# Patient Record
Sex: FEMALE | Race: WHITE | NOT HISPANIC OR LATINO | Employment: OTHER | URBAN - METROPOLITAN AREA
[De-identification: names, ages, dates, MRNs, and addresses within clinical notes are randomized per-mention and may not be internally consistent; named-entity substitution may affect disease eponyms.]

---

## 2017-06-09 ENCOUNTER — HOSPITAL ENCOUNTER (OUTPATIENT)
Dept: PHYSICAL THERAPY | Facility: REHABILITATION | Age: 72
End: 2017-06-09
Attending: FAMILY MEDICINE
Payer: MEDICARE

## 2017-06-09 PROCEDURE — G8979 MOBILITY GOAL STATUS: HCPCS | Mod: CI

## 2017-06-09 PROCEDURE — G8978 MOBILITY CURRENT STATUS: HCPCS | Mod: CJ

## 2017-06-09 PROCEDURE — 97163 PT EVAL HIGH COMPLEX 45 MIN: CPT

## 2017-06-09 PROCEDURE — 97112 NEUROMUSCULAR REEDUCATION: CPT

## 2017-06-16 ENCOUNTER — HOSPITAL ENCOUNTER (OUTPATIENT)
Dept: PHYSICAL THERAPY | Facility: REHABILITATION | Age: 72
End: 2017-06-16
Attending: FAMILY MEDICINE
Payer: MEDICARE

## 2017-06-16 PROCEDURE — 97014 ELECTRIC STIMULATION THERAPY: CPT

## 2017-06-16 PROCEDURE — 97140 MANUAL THERAPY 1/> REGIONS: CPT

## 2017-06-16 PROCEDURE — 97110 THERAPEUTIC EXERCISES: CPT

## 2020-06-08 ENCOUNTER — PHYSICAL THERAPY (OUTPATIENT)
Dept: PHYSICAL THERAPY | Facility: REHABILITATION | Age: 75
End: 2020-06-08
Attending: FAMILY MEDICINE
Payer: MEDICARE

## 2020-06-08 DIAGNOSIS — M54.41 CHRONIC BILATERAL LOW BACK PAIN WITH BILATERAL SCIATICA: ICD-10-CM

## 2020-06-08 DIAGNOSIS — G89.29 CHRONIC BILATERAL LOW BACK PAIN WITH BILATERAL SCIATICA: ICD-10-CM

## 2020-06-08 DIAGNOSIS — M54.42 CHRONIC BILATERAL LOW BACK PAIN WITH BILATERAL SCIATICA: ICD-10-CM

## 2020-06-08 PROCEDURE — 97014 ELECTRIC STIMULATION THERAPY: CPT

## 2020-06-08 PROCEDURE — 97162 PT EVAL MOD COMPLEX 30 MIN: CPT

## 2020-06-08 ASSESSMENT — ENCOUNTER SYMPTOMS
AWAKENINGS PER NIGHT: 3
QUALITY: ACHING
QUALITY: NUMBNESS
PAIN SCALE: 2
PAIN SCALE AT HIGHEST: 8

## 2020-06-08 NOTE — Clinical Note
June 8, 2020    Venancio Herman M.D.  [address]  [address]  [place] [address]    Patient: Giselle Ramon   YOB: 1945   Date of Visit: 6/8/2020       Dear Venancio Herman M.d.  [address]  [address]  [place] [id number] [phone]    The attached plan of care is being sent to you because your patient’s medical reimbursement requires that you certify the plan of care. Your signature is required to allow uninterrupted insurance coverage.      You may indicate your approval by signing below and faxing this form back to us at No information on file..    Please call Dept: [phone] with any questions or concerns.    Thank you for this referral,        Stephanie Rodriguez, PT, MPT, OCS  [facility] PHYSICAL [address] [address] [phone]    Payer: Payor: MEDICARE / Plan: MEDICARE PART A & B / Product Type: *No Product type* /                                                                  Specialty Plan of Care 06/08/20   Effective from: 6/8/2020  Effective to: 6/8/2020    Plan ID: [id number]           Participants     Name Type Comments Contact Info    Venancio Herman M.D. Provider  [phone]    Stephanie Rodriguez, PT, MPT, OCS PT        Evaluation/Progress Summary     Author: Stephanie Rodriguez PT, MPT, OCS Status: Sign when Signing Visit Last edited: 6/8/2020 12:30 PM         Outpatient Physical Therapy  INITIAL EVALUATION    [facility] Physical [address], [address]  [facility] [address]  Phone:  [phone]    Date of Evaluation: 06/08/2020    Patient: Giselle Ramon  YOB: 1945  MRN: [id number]     Referring Provider: Venancio Herman M.D.  [address]  [facility] [address]  [place] [address]   Referring Diagnosis No admission diagnoses are documented for this encounter.     Time Calculation    Start time: 1200  Stop time: 1315 Time Calculation (min): 75 minutes         Chief  "Complaint: Back Problem    Visit Diagnoses     ICD-10-CM   1. Chronic bilateral low back pain with bilateral sciatica  M54.42    M54.41    G89.29         Subjective:   History of Present Illness:     Mechanism of injury:  Pt is 73 yo female with chronic LBP who has been seen in this clinic in past for same issue.  Has been able to control sx's with previous HEP but  has been dx with cancer and for the last several months she has been his primary care giver and sx's have increased due to her inability to do HEP.  Pain is worsening.  Now has central LBP that can radiate into B hips, not necessarily at the same time.  States she has numbness or \"coldness in legs\".  Has not had any recent imaging and has not had any interventions.  Had an MRI several years ago which she states \"was bad\".  Also c/o L knee pain and popping that feels worse when her back is worse.    Prior level of function:  Fatigued due to being primary care giver  Sleep disturbance:  Interrupted sleep (needs abductor pillow or feet will fall asleep.)  # Times/Night awakened:  3  Pain:     Current pain ratin    At worst pain ratin    Quality:  Aching and numbness (back spasmsa that radiate anteriorly when she overdoes activity. )    Pain timing: am is better, worse at EOD and with activity.    Pain Comments::  Aggs:  Walking too far-  Has to use grocery cart in store but even then walking that far is difficult.  Carrying groceries or laundry.  Standing x 10'.  Turning in bed bothers L knee or B hips.  Increased activity makes LBP that radiates into hips.  Laying flat.      Ease:  Sitting, heat  Diagnostic Tests:     None    Treatments:     Previous treatment:  Acupuncture and physical therapy  Patient Goals:     Patient goals for therapy:  Decreased pain      Past Medical History:   Diagnosis Date   • Anesthesia     PONV   • Arthritis    • Heart burn    • Indigestion    • Pain 14    both wrists hurt with activity-numb   • " Unspecified cataract     Bilateral IOL's     Past Surgical History:   Procedure Laterality Date   • CARPAL TUNNEL RELEASE  7/8/2014    Performed by Herson Hedrick M.D. at SURGERY [facility]   • CARPAL TUNNEL RELEASE  6/16/2014    Performed by Herson Hedrick M.D. at SURGERY [facility]   • OTHER  2011    Bilateral IOL's     Social History     Tobacco Use   • Smoking status: Never Smoker   Substance Use Topics   • Alcohol use: No     Family and Occupational History     Socioeconomic History   • Marital status:      Spouse name: Not on file   • Number of children: Not on file   • Years of education: Not on file   • Highest education level: Not on file   Occupational History   • Not on file       Objective     Observations     Additional Observation Details  B IC level. R shoulder significantly lower than L.  Increased kyphotic curve at TL junction.  Increased lordosis at LS junction.      Neurological Testing     Reflexes   Left   Patellar (L4): trace (1+)  Achilles (S1): absent (0)    Right   Patellar (L4): trace (1+)  Achilles (S1): absent (0)    Myotome testing   Lumbar (left)   L2 (hip flexors): 4+  L3 (knee extensors): 5  L4 (ankle dorsiflexors): 5  L5 (great toe extension): 5  S1 (ankle plantar flexors): 2+    Lumbar (right)   L2 (hip flexors): 4-  L3 (knee extensors): 5  L4 (ankle dorsiflexors): 5  L5 (great toe extension): 5  S1 (ankle plantar flexors): 3-    Dermatome testing   Lumbar (left)   All left lumbar dermatomes intact    Lumbar (right)   All right lumbar dermatomes intact    Active Range of Motion     Lumbar   Flexion: within functional limits (porr lumbar curve reversal)  Extension: decreased (10 degrees)  Left lateral flexion: decreased (decreased 80%, pain in R hip)  Right lateral flexion: Right lateral lumbar spine flexion: 70%   Left rotation: decreased (50%)  Right rotation: decreased (50%)    Passive Range of Motion     Additional Passive Range of Motion Details  Moderately  decreased B hip IR.  Moderately decreased R ER    Joint Play   Spine     Central PA [place]        T10: hypomobile and painful       T11: hypomobile and painful       T12: hypomobile and painful       L1: hypomobile and painful       L2: hypomobile and painful       L3: hypomobile and painful       L4: hypomobile and painful       L5: hypermobile       S1: hypomobile    Unilateral PA Glide (left)        T10: hypomobile and painful       T11: hypomobile and painful       T12: hypomobile and painful       L1: hypomobile and painful       L2: hypomobile and painful       L3: hypomobile and painful       L4: hypomobile and painful       L5: hypermobile       S1: hypermobile    Unilateral PA Glide (right)        T10: hypomobile       T11: hypomobile       T12: hypomobile       L1: hypomobile       L2: hypomobile       L3: hypomobile       L4: hypomobile and painful       L5: hypermobile       S1: hypermobile        Strength:      Left Hip   Planes of Motion   Extension: 3  Abduction: 3+    Right Hip   Planes of Motion   Extension: 3  Abduction: 3-    Tests     Additional Tests Details  - [facility]        Therapeutic Treatments and Modalities:     1. Manual Therapy (CPT 19208), IDN for B L4-5 multifidus, B glut med, B glut minimus, B ILL.  Consent obtained, risks explained.  Skin prepped properly    2. E Stim Unattended (CPT 17806), IFC with MHP to L/S x 15'// no pain with L SB, increased L/S ext by 10 degrees.     Time-based treatments/modalities:           Assessment, Response and Plan:   Impairments: abnormal or restricted ROM, impaired physical strength, limited mobility and pain with function    Other Impairments:  Poor segmental lumbar/thoracis mobility, disrupted sleep,   Assessment details:  Pt presents with chronic LBP with referral to B LEs.  Pt presents with difficult to elicit B S1 reflexes and B S1 myotomal weakness. In addition, pt has a significant observable scoliosis with lateral shift to R and B hip weakness  and decreased flexibility that contribute to complaints of LBP and B LE numbness and weakness.  To benefit from skilled PT to help address above deficits to improve her ability to sleep and do daily routine which includes caring for her sick .  Due to significant observable scoliosis and S1 deficits, additional diagnostics may be needed to determine if additional medical intervention is indicated.    Barriers to therapy:  Time constraints  Prognosis: fair    Prognosis details:  Chronic nature and patient has limited time for self care due to needs of her  at this time.    Goals:   Short Term Goals:   1. Pt will be able to stand for 15-20' to cook a meal or do dishes.  2.  Pt reports 50% improvement in ability to sleep due to LBP disruption.  Short term goal time span:  2-4 weeks      Long Term Goals:    1. Pt will be able to go thru grocery store without need for cart for support x 20-30'.  2.  Pt will be able to stand to do meal prep x 30' without increasing B hip numbness or weakness.  3.  Pt will be able to report 80% improvement in sleep due to LBP disruption.  4. I in HEP for self maintenance.  Long term goal time span:  6-8 weeks    Plan:   Therapy options:  Physical therapy treatment to continue  Planned therapy interventions:  E Stim Unattended (CPT 17314), Manual Therapy (CPT 11842), Neuromuscular Re-education (CPT 85075), Therapeutic Activities (CPT 85783) and Therapeutic Exercise (CPT 27218)  Frequency:  2x week  Duration in weeks:  8  Discussed with:  Patient  Plan details:  Will see 1-2x/week depending on patient's availability for 4-8 weeks.        Functional Assessment Used  Jaime Ross Low Back Pain and Disability Score: 54.17     Referring provider co-signature:  I have reviewed this plan of care and my co-signature certifies the need for services.    Physician Signature: ________________________________ Date: ______________                      Updated Participants     Name Type  Comments Contact Info    Venancio Herman M.D. Provider  [phone]    Signature pending    Stephanie Rodriguez, PT, MPT, OCS PT

## 2020-06-08 NOTE — OP THERAPY EVALUATION
"  Outpatient Physical Therapy  INITIAL EVALUATION    [facility]  [address] [facility], [address]  [place] [address]  Phone:  [phone]    Date of Evaluation: 2020    Patient: Giselle Ramon  YOB: 1945  MRN: [id number]     Referring Provider: Venancio Herman M.D.  [address] [facility]  [address]  [place] [address]   Referring Diagnosis No admission diagnoses are documented for this encounter.     Time Calculation    Start time: 1200  Stop time: 1315 Time Calculation (min): 75 minutes         Chief Complaint: Back Problem    Visit Diagnoses     ICD-10-CM   1. Chronic bilateral low back pain with bilateral sciatica  M54.42    M54.41    G89.29         Subjective:   History of Present Illness:     Mechanism of injury:  Pt is 73 yo female with chronic LBP who has been seen in this clinic in past for same issue.  Has been able to control sx's with previous HEP but  has been dx with cancer and for the last several months she has been his primary care giver and sx's have increased due to her inability to do HEP.  Pain is worsening.  Now has central LBP that can radiate into B hips, not necessarily at the same time.  States she has numbness or \"coldness in legs\".  Has not had any recent imaging and has not had any interventions.  Had an MRI several years ago which she states \"was bad\".  Also c/o L knee pain and popping that feels worse when her back is worse.    Prior level of function:  Fatigued due to being primary care giver  Sleep disturbance:  Interrupted sleep (needs abductor pillow or feet will fall asleep.)  # Times/Night awakened:  3  Pain:     Current pain ratin    At worst pain ratin    Quality:  Aching and numbness (back spasmsa that radiate anteriorly when she overdoes activity. )    Pain timing: am is better, worse at EOD and with activity.    Pain Comments::  Aggs:  Walking too far-  Has to use grocery cart in store but even then walking that far " is difficult.  Carrying groceries or laundry.  Standing x 10'.  Turning in bed bothers L knee or B hips.  Increased activity makes LBP that radiates into hips.  Laying flat.      Ease:  Sitting, heat  Diagnostic Tests:     None    Treatments:     Previous treatment:  Acupuncture and physical therapy  Patient Goals:     Patient goals for therapy:  Decreased pain      Past Medical History:   Diagnosis Date   • Anesthesia     PONV   • Arthritis    • Heart burn    • Indigestion    • Pain 06/06/14    both wrists hurt with activity-numb   • Unspecified cataract     Bilateral IOL's     Past Surgical History:   Procedure Laterality Date   • CARPAL TUNNEL RELEASE  7/8/2014    Performed by Herson Hedrick M.D. at SURGERY [facility]   • CARPAL TUNNEL RELEASE  6/16/2014    Performed by Herson Hedrick M.D. at SURGERY [facility]   • OTHER  2011    Bilateral IOL's     Social History     Tobacco Use   • Smoking status: Never Smoker   Substance Use Topics   • Alcohol use: No     Family and Occupational History     Socioeconomic History   • Marital status:      Spouse name: Not on file   • Number of children: Not on file   • Years of education: Not on file   • Highest education level: Not on file   Occupational History   • Not on file       Objective     Observations     Additional Observation Details  B IC level. R shoulder significantly lower than L.  Increased kyphotic curve at TL junction.  Increased lordosis at LS junction.      Neurological Testing     Reflexes   Left   Patellar (L4): trace (1+)  Achilles (S1): absent (0)    Right   Patellar (L4): trace (1+)  Achilles (S1): absent (0)    Myotome testing   Lumbar (left)   L2 (hip flexors): 4+  L3 (knee extensors): 5  L4 (ankle dorsiflexors): 5  L5 (great toe extension): 5  S1 (ankle plantar flexors): 2+    Lumbar (right)   L2 (hip flexors): 4-  L3 (knee extensors): 5  L4 (ankle dorsiflexors): 5  L5 (great toe extension): 5  S1 (ankle plantar flexors):  3-    Dermatome testing   Lumbar (left)   All left lumbar dermatomes intact    Lumbar (right)   All right lumbar dermatomes intact    Active Range of Motion     Lumbar   Flexion: within functional limits (porr lumbar curve reversal)  Extension: decreased (10 degrees)  Left lateral flexion: decreased (decreased 80%, pain in R hip)  Right lateral flexion: Right lateral lumbar spine flexion: 70%   Left rotation: decreased (50%)  Right rotation: decreased (50%)    Passive Range of Motion     Additional Passive Range of Motion Details  Moderately decreased B hip IR.  Moderately decreased R ER    Joint Play   Spine     Central PA [place]        T10: hypomobile and painful       T11: hypomobile and painful       T12: hypomobile and painful       L1: hypomobile and painful       L2: hypomobile and painful       L3: hypomobile and painful       L4: hypomobile and painful       L5: hypermobile       S1: hypomobile    Unilateral PA Glide (left)        T10: hypomobile and painful       T11: hypomobile and painful       T12: hypomobile and painful       L1: hypomobile and painful       L2: hypomobile and painful       L3: hypomobile and painful       L4: hypomobile and painful       L5: hypermobile       S1: hypermobile    Unilateral PA Glide (right)        T10: hypomobile       T11: hypomobile       T12: hypomobile       L1: hypomobile       L2: hypomobile       L3: hypomobile       L4: hypomobile and painful       L5: hypermobile       S1: hypermobile        Strength:      Left Hip   Planes of Motion   Extension: 3  Abduction: 3+    Right Hip   Planes of Motion   Extension: 3  Abduction: 3-    Tests     Additional Tests Details  - [facility]        Therapeutic Treatments and Modalities:     1. Manual Therapy (CPT 44258), IDN for B L4-5 multifidus, B glut med, B glut minimus, B ILL.  Consent obtained, risks explained.  Skin prepped properly    2. E Stim Unattended (CPT 39893), IFC with MHP to L/S x 15'// no pain with L SB, increased  L/S ext by 10 degrees.     Time-based treatments/modalities:           Assessment, Response and Plan:   Impairments: abnormal or restricted ROM, impaired physical strength, limited mobility and pain with function    Other Impairments:  Poor segmental lumbar/thoracis mobility, disrupted sleep,   Assessment details:  Pt presents with chronic LBP with referral to B LEs.  Pt presents with difficult to elicit B S1 reflexes and B S1 myotomal weakness. In addition, pt has a significant observable scoliosis with lateral shift to R and B hip weakness and decreased flexibility that contribute to complaints of LBP and B LE numbness and weakness.  To benefit from skilled PT to help address above deficits to improve her ability to sleep and do daily routine which includes caring for her sick .  Due to significant observable scoliosis and S1 deficits, additional diagnostics may be needed to determine if additional medical intervention is indicated.    Barriers to therapy:  Time constraints  Prognosis: fair    Prognosis details:  Chronic nature and patient has limited time for self care due to needs of her  at this time.    Goals:   Short Term Goals:   1. Pt will be able to stand for 15-20' to cook a meal or do dishes.  2.  Pt reports 50% improvement in ability to sleep due to LBP disruption.  Short term goal time span:  2-4 weeks      Long Term Goals:    1. Pt will be able to go thru grocery store without need for cart for support x 20-30'.  2.  Pt will be able to stand to do meal prep x 30' without increasing B hip numbness or weakness.  3.  Pt will be able to report 80% improvement in sleep due to LBP disruption.  4. I in HEP for self maintenance.  Long term goal time span:  6-8 weeks    Plan:   Therapy options:  Physical therapy treatment to continue  Planned therapy interventions:  E Stim Unattended (CPT 86249), Manual Therapy (CPT 32809), Neuromuscular Re-education (CPT 26325), Therapeutic Activities (CPT  10366) and Therapeutic Exercise (CPT 97184)  Frequency:  2x week  Duration in weeks:  8  Discussed with:  Patient  Plan details:  Will see 1-2x/week depending on patient's availability for 4-8 weeks.        Functional Assessment Used  Jaime Ross Low Back Pain and Disability Score: 54.17     Referring provider co-signature:  I have reviewed this plan of care and my co-signature certifies the need for services.    Physician Signature: ________________________________ Date: ______________

## 2020-06-10 ENCOUNTER — PHYSICAL THERAPY (OUTPATIENT)
Dept: PHYSICAL THERAPY | Facility: REHABILITATION | Age: 75
End: 2020-06-10
Attending: FAMILY MEDICINE
Payer: MEDICARE

## 2020-06-10 DIAGNOSIS — M54.42 CHRONIC BILATERAL LOW BACK PAIN WITH BILATERAL SCIATICA: ICD-10-CM

## 2020-06-10 DIAGNOSIS — G89.29 CHRONIC BILATERAL LOW BACK PAIN WITH BILATERAL SCIATICA: ICD-10-CM

## 2020-06-10 DIAGNOSIS — M54.41 CHRONIC BILATERAL LOW BACK PAIN WITH BILATERAL SCIATICA: ICD-10-CM

## 2020-06-10 PROCEDURE — 97140 MANUAL THERAPY 1/> REGIONS: CPT

## 2020-06-10 PROCEDURE — 97112 NEUROMUSCULAR REEDUCATION: CPT

## 2020-06-10 PROCEDURE — 97014 ELECTRIC STIMULATION THERAPY: CPT

## 2020-06-10 NOTE — OP THERAPY DAILY TREATMENT
Outpatient Physical Therapy  DAILY TREATMENT     [facility] [place] [address] [facility], [address]  [place] [address]  Phone:  [phone]    Date: 06/10/2020    Patient: Giselle Ramon  YOB: 1945  MRN: [id number]     Time Calculation    Start time: 1155  Stop time: 1250 Time Calculation (min): 55 minutes         Chief Complaint: Back Problem    Visit #: 2    SUBJECTIVE:  Got significant relief that has lasted since last visit.  Did have on bout of increased pain when  couldn't get out of the chair.  Resolved within hours.  Noticed less L knee pain.      OBJECTIVE:            Therapeutic Treatments and Modalities:     1. Manual Therapy (CPT 20069), IDN for B L4-5 multifidus, B glut med, B glut minimus, B ILL.  Consent obtained, risks explained.  Skin prepped properly    2. E Stim Unattended (CPT 41884), IFC with MHP to L/S x 15'// no pain with L SB, increased L/S ext by 10 degrees.     3. Neuromuscular Re-education (CPT 23811), Snake in the grass with green TB feedback for trunk flex x 20, B SL hip abd x 10.  Added to HEP with handout and TB.  , Ball wall squats- unable to do more than 3 due to Knee pain. , Educated in  principles to help  out of chair including wide MONSE, neutral spine, hip and knee flex.      Time-based treatments/modalities:    Physical Therapy Timed Treatment Charges  Manual therapy minutes (CPT 27392): 15 minutes  Neuromusc re-ed, balance, coor, post minutes (CPT 61691): 15 minutes      Pain rating (1-10) before treatment:  0  Pain rating (1-10) after treatment:  2    ASSESSMENT:   Response to treatment: Poor pelvic/lumbar dissociation and weak B hip abductors with poor activity tolerance.      PLAN/RECOMMENDATIONS:   Plan for treatment: therapy treatment to continue next visit.  Planned interventions for next visit: continue with current treatment. Progress core strength and lumbar segmental mobility.

## 2020-06-15 ENCOUNTER — PHYSICAL THERAPY (OUTPATIENT)
Dept: PHYSICAL THERAPY | Facility: REHABILITATION | Age: 75
End: 2020-06-15
Attending: FAMILY MEDICINE
Payer: MEDICARE

## 2020-06-15 DIAGNOSIS — G89.29 CHRONIC BILATERAL LOW BACK PAIN WITH BILATERAL SCIATICA: ICD-10-CM

## 2020-06-15 DIAGNOSIS — M54.41 CHRONIC BILATERAL LOW BACK PAIN WITH BILATERAL SCIATICA: ICD-10-CM

## 2020-06-15 DIAGNOSIS — M54.42 CHRONIC BILATERAL LOW BACK PAIN WITH BILATERAL SCIATICA: ICD-10-CM

## 2020-06-15 PROCEDURE — 97140 MANUAL THERAPY 1/> REGIONS: CPT

## 2020-06-15 PROCEDURE — 97014 ELECTRIC STIMULATION THERAPY: CPT

## 2020-06-15 NOTE — OP THERAPY DAILY TREATMENT
Outpatient Physical Therapy  DAILY TREATMENT     [facility] Out[facility] Physical Therapy [place] [id number] [facility], [address]  [place] [address]  Phone:  [phone]    Date: 06/15/2020    Patient: Giselle Ramon  YOB: 1945  MRN: [id number]     Time Calculation    Start time: 1200  Stop time: 1250 Time Calculation (min): 50 minutes         Chief Complaint: Back Problem    Visit #: 3    SUBJECTIVE:  Feeling worse today because she drove home to [place] and that tends to flare things up.  Feels relief for at least 2 days.  Grocery shopping is very difficult due to increased pain.  Very tired today from care giving.     OBJECTIVE:  Current objective measures: Increased forward lean in standing today.            Therapeutic Treatments and Modalities:     1. Manual Therapy (CPT 63507), IDN for B L4-5 multifidus, B glut med, B glut minimus, B ILL.  Consent obtained, risks explained.  Skin prepped properly, Central and B unilateral PAs gr 3-4 T10-L5, STM to B QL, gluts, paraspinals    3. Neuromuscular Re-education (CPT 82224), TNE for self pacing with daily routine to avoid exacerbation.  Strategies include shopping on line, using family to A with grocery , sleep hygiene    4. E Stim Unattended (CPT 46406), IFC to Lumbar spine with MHP x 15'    Time-based treatments/modalities:    Physical Therapy Timed Treatment Charges  Manual therapy minutes (CPT 11751): 25 minutes  Neuromusc re-ed, balance, coor, post minutes (CPT 08929): 5 minutes      Pain rating (1-10) before treatment:  6  Pain rating (1-10) after treatment:  3    ASSESSMENT:   Response to treatment: Pt has poor self pacing strategies.  Decreased standing posture as a result of increased pain and fatigue today.  Poor TL segmental mobility persists.      PLAN/RECOMMENDATIONS:   Plan for treatment: therapy treatment to continue next visit.  Planned interventions for next visit: continue with current treatment.

## 2020-06-17 ENCOUNTER — PHYSICAL THERAPY (OUTPATIENT)
Dept: PHYSICAL THERAPY | Facility: REHABILITATION | Age: 75
End: 2020-06-17
Attending: FAMILY MEDICINE
Payer: MEDICARE

## 2020-06-17 DIAGNOSIS — G89.29 CHRONIC BILATERAL LOW BACK PAIN WITH BILATERAL SCIATICA: ICD-10-CM

## 2020-06-17 DIAGNOSIS — M54.42 CHRONIC BILATERAL LOW BACK PAIN WITH BILATERAL SCIATICA: ICD-10-CM

## 2020-06-17 DIAGNOSIS — M54.41 CHRONIC BILATERAL LOW BACK PAIN WITH BILATERAL SCIATICA: ICD-10-CM

## 2020-06-17 PROCEDURE — 97140 MANUAL THERAPY 1/> REGIONS: CPT

## 2020-06-17 PROCEDURE — 97112 NEUROMUSCULAR REEDUCATION: CPT

## 2020-06-17 PROCEDURE — 97014 ELECTRIC STIMULATION THERAPY: CPT

## 2020-06-17 NOTE — OP THERAPY DAILY TREATMENT
Outpatient Physical Therapy  DAILY TREATMENT     [facility] [place] [address] [facility], [address]  [place] [address]  Phone:  [phone]    Date: 06/17/2020    Patient: Giselle Ramon  YOB: 1945  MRN: [id number]     Time Calculation    Start time: 1200  Stop time: 1300 Time Calculation (min): 60 minutes         Chief Complaint: Back Problem    Visit #: 4    SUBJECTIVE:  Felt good until last night.  Now R hip is really sore.  Difficult to stand to shower today.      OBJECTIVE:  Current objective measures: Severely limited lumbar ext AROM- 0 degrees,  increased forward flexion in standing.           Therapeutic Treatments and Modalities:     1. Manual Therapy (CPT 32723), IDN for B L4-5 multifidus, B glut med, B glut minimus, R inferior glut HA.  Consent obtained, risks explained.  Skin prepped properly, Central and B unilateral PAs gr 3-4 T10-L5, STM to B QL, gluts, paraspinals    3. Neuromuscular Re-education (CPT 12783), TNE for self pacing with daily routine to avoid exacerbation.  Strategies discussed for reducing stressors that cause CNS sensitivity and ways to make exercise routine successful.     4. E Stim Unattended (CPT 89909), IFC to Lumbar spine with MHP x 15'    Time-based treatments/modalities:    Physical Therapy Timed Treatment Charges  Manual therapy minutes (CPT 86321): 25 minutes  Neuromusc re-ed, balance, coor, post minutes (CPT 14355): 10 minutes      Pain rating (1-10) before treatment:  7  Pain rating (1-10) after treatment:  3    ASSESSMENT:   Response to treatment: Can get several day relief from LBP between visits but then returns.  Due to increased curved nature of spine, rec f/u with MD to determine if additional medical intervention is warranted nina since patient has abnormal sensation, LE weakness and a feeling like her legs could give out B.  Will continue with PT for 2 more weeks to see if lasting gains can be achieved and allow for exercise  progression.     PLAN/RECOMMENDATIONS:   Plan for treatment: therapy treatment to continue next visit.  Planned interventions for next visit: continue with current treatment.

## 2020-06-24 ENCOUNTER — APPOINTMENT (OUTPATIENT)
Dept: PHYSICAL THERAPY | Facility: REHABILITATION | Age: 75
End: 2020-06-24
Attending: FAMILY MEDICINE
Payer: MEDICARE

## 2020-06-29 ENCOUNTER — PHYSICAL THERAPY (OUTPATIENT)
Dept: PHYSICAL THERAPY | Facility: REHABILITATION | Age: 75
End: 2020-06-29
Attending: FAMILY MEDICINE
Payer: MEDICARE

## 2020-06-29 DIAGNOSIS — G89.29 CHRONIC BILATERAL LOW BACK PAIN WITH BILATERAL SCIATICA: ICD-10-CM

## 2020-06-29 DIAGNOSIS — M54.42 CHRONIC BILATERAL LOW BACK PAIN WITH BILATERAL SCIATICA: ICD-10-CM

## 2020-06-29 DIAGNOSIS — M54.41 CHRONIC BILATERAL LOW BACK PAIN WITH BILATERAL SCIATICA: ICD-10-CM

## 2020-06-29 PROCEDURE — 97140 MANUAL THERAPY 1/> REGIONS: CPT

## 2020-06-29 PROCEDURE — 97014 ELECTRIC STIMULATION THERAPY: CPT

## 2020-06-29 NOTE — OP THERAPY DAILY TREATMENT
Outpatient Physical Therapy  DAILY TREATMENT     [facility] Outpatient Physical Therapy [place] [id number] [facility], [address]  [place] [address]  Phone:  [phone]    Date: 06/29/2020    Patient: Giselle Ramon  YOB: 1945  MRN: [id number]     Time Calculation    Start time: 1215  Stop time: 1305 Time Calculation (min): 50 minutes         Chief Complaint: Back Problem and Hip Problem    Visit #: 5    SUBJECTIVE:  Did not do well without therapy last week. Life has been very hectic.   is getting worse.  Feels like L hip is shifted and is causing all sorts of pain in back.  Also has B feet stiffness after prolonged sitting.      OBJECTIVE:  Current objective measures: + R lateral shift, excessive forward lean.  Can't achieve > -10 from neutral for lumbar extension today.            Therapeutic Treatments and Modalities:     1. Manual Therapy (CPT 63639), IDN for B L4-5 multifidus, B glut med, B glut minimus, R inferior glut HA.  Consent obtained, risks explained.  Skin prepped properly, Central and B unilateral PAs gr 3-4 T10-L5, STM to B QL, gluts, paraspinals    3. Neuromuscular Re-education (CPT 07209), Educated to use towel and given info to order lumbar Cassie roll and to use with sitting.  Educated to use lumbar lordosis with sitting since correction of posture abolished B ankle stiffiness c/o in sitting.      4. E Stim Unattended (CPT 86227), IFC to Lumbar spine with MHP x 15'    Time-based treatments/modalities:    Physical Therapy Timed Treatment Charges  Manual therapy minutes (CPT 14408): 30 minutes      Pain rating (1-10) before treatment:  7  Pain rating (1-10) after treatment:  4    ASSESSMENT:   Response to treatment: Stiffness in B feet caused by lumbar flex.  Can decrease with posture correction.     PLAN/RECOMMENDATIONS:   Plan for treatment: therapy treatment to continue next visit.  Planned interventions for next visit: continue with current treatment.  Progress core stability.

## 2020-07-01 ENCOUNTER — PHYSICAL THERAPY (OUTPATIENT)
Dept: PHYSICAL THERAPY | Facility: REHABILITATION | Age: 75
End: 2020-07-01
Attending: FAMILY MEDICINE
Payer: MEDICARE

## 2020-07-01 DIAGNOSIS — M54.42 CHRONIC BILATERAL LOW BACK PAIN WITH BILATERAL SCIATICA: ICD-10-CM

## 2020-07-01 DIAGNOSIS — G89.29 CHRONIC BILATERAL LOW BACK PAIN WITH BILATERAL SCIATICA: ICD-10-CM

## 2020-07-01 DIAGNOSIS — M54.41 CHRONIC BILATERAL LOW BACK PAIN WITH BILATERAL SCIATICA: ICD-10-CM

## 2020-07-01 PROCEDURE — 97014 ELECTRIC STIMULATION THERAPY: CPT

## 2020-07-01 PROCEDURE — 97112 NEUROMUSCULAR REEDUCATION: CPT

## 2020-07-01 PROCEDURE — 97140 MANUAL THERAPY 1/> REGIONS: CPT

## 2020-07-01 NOTE — OP THERAPY DAILY TREATMENT
Outpatient Physical Therapy  DAILY TREATMENT     [facility] Outpatient Physical Therapy [place] [address] [facility], [address]  [place] [address]  Phone:  [phone]    Date: 07/01/2020    Patient: Giselle Ramon  YOB: 1945  MRN: [id number]     Time Calculation    Start time: 1210  Stop time: 1305 Time Calculation (min): 55 minutes         Chief Complaint: Back Problem    Visit #: 6    SUBJECTIVE:  Feels like PT relief is only temporary.   is getting worse and requiring more care.  This is preventing her from being able to do HEP.  Still hasn't been able to schedule appt with Dr. Hedrick.  R hip pain and L buttocks sciatica present today.      OBJECTIVE:  *          Therapeutic Treatments and Modalities:     1. Manual Therapy (CPT 21127), IDN for B L4-5 multifidus, B glut med, B glut minimus, R inferior glut HA.  Consent obtained, risks explained.  Skin prepped properly, Central and B unilateral PAs gr 3-4 T10-L5, R SL for Lumbar rot gr 3-4 and facet gapping, STM to B QL, gluts, paraspinals    3. Neuromuscular Re-education (CPT 76322), reveiwed HEP for [place] RFIS, lateral glides on wall for centralization and pain management, snake in the grass and foam roller for segmental mobility, and bridges and B SL hip abd for strengthening.      4. E Stim Unattended (CPT 21625), IFC to Lumbar spine with MHP x 15'    Time-based treatments/modalities:    Physical Therapy Timed Treatment Charges  Manual therapy minutes (CPT 78559): 20 minutes  Neuromusc re-ed, balance, coor, post minutes (CPT 31654): 10 minutes        ASSESSMENT:   Response to treatment: No gains towards STGs and still needs a cart to do shopping.  Recommend putting PT on hold until f/u with Dr. Hedrick occurs.  Pt may benefit from additional diagnostics and medical intervention (ie epidural).  If patient does not return in 30 days will d/c PT due to progress plateau.      PLAN/RECOMMENDATIONS:   Plan for treatment: refer patient to the  specialty of neurosurgery.  Planned interventions for next visit: Pt on hold.  See above.  .

## 2021-05-26 ENCOUNTER — HOSPITAL ENCOUNTER (OUTPATIENT)
Dept: RADIOLOGY | Facility: MEDICAL CENTER | Age: 76
End: 2021-05-26
Attending: FAMILY MEDICINE
Payer: MEDICARE

## 2021-05-26 ENCOUNTER — HOSPITAL ENCOUNTER (OUTPATIENT)
Dept: RADIOLOGY | Facility: MEDICAL CENTER | Age: 76
End: 2021-05-26

## 2021-05-26 DIAGNOSIS — M81.0 ENCOUNTER FOR ONGOING OSTEOPOROSIS THERAPY, BISPHOSPHONATES: ICD-10-CM

## 2021-05-26 DIAGNOSIS — Z79.83 ENCOUNTER FOR ONGOING OSTEOPOROSIS THERAPY, BISPHOSPHONATES: ICD-10-CM

## 2021-05-26 DIAGNOSIS — Z12.31 ENCOUNTER FOR SCREENING MAMMOGRAM FOR MALIGNANT NEOPLASM OF BREAST: ICD-10-CM

## 2021-05-26 PROCEDURE — 77063 BREAST TOMOSYNTHESIS BI: CPT

## 2021-05-26 PROCEDURE — 77080 DXA BONE DENSITY AXIAL: CPT

## 2021-06-04 ENCOUNTER — HOSPITAL ENCOUNTER (OUTPATIENT)
Dept: RADIOLOGY | Facility: MEDICAL CENTER | Age: 76
End: 2021-06-04
Attending: FAMILY MEDICINE
Payer: MEDICARE

## 2021-06-04 DIAGNOSIS — R92.8 ABNORMAL MAMMOGRAM: ICD-10-CM

## 2021-06-04 PROCEDURE — G0279 TOMOSYNTHESIS, MAMMO: HCPCS

## 2021-06-04 PROCEDURE — 76642 ULTRASOUND BREAST LIMITED: CPT | Mod: LT

## 2021-06-07 ENCOUNTER — HOSPITAL ENCOUNTER (OUTPATIENT)
Dept: LAB | Facility: MEDICAL CENTER | Age: 76
End: 2021-06-07
Attending: FAMILY MEDICINE
Payer: MEDICARE

## 2021-06-07 LAB
ALBUMIN SERPL BCP-MCNC: 4.3 G/DL (ref 3.2–4.9)
ALBUMIN/GLOB SERPL: 1.7 G/DL
ALP SERPL-CCNC: 59 U/L (ref 30–99)
ALT SERPL-CCNC: 20 U/L (ref 2–50)
ANION GAP SERPL CALC-SCNC: 11 MMOL/L (ref 7–16)
APPEARANCE UR: CLEAR
AST SERPL-CCNC: 25 U/L (ref 12–45)
BASOPHILS # BLD AUTO: 0.4 % (ref 0–1.8)
BASOPHILS # BLD: 0.03 K/UL (ref 0–0.12)
BILIRUB SERPL-MCNC: 0.8 MG/DL (ref 0.1–1.5)
BILIRUB UR QL STRIP.AUTO: NEGATIVE
BUN SERPL-MCNC: 17 MG/DL (ref 8–22)
CALCIUM SERPL-MCNC: 9.8 MG/DL (ref 8.5–10.5)
CHLORIDE SERPL-SCNC: 104 MMOL/L (ref 96–112)
CHOLEST SERPL-MCNC: 186 MG/DL (ref 100–199)
CO2 SERPL-SCNC: 23 MMOL/L (ref 20–33)
COLOR UR: ABNORMAL
CREAT SERPL-MCNC: 0.84 MG/DL (ref 0.5–1.4)
EOSINOPHIL # BLD AUTO: 0.08 K/UL (ref 0–0.51)
EOSINOPHIL NFR BLD: 1.1 % (ref 0–6.9)
ERYTHROCYTE [DISTWIDTH] IN BLOOD BY AUTOMATED COUNT: 43.7 FL (ref 35.9–50)
GLOBULIN SER CALC-MCNC: 2.6 G/DL (ref 1.9–3.5)
GLUCOSE SERPL-MCNC: 101 MG/DL (ref 65–99)
GLUCOSE UR STRIP.AUTO-MCNC: NEGATIVE MG/DL
HCT VFR BLD AUTO: 44.8 % (ref 37–47)
HDLC SERPL-MCNC: 58 MG/DL
HGB BLD-MCNC: 14.9 G/DL (ref 12–16)
IMM GRANULOCYTES # BLD AUTO: 0.02 K/UL (ref 0–0.11)
IMM GRANULOCYTES NFR BLD AUTO: 0.3 % (ref 0–0.9)
KETONES UR STRIP.AUTO-MCNC: ABNORMAL MG/DL
LDLC SERPL CALC-MCNC: 115 MG/DL
LEUKOCYTE ESTERASE UR QL STRIP.AUTO: NEGATIVE
LYMPHOCYTES # BLD AUTO: 2.04 K/UL (ref 1–4.8)
LYMPHOCYTES NFR BLD: 29 % (ref 22–41)
MCH RBC QN AUTO: 30.7 PG (ref 27–33)
MCHC RBC AUTO-ENTMCNC: 33.3 G/DL (ref 33.6–35)
MCV RBC AUTO: 92.4 FL (ref 81.4–97.8)
MICRO URNS: ABNORMAL
MONOCYTES # BLD AUTO: 0.56 K/UL (ref 0–0.85)
MONOCYTES NFR BLD AUTO: 8 % (ref 0–13.4)
NEUTROPHILS # BLD AUTO: 4.3 K/UL (ref 2–7.15)
NEUTROPHILS NFR BLD: 61.2 % (ref 44–72)
NITRITE UR QL STRIP.AUTO: NEGATIVE
NRBC # BLD AUTO: 0 K/UL
NRBC BLD-RTO: 0 /100 WBC
PH UR STRIP.AUTO: 6 [PH] (ref 5–8)
PLATELET # BLD AUTO: 207 K/UL (ref 164–446)
PMV BLD AUTO: 12.5 FL (ref 9–12.9)
POTASSIUM SERPL-SCNC: 4.4 MMOL/L (ref 3.6–5.5)
PROT SERPL-MCNC: 6.9 G/DL (ref 6–8.2)
PROT UR QL STRIP: NEGATIVE MG/DL
RBC # BLD AUTO: 4.85 M/UL (ref 4.2–5.4)
RBC UR QL AUTO: NEGATIVE
SODIUM SERPL-SCNC: 138 MMOL/L (ref 135–145)
SP GR UR STRIP.AUTO: 1.02
TRIGL SERPL-MCNC: 66 MG/DL (ref 0–149)
TSH SERPL DL<=0.005 MIU/L-ACNC: 1.79 UIU/ML (ref 0.38–5.33)
UROBILINOGEN UR STRIP.AUTO-MCNC: 0.2 MG/DL
WBC # BLD AUTO: 7 K/UL (ref 4.8–10.8)

## 2021-06-07 PROCEDURE — 85025 COMPLETE CBC W/AUTO DIFF WBC: CPT

## 2021-06-07 PROCEDURE — 84443 ASSAY THYROID STIM HORMONE: CPT

## 2021-06-07 PROCEDURE — 80053 COMPREHEN METABOLIC PANEL: CPT

## 2021-06-07 PROCEDURE — 80061 LIPID PANEL: CPT

## 2021-06-07 PROCEDURE — 81003 URINALYSIS AUTO W/O SCOPE: CPT

## 2021-06-07 PROCEDURE — 36415 COLL VENOUS BLD VENIPUNCTURE: CPT

## 2021-06-10 ENCOUNTER — HOSPITAL ENCOUNTER (OUTPATIENT)
Dept: RADIOLOGY | Facility: MEDICAL CENTER | Age: 76
End: 2021-06-10
Attending: FAMILY MEDICINE
Payer: MEDICARE

## 2021-06-10 DIAGNOSIS — R92.8 ABNORMAL MAMMOGRAM: ICD-10-CM

## 2021-06-10 PROCEDURE — 76942 ECHO GUIDE FOR BIOPSY: CPT | Mod: LT

## 2021-11-17 ENCOUNTER — TELEPHONE (OUTPATIENT)
Dept: SCHEDULING | Facility: IMAGING CENTER | Age: 76
End: 2021-11-17

## 2021-11-22 ENCOUNTER — TELEPHONE (OUTPATIENT)
Dept: HEALTH INFORMATION MANAGEMENT | Facility: OTHER | Age: 76
End: 2021-11-22

## 2021-12-01 ENCOUNTER — OFFICE VISIT (OUTPATIENT)
Dept: MEDICAL GROUP | Facility: IMAGING CENTER | Age: 76
End: 2021-12-01
Payer: MEDICARE

## 2021-12-01 VITALS
DIASTOLIC BLOOD PRESSURE: 78 MMHG | TEMPERATURE: 98.5 F | RESPIRATION RATE: 12 BRPM | SYSTOLIC BLOOD PRESSURE: 164 MMHG | HEART RATE: 54 BPM | BODY MASS INDEX: 32.73 KG/M2 | HEIGHT: 69 IN | WEIGHT: 221 LBS | OXYGEN SATURATION: 95 %

## 2021-12-01 DIAGNOSIS — J34.9 SINUS PROBLEM: ICD-10-CM

## 2021-12-01 DIAGNOSIS — R73.01 ELEVATED FASTING GLUCOSE: ICD-10-CM

## 2021-12-01 DIAGNOSIS — Z12.11 SCREEN FOR COLON CANCER: ICD-10-CM

## 2021-12-01 DIAGNOSIS — M85.89 OSTEOPENIA OF MULTIPLE SITES: ICD-10-CM

## 2021-12-01 DIAGNOSIS — M25.50 ARTHRALGIA, UNSPECIFIED JOINT: ICD-10-CM

## 2021-12-01 DIAGNOSIS — I49.9 IRREGULAR HEART BEAT: ICD-10-CM

## 2021-12-01 DIAGNOSIS — Z86.79 HISTORY OF VARICOSE VEINS OF LOWER EXTREMITY: ICD-10-CM

## 2021-12-01 DIAGNOSIS — J30.89 ENVIRONMENTAL AND SEASONAL ALLERGIES: ICD-10-CM

## 2021-12-01 DIAGNOSIS — I49.8 BIGEMINY: ICD-10-CM

## 2021-12-01 DIAGNOSIS — R94.31 ABNORMAL EKG: ICD-10-CM

## 2021-12-01 DIAGNOSIS — M25.9 KNEE PROBLEM: ICD-10-CM

## 2021-12-01 DIAGNOSIS — R12 HEART BURN: ICD-10-CM

## 2021-12-01 DIAGNOSIS — R03.0 ELEVATED BP WITHOUT DIAGNOSIS OF HYPERTENSION: ICD-10-CM

## 2021-12-01 DIAGNOSIS — Z23 NEED FOR INFLUENZA VACCINATION: ICD-10-CM

## 2021-12-01 DIAGNOSIS — Z80.0 FAMILY HISTORY OF COLON CANCER IN FATHER: ICD-10-CM

## 2021-12-01 DIAGNOSIS — Z11.59 NEED FOR HEPATITIS C SCREENING TEST: ICD-10-CM

## 2021-12-01 PROCEDURE — 93000 ELECTROCARDIOGRAM COMPLETE: CPT | Performed by: FAMILY MEDICINE

## 2021-12-01 PROCEDURE — 99204 OFFICE O/P NEW MOD 45 MIN: CPT | Mod: 25 | Performed by: FAMILY MEDICINE

## 2021-12-01 PROCEDURE — 90662 IIV NO PRSV INCREASED AG IM: CPT | Performed by: FAMILY MEDICINE

## 2021-12-01 PROCEDURE — G0008 ADMIN INFLUENZA VIRUS VAC: HCPCS | Performed by: FAMILY MEDICINE

## 2021-12-01 RX ORDER — FLUTICASONE PROPIONATE 50 MCG
SPRAY, SUSPENSION (ML) NASAL
COMMUNITY

## 2021-12-01 RX ORDER — MONTELUKAST SODIUM 10 MG/1
TABLET ORAL
COMMUNITY
Start: 2021-10-10

## 2021-12-01 RX ORDER — MELOXICAM 15 MG/1
15 TABLET ORAL DAILY
COMMUNITY
Start: 2021-10-11 | End: 2022-12-22 | Stop reason: SDUPTHER

## 2021-12-01 ASSESSMENT — PATIENT HEALTH QUESTIONNAIRE - PHQ9: CLINICAL INTERPRETATION OF PHQ2 SCORE: 0

## 2021-12-01 ASSESSMENT — PAIN SCALES - GENERAL: PAINLEVEL: NO PAIN

## 2021-12-01 ASSESSMENT — FIBROSIS 4 INDEX: FIB4 SCORE: 2.03

## 2021-12-01 NOTE — PROGRESS NOTES
Chief Complaint   Patient presents with   • Seasonal Allergies   • Arthritis   • Establish Care     HPI:  76 y.o. female new patient here to review medical issues and establish care.   Has seen Venancio Herman MD.     Allergies- on singulair, flonase, abd Zyrtec currently.  Allergy shots with Dr. Bowie. Referred to ENT for evaluation.   Retested, allergic to more things here now.   Hx of sinus infection. Had CT scan. Scheduled for sinus surgery Dec 14th.     Meloxicam- takes daily for arthritis. Started on 7.5 mg, takes up to 15 mg.   Stretching exercise daily. Nephew is a physical therapist, gave her exercise before. Daughter is physical therapy also.    Naltrexone therapy per Dr. Herman, was having trouble sleeping, arthritis, etc.    passed away in January-had cancer- chemo caused renal failure, diabetes, and was driving from [place] to [place] for dialysis.   Lives here now. Has family support.     Heart burn-related to diet. Sugar worsens it. PPI therapy in past. Currently stable.   After PPI, osteopenia on bone density- on calcium and vitamin K. Vitamin D.   Hx of CTS surgery bilateral wrist. Now arthritis remains in area.    Elevated fasting glucose on labs. Was eating better when cooking for her .     Father had colon cancer, 86 yo,  of lung cancer.     White coat syndrome. Not regularly checking at home currently.   Notes prior hx of irregularity of heart beat. Last ekg with CTS surgery.   No echocardiogram.   No heart racing symptoms, shortness of breath or chest pain.     History of left lower extremity varicose veins and ablative therapy.  No current issues.  Also with left knee problem for which she has seen orthopedics in the past.    Lifestyle:  Diet: varied, better in past  Exercise/Activities: not routine, recently moved after selling her condo.   Stressors: better.   Social Support: family in [place]  Work: retired, office work    Health Maintenance:  Last pap:  hysterectomy, fibroid  and excess bleeding  Immunizations: reviewed- had pneumonia vaccine x2 in past, no shingles vaccine in past. Uncertain of Tdap. Had covid infection prior to 2 vaccine, no booster yet. Need records.  Mammogram: 5/2021- focal asymmetry, left  Colonoscopy: never  Dexa Scan: 5/2021-osteopenia  Hepatitis C screening: due  Labs 6/2021- [id number], glucose 101, trace ketones.     Health Maintenance Due   Topic Date Due   • Annual Wellness Visit  Never done   • COLORECTAL CANCER SCREENING  Never done   • HEPATITIS C SCREENING  Never done   • IMM DTaP/Tdap/Td Vaccine (1 - Tdap) Never done   • PAP SMEAR  Never done   • IMM ZOSTER VACCINES (1 of 2) Never done   • IMM PNEUMOCOCCAL VACCINE: 65+ Years (1 of 1 - PPSV23) Never done   • IMM INFLUENZA (1) Never done   • COVID-19 Vaccine (3 - Booster for Pfizer series) 10/25/2021       Immunization History   Administered Date(s) Administered   • Pfizer SARS-CoV-2 Vaccine 12+ 04/04/2021, 04/25/2021       Review of Systems   Constitutional: Negative for fever, chills and malaise/fatigue.   HENT: Negative for congestion, sore throat, or swallowing issues.   Eyes: Negative for pain or vision changes.   Respiratory: Negative for cough and shortness of breath.    Cardiovascular: Negative for leg swelling. No chest pain.   Gastrointestinal: Negative for nausea, vomiting, abdominal pain and diarrhea.   Genitourinary: Negative for dysuria and hematuria.   Skin: Negative for rash.   Neurological: Negative for dizziness, focal weakness and headaches.   Endo/Heme/Allergies: Does not bruise/bleed easily.   Psychiatric/Behavioral: Negative for depression.  The patient is not nervous/anxious.        Current Outpatient Medications:   •  meloxicam (MOBIC) 15 MG tablet, Take 15 mg by mouth every day., Disp: , Rfl:   •  montelukast (SINGULAIR) 10 MG Tab, TAKE 1 TABLET BY MOUTH ONE TIME DAILY AT BEDTIME, Disp: , Rfl:   •  NALTREXONE HCL PO, naltrexone  4.5 mg @ HS, Disp: , Rfl:   •  fluticasone (FLONASE) 50  "MCG/ACT nasal spray, fluticasone propionate 50 mcg/actuation nasal spray,suspension, Disp: , Rfl:   •  Calcium Carbonate Antacid (BURT-MINTS) 850 MG CHEW, Take 1 Tab by mouth., Disp: , Rfl:   •  acetaminophen (TYLENOL) 500 MG TABS, Take 1,000 mg by mouth every 6 hours as needed., Disp: , Rfl:   •  cetirizine (ZYRTEC) 10 MG TABS, Take 10 mg by mouth 2 times a day., Disp: , Rfl:     Allergies   Allergen Reactions   • Codeine Nausea   • Penicillins Hives   • Sulfa Drugs Rash and Itching   • Morphine Nausea     confusion   • Trimethoprim        Patient Active Problem List   Diagnosis   • Carpal tunnel syndrome   • Osteopenia of multiple sites   • Environmental and seasonal allergies   • Elevated fasting glucose       Past Medical History:   Diagnosis Date   • Anesthesia     PONV   • Arthritis    • Heart burn    • Indigestion    • Pain 06/06/14    both wrists hurt with activity-numb   • Unspecified cataract     Bilateral IOL's       Past Surgical History:   Procedure Laterality Date   • CARPAL TUNNEL RELEASE  7/8/2014    Performed by Herson Hedrick M.D. at SURGERY [facility] ORS   • CARPAL TUNNEL RELEASE  6/16/2014    Performed by Herson Hedrick M.D. at SURGERY [facility] ORS   • OTHER  2011    Bilateral IOL's       Family History   Problem Relation Age of Onset   • Alcohol abuse Mother         liver issues   • Cancer Father         lung and colon   • Stroke Sister    • Hypertension Sister        Social History     Tobacco Use   • Smoking status: Never Smoker   • Smokeless tobacco: Never Used   Vaping Use   • Vaping Use: Never used   Substance Use Topics   • Alcohol use: No   • Drug use: No         PHYSICAL EXAM:  BP (!) 164/78   Pulse (!) 54   Temp 36.9 °C (98.5 °F)   Resp 12   Ht 1.753 m (5' 9\")   Wt 100 kg (221 lb)   SpO2 95%   BMI 32.64 kg/m²   GEN: Well-developed well-nourished female.  HEAD AND NECK:  Ears normal.  Patient wearing mask.  Neck supple. No adenopathy or masses in the neck or supraclavicular " regions.  No carotid bruits. No thyromegaly.   NEURO: Cranial nerves normal in visible areas.  Neck supple. DTR's normal and symmetric.    CHEST:  Clear, good air entry, no wheezes, rhonci or rales.   HEART:  S1 and S2 normal, no murmurs, clicks or rubs.  Regular rate, irregular rhythm due to bigmeny noted. No edema or JVD.   ABDOMEN:  Soft without tenderness, guarding, mass or organomegaly. No CVA tenderness or inguinal adenopathy.   EXTREMITIES:  Extremities, reflexes and peripheral pulses are normal.    SKIN:  No rashes or suspicious skin lesions noted.     EKG:  HR 92, 88. Bigeminy noted.     IMPRESSION:     1.  Ultrasound-guided cyst aspiration of a cyst in the left breast at the 2:30 position.     2.  No solid mass was present and no biopsy was performed.     3.  Bilateral follow-up mammography in June of next year is recommended.             Exam Ended: 06/10/21  2:06 PM Last Resulted: 06/10/21  2:12 PM             ASSESSMENT/PLAN:    This is a 76 y.o. female new patient.     1. Environmental and seasonal allergies     2. Sinus problem     3. Arthralgia, unspecified joint     4. Heart burn     5. Osteopenia of multiple sites  VITAMIN D,25 HYDROXY    5/2021 dexa   6. Elevated fasting glucose  Blood Glucose    HEMOGLOBIN A1C   7. Family history of colon cancer in father  Referral to GI for Colonoscopy   8. Screen for colon cancer  Referral to GI for Colonoscopy   9. Elevated BP without diagnosis of hypertension     10. Irregular heart beat  EKG    EC-ECHOCARDIOGRAM COMPLETE W/O CONT    TSH WITH REFLEX TO FT4    MAGNESIUM   11. Abnormal EKG  EC-ECHOCARDIOGRAM COMPLETE W/O CONT    TSH WITH REFLEX TO FT4    MAGNESIUM   12. Bigeminy     13. History of varicose veins of lower extremity     14. Knee problem     15. Need for hepatitis C screening test  HEP C VIRUS ANTIBODY   16. Need for influenza vaccination  INFLUENZA VACCINE, HIGH DOSE (65+ ONLY)   -Environmental and seasonal allergies-seeing allergist.  Stable.   Continue current medication.  -Sinus problem-scheduled for sinus surgery with ENT.  Continue current medications.  Recommend anti-inflammatory/sinus diet  which was reviewed further in detail.  Patient given handout.  -Arthralgia-stable.  May continue current medication with meloxicam 7.5 mg to 15 mg as needed.  Recommend routine stretching and strengthening exercises.  May work with physical therapy as desired by patient. Recommend anti-inflammatory diet.  -Heartburn-stable.  Intermittent.  May be related to diet.  Monitor.  Modify diet. Medication as needed.  -Osteopenia-recommend weightbearing and adequate calcium vitamin D.  Monitor DEXA in future.  -Elevated fasting glucose-recommend further lab work.  Recommend low sugar/low processed food diet and routine exercise.  We will continue monitor.  -Family history of colon cancer-referral for colonoscopy.  If unable to complete colonoscopy discussed obtaining FIT or Cologuard testing.  -Elevated blood pressure-notes hx of white coat hypertension. Repeat  BP was slightly higher. Asymptomatic.   Recommend monitor at home and report if any continued elevated BP, consider medication therapy at that time.   -Irregular heartbeat-abnormal EKG, bigeminy noted.  Recommend echocardiogram evaluation.  -History of varicose veins of lower extremity on left.  Stable.  -Knee [id number] with orthopedist.    Return in about 6 weeks (around 1/12/2022).      This medical record contains text that has been entered with the assistance of computer voice recognition and dictation software.  Therefore, it may contain unintended errors in text, spelling, punctuation, or grammar.

## 2021-12-02 ENCOUNTER — PRE-ADMISSION TESTING (OUTPATIENT)
Dept: ADMISSIONS | Facility: MEDICAL CENTER | Age: 76
End: 2021-12-02
Attending: OTOLARYNGOLOGY
Payer: MEDICARE

## 2021-12-02 DIAGNOSIS — Z01.812 PRE-OPERATIVE LABORATORY EXAMINATION: ICD-10-CM

## 2021-12-02 LAB
ERYTHROCYTE [DISTWIDTH] IN BLOOD BY AUTOMATED COUNT: 42.9 FL (ref 35.9–50)
EST. AVERAGE GLUCOSE BLD GHB EST-MCNC: 114 MG/DL
HBA1C MFR BLD: 5.6 % (ref 4–5.6)
HCT VFR BLD AUTO: 41.3 % (ref 37–47)
HGB BLD-MCNC: 13.6 G/DL (ref 12–16)
MCH RBC QN AUTO: 30 PG (ref 27–33)
MCHC RBC AUTO-ENTMCNC: 32.9 G/DL (ref 33.6–35)
MCV RBC AUTO: 91 FL (ref 81.4–97.8)
PLATELET # BLD AUTO: 212 K/UL (ref 164–446)
PMV BLD AUTO: 12.1 FL (ref 9–12.9)
RBC # BLD AUTO: 4.54 M/UL (ref 4.2–5.4)
WBC # BLD AUTO: 9.3 K/UL (ref 4.8–10.8)

## 2021-12-02 PROCEDURE — 83036 HEMOGLOBIN GLYCOSYLATED A1C: CPT

## 2021-12-02 PROCEDURE — 85027 COMPLETE CBC AUTOMATED: CPT

## 2021-12-02 PROCEDURE — 36415 COLL VENOUS BLD VENIPUNCTURE: CPT

## 2021-12-02 ASSESSMENT — FIBROSIS 4 INDEX: FIB4 SCORE: 2.03

## 2021-12-08 ENCOUNTER — HOSPITAL ENCOUNTER (OUTPATIENT)
Dept: CARDIOLOGY | Facility: MEDICAL CENTER | Age: 76
End: 2021-12-08
Attending: FAMILY MEDICINE
Payer: MEDICARE

## 2021-12-08 DIAGNOSIS — I49.9 IRREGULAR HEART BEAT: ICD-10-CM

## 2021-12-08 DIAGNOSIS — R94.31 ABNORMAL EKG: ICD-10-CM

## 2021-12-08 PROCEDURE — 93306 TTE W/DOPPLER COMPLETE: CPT

## 2021-12-09 LAB
LV EJECT FRACT  99904: 60
LV EJECT FRACT MOD 2C 99903: 57.16
LV EJECT FRACT MOD 4C 99902: 66.82
LV EJECT FRACT MOD BP 99901: 62.72

## 2021-12-09 PROCEDURE — 93306 TTE W/DOPPLER COMPLETE: CPT | Mod: 26 | Performed by: INTERNAL MEDICINE

## 2021-12-14 ENCOUNTER — ANESTHESIA (OUTPATIENT)
Dept: SURGERY | Facility: MEDICAL CENTER | Age: 76
End: 2021-12-14
Payer: MEDICARE

## 2021-12-14 ENCOUNTER — ANESTHESIA EVENT (OUTPATIENT)
Dept: SURGERY | Facility: MEDICAL CENTER | Age: 76
End: 2021-12-14
Payer: MEDICARE

## 2021-12-14 ENCOUNTER — HOSPITAL ENCOUNTER (OUTPATIENT)
Facility: MEDICAL CENTER | Age: 76
End: 2021-12-14
Attending: OTOLARYNGOLOGY | Admitting: OTOLARYNGOLOGY
Payer: MEDICARE

## 2021-12-14 VITALS
HEART RATE: 97 BPM | TEMPERATURE: 97.8 F | BODY MASS INDEX: 32.13 KG/M2 | RESPIRATION RATE: 16 BRPM | HEIGHT: 69 IN | DIASTOLIC BLOOD PRESSURE: 70 MMHG | SYSTOLIC BLOOD PRESSURE: 153 MMHG | WEIGHT: 216.93 LBS | OXYGEN SATURATION: 93 %

## 2021-12-14 LAB
EXTERNAL QUALITY CONTROL: NORMAL
PATHOLOGY CONSULT NOTE: NORMAL
SARS-COV+SARS-COV-2 AG RESP QL IA.RAPID: NEGATIVE

## 2021-12-14 PROCEDURE — 87076 CULTURE ANAEROBE IDENT EACH: CPT

## 2021-12-14 PROCEDURE — 87070 CULTURE OTHR SPECIMN AEROBIC: CPT

## 2021-12-14 PROCEDURE — 88311 DECALCIFY TISSUE: CPT

## 2021-12-14 PROCEDURE — 87075 CULTR BACTERIA EXCEPT BLOOD: CPT

## 2021-12-14 PROCEDURE — 160046 HCHG PACU - 1ST 60 MINS PHASE II: Performed by: OTOLARYNGOLOGY

## 2021-12-14 PROCEDURE — A9270 NON-COVERED ITEM OR SERVICE: HCPCS | Performed by: OTOLARYNGOLOGY

## 2021-12-14 PROCEDURE — 700111 HCHG RX REV CODE 636 W/ 250 OVERRIDE (IP): Performed by: OTOLARYNGOLOGY

## 2021-12-14 PROCEDURE — 160025 RECOVERY II MINUTES (STATS): Performed by: OTOLARYNGOLOGY

## 2021-12-14 PROCEDURE — 160048 HCHG OR STATISTICAL LEVEL 1-5: Performed by: OTOLARYNGOLOGY

## 2021-12-14 PROCEDURE — 700102 HCHG RX REV CODE 250 W/ 637 OVERRIDE(OP): Performed by: OTOLARYNGOLOGY

## 2021-12-14 PROCEDURE — 502573 HCHG PACK, ENT: Performed by: OTOLARYNGOLOGY

## 2021-12-14 PROCEDURE — 500331 HCHG COTTONOID, SURG PATTIE: Performed by: OTOLARYNGOLOGY

## 2021-12-14 PROCEDURE — 87205 SMEAR GRAM STAIN: CPT

## 2021-12-14 PROCEDURE — 700111 HCHG RX REV CODE 636 W/ 250 OVERRIDE (IP): Performed by: ANESTHESIOLOGY

## 2021-12-14 PROCEDURE — 700101 HCHG RX REV CODE 250: Performed by: OTOLARYNGOLOGY

## 2021-12-14 PROCEDURE — 87426 SARSCOV CORONAVIRUS AG IA: CPT | Performed by: OTOLARYNGOLOGY

## 2021-12-14 PROCEDURE — 160035 HCHG PACU - 1ST 60 MINS PHASE I: Performed by: OTOLARYNGOLOGY

## 2021-12-14 PROCEDURE — 160029 HCHG SURGERY MINUTES - 1ST 30 MINS LEVEL 4: Performed by: OTOLARYNGOLOGY

## 2021-12-14 PROCEDURE — 160041 HCHG SURGERY MINUTES - EA ADDL 1 MIN LEVEL 4: Performed by: OTOLARYNGOLOGY

## 2021-12-14 PROCEDURE — 87077 CULTURE AEROBIC IDENTIFY: CPT

## 2021-12-14 PROCEDURE — 88305 TISSUE EXAM BY PATHOLOGIST: CPT

## 2021-12-14 PROCEDURE — 700105 HCHG RX REV CODE 258: Performed by: ANESTHESIOLOGY

## 2021-12-14 PROCEDURE — 87186 SC STD MICRODIL/AGAR DIL: CPT

## 2021-12-14 PROCEDURE — 160009 HCHG ANES TIME/MIN: Performed by: OTOLARYNGOLOGY

## 2021-12-14 PROCEDURE — 700105 HCHG RX REV CODE 258: Performed by: OTOLARYNGOLOGY

## 2021-12-14 PROCEDURE — 160002 HCHG RECOVERY MINUTES (STAT): Performed by: OTOLARYNGOLOGY

## 2021-12-14 RX ORDER — EPINEPHRINE 1 MG/ML
INJECTION INTRAMUSCULAR; INTRAVENOUS; SUBCUTANEOUS
Status: DISCONTINUED | OUTPATIENT
Start: 2021-12-14 | End: 2021-12-14 | Stop reason: HOSPADM

## 2021-12-14 RX ORDER — DIPHENHYDRAMINE HYDROCHLORIDE 50 MG/ML
12.5 INJECTION INTRAMUSCULAR; INTRAVENOUS
Status: DISCONTINUED | OUTPATIENT
Start: 2021-12-14 | End: 2021-12-14 | Stop reason: HOSPADM

## 2021-12-14 RX ORDER — HALOPERIDOL 5 MG/ML
1 INJECTION INTRAMUSCULAR
Status: DISCONTINUED | OUTPATIENT
Start: 2021-12-14 | End: 2021-12-14 | Stop reason: HOSPADM

## 2021-12-14 RX ORDER — HYDROMORPHONE HYDROCHLORIDE 1 MG/ML
0.1 INJECTION, SOLUTION INTRAMUSCULAR; INTRAVENOUS; SUBCUTANEOUS
Status: DISCONTINUED | OUTPATIENT
Start: 2021-12-14 | End: 2021-12-14 | Stop reason: HOSPADM

## 2021-12-14 RX ORDER — HYDROMORPHONE HYDROCHLORIDE 1 MG/ML
0.2 INJECTION, SOLUTION INTRAMUSCULAR; INTRAVENOUS; SUBCUTANEOUS
Status: DISCONTINUED | OUTPATIENT
Start: 2021-12-14 | End: 2021-12-14 | Stop reason: HOSPADM

## 2021-12-14 RX ORDER — SODIUM CHLORIDE, SODIUM LACTATE, POTASSIUM CHLORIDE, CALCIUM CHLORIDE 600; 310; 30; 20 MG/100ML; MG/100ML; MG/100ML; MG/100ML
INJECTION, SOLUTION INTRAVENOUS CONTINUOUS
Status: DISCONTINUED | OUTPATIENT
Start: 2021-12-14 | End: 2021-12-14 | Stop reason: HOSPADM

## 2021-12-14 RX ORDER — OXYCODONE HCL 5 MG/5 ML
10 SOLUTION, ORAL ORAL
Status: DISCONTINUED | OUTPATIENT
Start: 2021-12-14 | End: 2021-12-14 | Stop reason: HOSPADM

## 2021-12-14 RX ORDER — SODIUM CHLORIDE, SODIUM LACTATE, POTASSIUM CHLORIDE, CALCIUM CHLORIDE 600; 310; 30; 20 MG/100ML; MG/100ML; MG/100ML; MG/100ML
INJECTION, SOLUTION INTRAVENOUS
Status: DISCONTINUED | OUTPATIENT
Start: 2021-12-14 | End: 2021-12-14 | Stop reason: SURG

## 2021-12-14 RX ORDER — OXYCODONE HCL 5 MG/5 ML
5 SOLUTION, ORAL ORAL
Status: DISCONTINUED | OUTPATIENT
Start: 2021-12-14 | End: 2021-12-14 | Stop reason: HOSPADM

## 2021-12-14 RX ORDER — EPINEPHRINE 1 MG/ML
INJECTION INTRAMUSCULAR; INTRAVENOUS; SUBCUTANEOUS
Status: DISCONTINUED
Start: 2021-12-14 | End: 2021-12-14 | Stop reason: HOSPADM

## 2021-12-14 RX ORDER — LIDOCAINE HYDROCHLORIDE AND EPINEPHRINE 10; 10 MG/ML; UG/ML
INJECTION, SOLUTION INFILTRATION; PERINEURAL
Status: DISCONTINUED | OUTPATIENT
Start: 2021-12-14 | End: 2021-12-14 | Stop reason: HOSPADM

## 2021-12-14 RX ORDER — ONDANSETRON 2 MG/ML
INJECTION INTRAMUSCULAR; INTRAVENOUS PRN
Status: DISCONTINUED | OUTPATIENT
Start: 2021-12-14 | End: 2021-12-14 | Stop reason: SURG

## 2021-12-14 RX ORDER — HYDROMORPHONE HYDROCHLORIDE 1 MG/ML
0.4 INJECTION, SOLUTION INTRAMUSCULAR; INTRAVENOUS; SUBCUTANEOUS
Status: DISCONTINUED | OUTPATIENT
Start: 2021-12-14 | End: 2021-12-14 | Stop reason: HOSPADM

## 2021-12-14 RX ORDER — DEXAMETHASONE SODIUM PHOSPHATE 4 MG/ML
INJECTION, SOLUTION INTRA-ARTICULAR; INTRALESIONAL; INTRAMUSCULAR; INTRAVENOUS; SOFT TISSUE PRN
Status: DISCONTINUED | OUTPATIENT
Start: 2021-12-14 | End: 2021-12-14 | Stop reason: SURG

## 2021-12-14 RX ORDER — LIDOCAINE HYDROCHLORIDE AND EPINEPHRINE 10; 10 MG/ML; UG/ML
INJECTION, SOLUTION INFILTRATION; PERINEURAL
Status: DISCONTINUED
Start: 2021-12-14 | End: 2021-12-14 | Stop reason: HOSPADM

## 2021-12-14 RX ORDER — MEPERIDINE HYDROCHLORIDE 25 MG/ML
12.5 INJECTION INTRAMUSCULAR; INTRAVENOUS; SUBCUTANEOUS
Status: DISCONTINUED | OUTPATIENT
Start: 2021-12-14 | End: 2021-12-14 | Stop reason: HOSPADM

## 2021-12-14 RX ORDER — ONDANSETRON 2 MG/ML
4 INJECTION INTRAMUSCULAR; INTRAVENOUS
Status: DISCONTINUED | OUTPATIENT
Start: 2021-12-14 | End: 2021-12-14 | Stop reason: HOSPADM

## 2021-12-14 RX ORDER — OXYMETAZOLINE HYDROCHLORIDE 0.05 G/100ML
2 SPRAY NASAL
Status: COMPLETED | OUTPATIENT
Start: 2021-12-14 | End: 2021-12-14

## 2021-12-14 RX ADMIN — FENTANYL CITRATE 50 MCG: 50 INJECTION, SOLUTION INTRAMUSCULAR; INTRAVENOUS at 10:51

## 2021-12-14 RX ADMIN — PROPOFOL 150 MG: 10 INJECTION, EMULSION INTRAVENOUS at 10:35

## 2021-12-14 RX ADMIN — DEXAMETHASONE SODIUM PHOSPHATE 12 MG: 4 INJECTION, SOLUTION INTRA-ARTICULAR; INTRALESIONAL; INTRAMUSCULAR; INTRAVENOUS; SOFT TISSUE at 10:50

## 2021-12-14 RX ADMIN — SODIUM CHLORIDE, POTASSIUM CHLORIDE, SODIUM LACTATE AND CALCIUM CHLORIDE: 600; 310; 30; 20 INJECTION, SOLUTION INTRAVENOUS at 09:58

## 2021-12-14 RX ADMIN — FENTANYL CITRATE 50 MCG: 50 INJECTION, SOLUTION INTRAMUSCULAR; INTRAVENOUS at 10:33

## 2021-12-14 RX ADMIN — Medication 100 MG: at 10:36

## 2021-12-14 RX ADMIN — OXYMETAZOLINE HCL 2 SPRAY: 0.05 SPRAY NASAL at 09:53

## 2021-12-14 RX ADMIN — ONDANSETRON 4 MG: 2 INJECTION INTRAMUSCULAR; INTRAVENOUS at 10:50

## 2021-12-14 RX ADMIN — SODIUM CHLORIDE, POTASSIUM CHLORIDE, SODIUM LACTATE AND CALCIUM CHLORIDE: 600; 310; 30; 20 INJECTION, SOLUTION INTRAVENOUS at 10:33

## 2021-12-14 ASSESSMENT — FIBROSIS 4 INDEX: FIB4 SCORE: 1.98

## 2021-12-14 ASSESSMENT — PAIN DESCRIPTION - PAIN TYPE
TYPE: SURGICAL PAIN

## 2021-12-14 ASSESSMENT — PAIN SCALES - GENERAL: PAIN_LEVEL: 1

## 2021-12-14 NOTE — DISCHARGE INSTRUCTIONS
After Visit Summary - Transition      Demographics     Address: Home Phone: Work Phone: Mobile Phone:    [address] [place] [address]    [phone] -- [phone]    SSN: Insurance: Marital Status: Episcopalian:    [id number] MEDICARE  Roman Catholic (None, None)      Additional Demographic Information     Date Of Birth Sex Race Ethnicity Preferred Language    1943 Male White Not of  or  Origin English      Family Information     None      After Visit Summary - Transition Location       Most Recent Value    Receiving Agency/Faciltiy Name [facility] Filed at: 05/18/2017 1057    Receiving Agency/Facility Phone Number [phone] Filed at: 05/18/2017 1057    Receiving Agency/Facility Type Home care Filed at: 05/18/2017 1057    Receiving Agency/Facility Comment Agency to contact facility to arrange appointments for [facility].  Filed at: 05/18/2017 1057           Patient Discharge Summary   5/17/2017    Len Kim            Please bring this medication reconciliation form to your next doctor’s appointment(s). Please update the form if you stop taking any of these medications or you start taking any new medications including over the counter medications. Also please carry a copy of this form with you at all times in the event of an emergency.    A copy of these discharge instructions was reviewed with and given to the patient/patient representative/Guardian/Caregiver at discharge.          The doctor who took care of you in the hospital     Provider Specialty    Brandan Ball MD Urology      Admission Dx     Bladder Stone, possible necrotic prostate tissue.      Problem List as of 5/18/2017  Date Reviewed: 5/4/2017             ICD-10-CM Priority Class Noted - Resolved    SVT (supraventricular tachycardia) (CMS/HCC) I47.1   Unknown - Present    Stroke (CMS/HCC) I63.9   Unknown - Present    Overview Signed 12/30/2014  3:39 PM by Nellie Pelayo        Sinus Surgery Overview  The sinuses are air-filled holes in the skull. They are connected to the nose and can get  infected leading to drainage, pain, etc. This may be caused by allergies, polyps, abnormal shape or  swelling inside the nose.  Medical therapies, such as antibiotics, steroids, nasal sprays and decongestants, will often  cure bouts of sinusitis. Sinus surgery is advocated in those patients who fail to improve after  medicines.  There are circumstances when immediate sinus surgery is warranted. Tumors of the  sinuses, whether benign or malignant, often require surgical removal. Surgery may be the only  option for some patients whose sinus condition aggravates other medical problems such as  asthma. Cancer or immunocompromised patients may require drainage for culture or for treatment  of a fungal infection.  In the past, surgeries requiring an incision under the lip (Da Silva-Rasheed) or face (external  ethmoidectomy) were used to drain sinus cavities. Most procedures are now performed using  endoscopic technology (small cameras through the nose), eliminating the need for external  incisions.  Endoscopic Sinus Surgery  Small rods of light with a camera (endoscope) are used to operate through your nostrils  into the sinuses. This does not involve any cuts on your face, but may be combined with other  external approaches, which may involve cuts. This surgery is usually done under general  anesthesia for your comfort. A CT scan will serve as a road map for your surgeon -- please bring it  with you to the clinic or operating room if you were given one.  Endoscopic sinus surgery has ushered in a new philosophy allowing the surgeon to target  the ostiomeatal complex (OMC), an area in the anterior ethmoid sinus region. Obstruction of the  OMC can lead to subsequent infection of the maxillary, frontal and sphenoid sinuses. Accordingly,  endoscopic sinus surgery removes thickened and diseased tissue blocking  NP     Patient and daughter deny having a stroke.         Prostate cancer (CMS/HCC) C61   Unknown - Present    Overview Signed 12/2/2013  3:54 PM by Brandan Ball MD     Grade 3+4 Dx: 2007. 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 Ball         Pulmonary hypertension, mild (CMS/HCC) I27.2 Low  Unknown - Present    Overview Signed 5/17/2017 12:13 PM by David Sanchez MD     Parox SVT on digoxin/Cardizem, Option of ablation for SVT  offered in the past,  family & patient declined.  12./9/15 Echo: EF 56%, DD, RVSP 34.4 mmHg-Mild P. Htn         Physical debility R53.81 Low  Unknown - Present    Overview Signed 5/17/2017 12:18 PM by David Sanchez MD     2/2 MS, legally  the C. Most of the  healthy tissue in the sinuses is undisturbed allowing for faster and better overall recovery.  Endoscopic surgery can also be utilized for removal of polyps, nasal masses and  sometimes straightening the septum to improve nasal airflow.    Following Endoscopic Sinus Surgery  The procedure generally lasts from one to four hours. You can expect to go home after the  procedure unless other medical conditions complicate recovery.  Full recovery may take several weeks. Dry blood, mucus and crusting in the nose will  occur, and may result in symptoms like a severe cold or sinus infection. It is important to begin  nasal irrigations with saline starting the day after surgery. We recommend the [employer] sinus  irrigation system. (Mix one gallon of distilled water with 8 teaspoons sea salt (non-iodized) and 4  teaspoons baking soda.) Flush out each side of your nose THREE times a day starting the day  after surgery. Bend forward - no need to flush too hard - as you may get the saline into your ears.  This will help clean the clots from your nose, and reduce scarring after surgery. Proper post-  operative care is essential to prevent scar formation and allow normal healing. This requires  regular visits back to your surgeon for endoscopic cleaning. Generally speaking, your first visit  back is the most difficult, and you may need someone with you to drive home after the visit.    THINGS TO DO    - Take pain medicines as prescribed. Many postoperative pain medications contain Tylenol  (acetaminophen). Do not take additional Tylenol with these prescription pain medications  (example: Norco, Percocet)  - Take antibiotics and prednisone, if prescribed  - Call Dr. Lugo’s office to make your next appointment if you don’t have one already. The first  postop visit is approximately 1-2 weeks following surgery  - Start your nasal irrigations the day after surgery, unless told not to  Recommended nasal rinse  blind         Does not initiate walking R26.2 Low  Unknown - Present    Overview Signed 5/17/2017  1:05 PM by David Sanchez MD     Hx of MS on Copaxone,Dr. Tobias .LE weakness with debility and gait instability.Used a walker-> now nonambulatory         Nursing home resident Z59.3 Low  Unknown - Present    Overview Signed 5/17/2017  4:03 PM by David Sanchez MD     Since 12/16 at [facility]         Do not resuscitate status Z66 Low  Unknown - Present    Nursing home resident Z59.3 Low  Unknown - Present    Overview Signed 5/17/2017  4:03 PM by David Sanchez MD     Since 12/16 at [facility]           Allergies as of 5/18/2017     No Known Allergies           Discharge Medications              What to Do with Your Medications      CONTINUE taking these medications which have NOT CHANGED        Details    Morning Noon Evening Bedtime As needed    acetaminophen 325 MG tablet   Commonly known as:  TYLENOL   Next Dose Due:  05/18/2017 evening        Take 650 mg by mouth 2 times daily.   Authorizing Provider:  Provider Not In System   Last Dose:  650 mg on 5/18/2017  9:01 AM                                  amoxicillin 500 MG capsule   Commonly known as:  AMOXIL   Next Dose Due:  05/18/2017        Take 1 capsule by mouth 3 times daily.   Authorizing Provider:  Senthil Levy                                     aspirin 81 MG tablet   Next Dose Due:  05/19/2017        Take 81 mg by mouth daily.                               VERONICA ANTIFUNGAL 2 % cream        Generic drug:  miconazole   Apply  topically as directed. Apply topically to buttocks as needed for irritation   Authorizing Provider:  Provider Not In System                               bisacodyl 10 MG suppository   Commonly known as:  DULCOLAX        Place 1 suppository rectally daily as needed for Constipation.                               calcium carbonate 500 MG chewable tablet   Commonly known as:  TUMS        Chew 1-2 tablets by  kit: http://StrongView.117go/Products/Sinus-Rinse/Sinus-Rinse-  Regular-Kit  Recommended video demonstrating nasal irrigation technique:  https://youtu.be/LT8wkDn1Kl5  - Sneeze or cough with your mouth open  - Eat a regular diet  - Take your pain medicines before your first post-operative visit  - Leave a positive review on Google    THINGS NOT TO DO    - Any heavy activity including lifting children and strenuous exercise for 1 week  - Blow your nose or pick at your nose  - Take aspirin or aspirin containing medicines, Advil, Motrin or any other NSAIDS unless cleared  by your physician  - Do not fly without your doctor’s clearance for 7 days after surgery  - Do not swim underwater (submerge head) for at least 2 weeks after surgery  Call the on call doctor immediately if any of the following occur:  1. Change in vision  2. Increased swelling around the eyes  3. Neck stiffness or deep head pain  4. Continued Nausea or Vomiting  5. Bright red blood that lasts more than ten minutes or causes choking  6. Fever over 101 degrees  7. Uncontrolled pain or any other concerning symptom  Dr. Lugo’s Contact Information:  - Office Phone: [phone]    Depression / Suicide Risk    As you are discharged from this [facility], it is important to learn how to keep safe from harming yourself.    Recognize the warning signs:  · Abrupt changes in personality, positive or negative- including increase in energy   · Giving away possessions  · Change in eating patterns- significant weight changes-  positive or negative  · Change in sleeping patterns- unable to sleep or sleeping all the time   · Unwillingness or inability to communicate  · Depression  · Unusual sadness, discouragement and loneliness  · Talk of wanting to die  · Neglect of personal appearance   · Rebelliousness- reckless behavior  · Withdrawal from people/activities they love  · Confusion- inability to concentrate     If you or a loved one observes any of  mouth every 8 hours as needed for Heartburn.                               CRANBERRY-VITAMIN C PO   Next Dose Due:  05/18/2017        Take 2 capsules by mouth 2 times daily. to prevent UTI   Authorizing Provider:  Provider Not In System                                  cyclobenzaprine 5 MG tablet   Commonly known as:  FLEXERIL        Take 5 mg by mouth 3 times daily as needed for Muscle spasms.                               digoxin 0.25 MG tablet   Commonly known as:  LANOXIN   Next Dose Due:  05/19/2017        Take 1 tablet by mouth daily.   Authorizing Provider:  Mercedes Bhagat   Last Dose:  250 mcg on 5/18/2017  9:02 AM                               dilTIAZem 120 MG 24 hr capsule   Commonly known as:  DILACOR XR   Next Dose Due:  05/18/2017 evening        Take 120 mg by mouth daily.                               docusate sodium 100 MG capsule   Commonly known as:  COLACE        Take 100 mg by mouth 2 times daily as needed for Constipation.   Authorizing Provider:  Kylie Barajas                               escitalopram 10 MG tablet   Commonly known as:  LEXAPRO   Next Dose Due:  05/19/2017        Take 10 mg by mouth daily.   Last Dose:  10 mg on 5/18/2017  9:02 AM                               famotidine 40 MG tablet   Commonly known as:  PEPCID   Next Dose Due:  05/18/2017        Take 40 mg by mouth 2 times daily.   Last Dose:  20 mg on 5/18/2017  9:02 AM                                  glatiramer 20 MG/ML prefilled syringe for injection   Commonly known as:  COPAXONE   Next Dose Due:  05/19/2017        Inject 1 mL into the skin daily.   Authorizing Provider:  Reynaldo Retana   Last Dose:  20 mg on 5/18/2017  9:08 AM                               HYDROcodone-acetaminophen 7.5-325 MG per tablet   Commonly known as:  NORCO        Take 1 tablet by mouth every 6 hours as needed for Pain.   Authorizing Provider:  David Sanchez   Last Dose:  1 tablet on 5/17/2017  8:47 PM                               Melatonin 1 MG  these behaviors or has concerns about self-harm, here's what you can do:  · Talk about it- your feelings and reasons for harming yourself  · Remove any means that you might use to hurt yourself (examples: pills, rope, extension cords, firearm)  · Get professional help from the community (Mental Health, Substance Abuse, psychological counseling)  · Do not be alone:Call your Safe Contact- someone whom you trust who will be there for you.  · Call your local CRISIS HOTLINE 840-8001 or 575-812-1082  · Call your local Children's Mobile Crisis Response Team [place] (133) 477-0817 or www.ActiveO  · Call the toll free National Suicide Prevention Hotlines   · National Suicide Prevention Lifeline 226-333-LFCG (1241)  · National Hope Line Network 800-SUICIDE (964-5689)   Tab        Take 1 mg by mouth as directed. 4mg at bedtime for sleep and then an additional 1mg as needed   Last Dose:  3 mg on 5/17/2017  9:43 PM                               MILK OF MAGNESIA PO        Take 30 mLs by mouth daily as needed.                               mupirocin 2 % ointment   Commonly known as:  BACTROBAN   Next Dose Due:  05/18/2017        Apply topically 3 times daily.   Authorizing Provider:  Velma Maldonado                                     Omega 3-6-9 Cap   Next Dose Due:  05/18/2017                                    oxybutynin 10 MG 24 hr tablet   Commonly known as:  DITROPAN-XL   Next Dose Due:  05/19/2017        Take 10 mg by mouth daily.   Last Dose:  10 mg on 5/18/2017  9:02 AM                               phenylephrine 1 % nasal spray   Commonly known as:  ARUNA-SYNEPHRINE        [place] 1-2 drops in each nostril every 4 hours as needed for Congestion.   Authorizing Provider:  Kylie Barajas                               tamsulosin 0.4 MG Cap   Commonly known as:  FLOMAX   Next Dose Due:  05/18/2017 evening        Take 1 capsule by mouth daily after a meal.   Authorizing Provider:  Leandro Man   Last Dose:  0.4 mg on 5/17/2017  8:47 PM                               trazodone 150 MG tablet   Commonly known as:  DESYREL        Take 150 mg by mouth nightly. Indications: one tablet by mouth at bedtime for insomnia   Last Dose:  150 mg on 5/17/2017  8:47 PM                               triamcinolone 0.1 % cream   Commonly known as:  ARISTOCORT        Apply topically 2 times daily.                                  vitamin - therapeutic multivitamins w/minerals Tab   Commonly known as:  CENTRUM SILVER,THERA-M        Take 1 tablet by mouth daily.   Authorizing Provider:  Leandro Man                               vitamin B-12 1000 MCG tablet   Commonly known as:  CYANOCOBALAMIN        Take 1 tablet by mouth daily.   Last Dose:  1,000 mcg on 5/18/2017  9:02 AM                                vitamin C 500 MG tablet        Take 500 mg by mouth daily.                               VITAMIN D (CHOLECALCIFEROL) PO        Take 5,000 Units by mouth daily.   Authorizing Provider:  Provider Not In System                                                           Where to Get Your Medications      You are receiving a paper prescription for the following medications, you can take these to any pharmacy.     Bring a paper prescription for each of these medications     HYDROcodone-acetaminophen 7.5-325 MG per tablet               Contact your doctor for follow-up appointment if not already scheduled.     Follow up with Brandan Ball MD On 6/1/2017.    Specialty:  Urology    Comments:  postop check scheduled at  1:30  PM      Contact information    [address] [place] [address] [phone]          Follow up with [facility].    Contact information    [address] [facility] [id number] [phone]          Follow up with Velma Maldonado NP.    Specialty:  Nurse Practitioner    Comments:  As needed    Contact information    [address] [place] [id number] [phone]        Additional Instructions    To promote your recovery after you leave the hospital, your physician has ordered home care Skilled Nursing. [facility] will contact you within 2 days to set up the first appointment.  If you have any questions please contact [facility] at [phone] or [phone]. Thank you for choosing [facility].        Pending Labs/Results     Any lab or diagnostic test results that are \"pending\" at time of discharge are listed below. If no results display then none were \"pending\" at time of discharge. Depending on when the test was completed results may be available within 3-5 days. Results can be reviewed with your provider at your next visits or through [facility]. If needed contact the provider office for additional information.          Order Current Status    Surgical Pathology In process       Height and Weight     Date and Time Height Weight Birthweight BMI (Calculated) [facility]    05/17/17 0942 6' 6\" (1.981 m) -- -- -- GMV      Smoking Status     Tobacco Use     Former Smoker;  Quit 1/1/2003; Smoked 1.5 packs/day for 40 years; Smoked: Cigarettes.    Smokeless Tobacco:  Never used smokeless tobacco.            Code Status Information     Code Status    Prior      Diet     None      Your To Do List     Future Appointments Provider Department Dept Phone    6/1/2017 1:30 PM MD [facility] [address] [phone]    6/5/2017 11:00 AM Mercedes Bhagat MD [facility] [phone]    10/17/2017 3:30 PM MD [facility] [phone]    Future Orders Complete By Expires    SERVICE TO HOME CARE NURSING/THERAPY  As directed       History of Falls       Most Recent Value    History of Falling 0 Filed at: 05/18/2017 0900      Functional & Cognitive Status       Most Recent Value    Are you deaf or do you have serious difficulty  hearing?  N    Are you blind or do you have serious difficulty seeing, even when wearing glasses? Y    Because of a physical, mental, or emotional condition, do you have serious difficulty concentrating, remembering or making decisions?  N    Do you have serious difficulty walking or climbing stairs? Y    Do you have difficulty dressing or bathing? Y    Because of a physical, mental, or emotional condition, do you have difficulty doing errands alone? Y      Isolation     No Isolation      Vital Signs and Pain Assessment       Most Recent Value    Temperature 98 °F (36.7 °C) Filed at: 05/18/2017 1116    Temperature Source Temporal Art Filed at: 05/18/2017 1116    Heart Rate 79 Filed at: 05/18/2017 1116    Heart Rate Source Monitor Filed at: 05/18/2017 1116    BP 97/54 Filed at: 05/18/2017 1116    BP Method Automatic Filed at: 05/18/2017 1116    Resp Rate 18 Filed at: 05/18/2017 1116    Pulse Ox 96  % Filed at: 05/18/2017 1116    Pain Type Acute pain Filed at: 05/18/2017 0954    Pain at Discharge 0 Filed at: 05/18/2017 0954      Patient Lines/Drains/Airways Status    Active LDA's (Lines/Drains/Airways/Wounds)        Peripheral IV 05/17/17 Left Antecubital 20    Placement date: 05/17/17   Site:       Placement time: 1008   Days: 1    Securement: Transparent dressing;Tape   Size (Gauge): 20     Prep: Chlorhexidine with alcohol   Location: Antecubital     Inserted by: JOSS Rao   Local Anesthetic: None     Laterality: Left           Assessments         05/18/17 0729 05/17/17 1946 05/17/17 1200 05/17/17 1008      Site Assessment WDL   WDL   WDL   WDL      Dressing Type Transparent   Transparent   Transparent   Transparent      Dressing Activity Monitored   Monitored   Monitored   Applied      Line Status          Blood return noted      Interventions    Other (comment) ananth                       Urinary Catheter 05/18/17 Indwelling 16 fr    Placement date: 05/18/17   Site:       Placement time: 0956   Days: less than 1    Prep: Bee care performed prior to insertion   Inserted by: Maxine Kumar RN     Collection Container: Other (comment) belly bag per order Securement Method: Other (Comment) belly bag   Catheter Type: Indwelling   Catheter Size (fr): 16 fr     Balloon Size (mL): 10              Wound Penis Surgical incision    Date First Assessed 05/17/17        Time First Assessed    Days: 1    Wound Type: Surgical incision   Location: Penis         Assessments         05/17/17 2227 05/17/17 1945 05/17/17 1451 05/17/17 1225 05/17/17 1134   Dressing Assessment Clean;Dry;Intact   Clean;Dry;Intact   Clean;Dry;Intact   Clean;Dry;Intact   Drainage present     Dressing Activity Monitored   Monitored   Monitored   Monitored   Monitored     Wound Exudate Small;Sanguineous   Small;Sanguineous   Small;Sanguineous      Small                       Assessment       Most Recent Value    Sensory Support Devices Eyeglasses  Filed at: 05/17/2017 1300    Interventions for Safe Feeding No straws, Feeding supervision Filed at: 05/17/2017 1300    Mobility: Level of Assistance Moderate assist Filed at: 05/18/2017 0958    Mobility Assist Device Slide board/sheet Filed at: 05/18/2017 0958      Learning Assessment Questions     Does the primary learner have any barriers to learning?:  No Barriers Karen Groves RN - RN (Nurse) - 05/16/17 1015    What is the primary language of the primary learner?:  English Karen Groves RN - RN (Nurse) - 05/16/17 1015    Is an  required?:  No Karen Groves RN - RN (Nurse) - 05/16/17 1015    How does the primary learner prefer to learn new concepts?:  Listening Karen Groves RN - RN (Nurse) - 05/16/17 1015      Disposition       Most Recent Value    Receiving facility name [facility] Filed at: 05/17/2017 1500        Discharge Instructions       MEDICATIONS:  · See Medication List (bring to your doctor appointments).      VACCINES:  Most Recent Immunizations   Administered Date(s) Administered   • Influenza 12/08/2015   • Influenza A novel H1N1 12/23/2009   • Influenza High Dose Pres Free 12/08/2015   • Pneumococcal Polysaccharide Adult 12/12/2015   • Td:Adult type tetanus/diphtheria 11/07/2010       ACTIVITY:  · Weigh yourself daily (first thing in the morning, with same amount of clothes on) unless told otherwise by your doctor.  · Continue activity as you were in the hospital, slowly increase to what you were doing previously.  · Other: Yannick    SMOKING:  · Avoid all tobacco products and secondhand smoke.  · Smoking Cessation Counseling offered.  · [place] Toll Free Quit Line: [phone]    DIET:  · Limit salt and salty foods unless told otherwise by your doctor.  · No Restrictions    · Trouble breathing or chest pain - CALL 911    CONTACT YOUR DOCTOR IF:  · You have symptoms that are not \"normal\" for you.  · You have new or worse symptoms or pain, not relieved by  medicine or rest.  · Temperature greater than 101 degrees F, chills or flu like symptoms.  · You gain more than 3 pounds in 2 days.  · Increased swelling, redness or drainage.    REFERRALS (Type/Agency/Phone):  · Other: Home care      Discharge References/Attachments     None      Recommendations for Discharge        Recommendations for Discharge Planning    Recommendations for Discharge: Nursing - Medication Strategies    Recommendations for Discharge: SW/CM    Recommendations for Discharge: RT    Recommendation for Discharge: PT    Recommendations for Discharge: OT    Recommendations for Discharge: SLP    Recommendations for Discharge: Nutrition                         Patient Care Team        Relationship Specialty Notifications Start End    Velma Maldonado NP PCP - General Nurse Practitioner  11/19/14       Lab Results     Procedure Component Value Units Date/Time    Basic Metabolic Panel [[id number]]  (Abnormal) Collected:  05/18/17 0601    Order Status:  Completed Specimen:  Blood+grp Updated:  05/18/17 0642     Sodium 141 mmol/L      Potassium 4.6 mmol/L      Chloride 106 mmol/L      Carbon Dioxide 26 mmol/L      Anion Gap 14 mmol/L      Glucose 151 (H) mg/dL      BUN 12 mg/dL      Creatinine 0.86 mg/dL      GFR Estimate,  >90      eGFR results = or >90 mL/min/1.73m2 = Normal kidney function.        GFR Estimate, Non  87      eGFR 60 - 89 mL/min/1.73m2 = Mild decrease in kidney function.        BUN/Creatinine Ratio 14     CALCIUM 8.7 mg/dL     CBC & Auto Differential [[id number]]  (Abnormal) Collected:  05/18/17 0601    Order Status:  Completed Specimen:  Blood+grp Updated:  05/18/17 0624     WBC 10.0 K/mcL      RBC 4.07 (L) mil/mcL      HGB 13.0 g/dL      HCT 38.5 (L) %      MCV 94.6 fl      MCH 31.9 pg      MCHC 33.8 g/dL      RDW-CV 14.1 %       K/mcL      DIFF TYPE AUTOMATED DIFFERENTIAL     Neutrophil 77 %      LYMPH 8 %      MONO 15 %      EOSIN 0 %      BASO 0 %       Absolute Neutrophil 7.7 K/mcL      Absolute Lymph 0.8 (L) K/mcL      Absolute Mono 1.5 (H) K/mcL      Absolute Eos 0.0 (L) K/mcL      Absolute Baso 0.0 K/mcL       Microbiology Results     None

## 2021-12-14 NOTE — OR SURGEON
Immediate Post OP Note    PreOp Diagnosis: chronic left maxilary and ethmoid sinusitis      PostOp Diagnosis: Same      Procedure(s):  ENDOSCOPY, MAXILLARY ANTROSTOMY, ANTERIOR ETHMOIDECTOMY - Wound Class: Clean Contaminated    Surgeon(s):  Yobany Lugo M.D.    Anesthesiologist/Type of Anesthesia:  Anesthesiologist: Deion Sethi M.D./General    Surgical Staff:  Circulator: Janine Talley R.N.  Scrub Person: Raquel Prince    Specimens removed if any:  ID Type Source Tests Collected by Time Destination   1 : left maxillary Other Other AFB CULTURE, AEROBIC/ANAEROBIC CULTURE (SURGERY) Yobany Lugo M.D. 12/14/2021 10:57 AM    A : Left sinus content Other Other PATHOLOGY SPECIMEN Yobany Lugo M.D. 12/14/2021 10:56 AM        Estimated Blood Loss: 10cc    Findings: Pus in the L maxillary sinus    Complications: None        12/14/2021 11:22 AM Yobany Lugo M.D.

## 2021-12-14 NOTE — ANESTHESIA POSTPROCEDURE EVALUATION
Patient: Giselle Ramon    Procedure Summary     Date: 12/14/21 Room / Location: [facility] ROOM 22 / SURGERY SAME DAY [facility]    Anesthesia Start: 1033 Anesthesia Stop: 1112    Procedure: ENDOSCOPY, PARANASAL SINUS, WITH TOTAL ETHMOIDECTOMY, SPHENOIDOTOMY, MAXILLARY ANTROSTOMY, SPHEN+MAX SIN TISS REM, AND EXPLORATION FRONT SIN - ANTERIOR ETHMOIDECTOMY (Left Nose) Diagnosis: (chronic left maxillary and ethmoid sinusitis)    Surgeons: Yobany Lugo M.D. Responsible Provider: Deion Sethi M.D.    Anesthesia Type: general ASA Status: 3          Final Anesthesia Type: general  Last vitals  BP   Blood Pressure : (!) 162/81    Temp   36.1 °C (97 °F)    Pulse   76   Resp   18    SpO2   95 %      Anesthesia Post Evaluation    Patient location during evaluation: PACU  Patient participation: complete - patient participated  Level of consciousness: awake and alert  Pain score: 1    Airway patency: patent  Anesthetic complications: no  Cardiovascular status: hemodynamically stable  Respiratory status: acceptable  Hydration status: euvolemic    PONV: none          No complications documented.     Nurse Pain Score: 0 (NPRS)

## 2021-12-14 NOTE — OR NURSING
1111 Patient arrived to PACU from OR. Report received. Patient attached to monitoring. VSS. Patient oxygenating well on 6 L via mask.     1110 IV removed.     1230 RN went over discharge informations with patients daughter.     1235 Patient discharged via wheelchair by RN.

## 2021-12-14 NOTE — ANESTHESIA TIME REPORT
Anesthesia Start and Stop Event Times     Date Time Event    12/14/2021 0935 Ready for Procedure     1033 Anesthesia Start     1112 Anesthesia Stop        Responsible Staff  12/14/21    Name Role Begin End    Deion Sethi M.D. Anesth 1033 1112        Preop Diagnosis (Free Text):  Pre-op Diagnosis     chronic left maxillary and ethmoid sinusitis        Preop Diagnosis (Codes):    Premium Reason  Non-Premium    Comments:

## 2021-12-14 NOTE — ANESTHESIA PREPROCEDURE EVALUATION
Case: [id number] Date/Time: 12/14/21 1015    Procedure: ENDOSCOPY, PARANASAL SINUS, WITH TOTAL ETHMOIDECTOMY, SPHENOIDOTOMY, MAXILLARY ANTROSTOMY, SPHEN+MAX SIN TISS REM, AND EXPLORATION FRONT SIN - ANTERIOR ETHMOIDECTOMY (Left )    Pre-op diagnosis: CHRONIC SINUSITIS, UNSPECIFIED    Location: [facility] ROOM 22 / SURGERY SAME DAY [facility]    Surgeons: Yobany Lugo M.D.          Relevant Problems   No relevant active problems       Physical Exam    Airway   Mallampati: II  TM distance: >3 FB  Neck ROM: full       Cardiovascular - normal exam  Rhythm: regular  Rate: normal  (-) murmur     Dental - normal exam           Pulmonary - normal exam  Breath sounds clear to auscultation     Abdominal    Neurological - normal exam                 Anesthesia Plan    ASA 3       Plan - general       Airway plan will be ETT          Induction: intravenous    Postoperative Plan: Postoperative administration of opioids is intended.    Pertinent diagnostic labs and testing reviewed    Informed Consent:    Anesthetic plan and risks discussed with patient.    Use of blood products discussed with: patient whom consented to blood products.

## 2021-12-14 NOTE — OP REPORT
DATE OF SERVICE: 12/14/21    SURGEON:  Yobany Lugo MD     ANESTHESIOLOGIST: Dr. Navdeep MD     ANESTHESIA:  General endotracheal.     PREOPERATIVE DIAGNOSIS:    1) Chronic sinusitis of left ethmoid and maxillary sinuses     POSTOPERATIVE DIAGNOSIS:    1) Chronic sinusitis of left ethmoid and maxillary sinuses     PROCEDURES PERFORMED:  1.  Left endoscopic maxillary antrostomy  2.  Left endoscopic anterior ethmoidectomy     INDICATIONS FOR PROCEDURE:  Chronic left maxillary sinusitis with anterior ethmoid sinusitis despite weeks of abx and prednisone.  Risks,   benefits, and alternatives were discussed with the patient preoperatively and   she gave her informed written consent.     OPERATIVE FINDINGS:  Purulence in the left maxillary sinus     DESCRIPTION OF PROCEDURE:  The patient was identified in the preoperative   holding area and brought to the operating room then intubated by without difficulty. IV steroids were given.  Under   endoscopic guidance, 1% lidocaine with 1:100,000 epinephrine was injected into   the maxillary line.  The maxillary sinus was probed with a ball-tipped and the uncinate was infractured.  Inferior incision was made with the backbiter and the uncinate was removed in its entirety with a 90 degree Blakesley.  The maxillary sinus opening was widened using straight Gilson-Cut's and the 40 degree microdebrider. There was purulence in the maxillary sinus that was cultured. The ethmoid bulla was probed with a J curette and the retromolar recess was fractured anteriorly.  The ethmoid air cells were dissected with the microdebrider. The maxillary sinus was irrigated with normal saline. Hemostasis was achieved with epinephrine soaked pledgets.  The patient was   extubated and brought to the postanesthesia care unit in good condition.     ESTIMATED BLOOD LOSS:  Less than 10 mL.     URINE OUTPUT:  Not recorded.     COMPLICATIONS:  None.     SPECIMENS REMOVED:  1.  Left nasal culture.  2.  Left sinus  contents for permanent.

## 2021-12-14 NOTE — ANESTHESIA PROCEDURE NOTES
Airway    Date/Time: 12/14/2021 10:40 AM  Performed by: Deion Sethi M.D.  Authorized by: Deion Sethi M.D.     Location:  OR  Urgency:  Elective  Indications for Airway Management:  Anesthesia      Spontaneous Ventilation: absent    Sedation Level:  Deep  Preoxygenated: Yes    Patient Position:  Sniffing  Final Airway Type:  Endotracheal airway  Final Endotracheal Airway:  ETT  Cuffed: Yes    Technique Used for Successful ETT Placement:  Direct laryngoscopy    Insertion Site:  Oral  Blade Type:  Ruiz  Laryngoscope Blade/Videolaryngoscope Blade Size:  2  ETT Size (mm):  7.5  Measured from:  Teeth  ETT to Teeth (cm):  22  Placement Verified by: auscultation and capnometry    Cormack-Lehane Classification:  Grade I - full view of glottis  Number of Attempts at Approach:  1

## 2021-12-15 LAB
GRAM STN SPEC: NORMAL
SIGNIFICANT IND 70042: NORMAL
SITE SITE: NORMAL
SOURCE SOURCE: NORMAL

## 2021-12-17 LAB
BACTERIA WND AEROBE CULT: ABNORMAL
BACTERIA WND AEROBE CULT: ABNORMAL
GRAM STN SPEC: ABNORMAL
SIGNIFICANT IND 70042: ABNORMAL
SITE SITE: ABNORMAL
SOURCE SOURCE: ABNORMAL

## 2021-12-18 LAB
BACTERIA SPEC ANAEROBE CULT: ABNORMAL
BACTERIA SPEC ANAEROBE CULT: ABNORMAL
SIGNIFICANT IND 70042: ABNORMAL
SITE SITE: ABNORMAL
SOURCE SOURCE: ABNORMAL

## 2022-01-14 ENCOUNTER — TELEMEDICINE (OUTPATIENT)
Dept: MEDICAL GROUP | Facility: IMAGING CENTER | Age: 77
End: 2022-01-14
Payer: MEDICARE

## 2022-01-14 VITALS — HEIGHT: 69 IN | BODY MASS INDEX: 32.73 KG/M2 | WEIGHT: 221 LBS

## 2022-01-14 DIAGNOSIS — R73.01 ELEVATED FASTING GLUCOSE: ICD-10-CM

## 2022-01-14 DIAGNOSIS — M25.50 ARTHRALGIA, UNSPECIFIED JOINT: ICD-10-CM

## 2022-01-14 DIAGNOSIS — Z71.85 IMMUNIZATION COUNSELING: ICD-10-CM

## 2022-01-14 DIAGNOSIS — Z12.11 SCREEN FOR COLON CANCER: ICD-10-CM

## 2022-01-14 DIAGNOSIS — Z80.0 FAMILY HISTORY OF COLON CANCER IN FATHER: ICD-10-CM

## 2022-01-14 PROCEDURE — 99213 OFFICE O/P EST LOW 20 MIN: CPT | Mod: 95 | Performed by: FAMILY MEDICINE

## 2022-01-14 ASSESSMENT — PATIENT HEALTH QUESTIONNAIRE - PHQ9: CLINICAL INTERPRETATION OF PHQ2 SCORE: 0

## 2022-01-14 ASSESSMENT — PAIN SCALES - GENERAL: PAINLEVEL: NO PAIN

## 2022-01-14 ASSESSMENT — FIBROSIS 4 INDEX: FIB4 SCORE: 1.98

## 2022-01-14 NOTE — PROGRESS NOTES
Telemedicine: Established Patient   This evaluation was conducted via Zoom using secure and encrypted videoconferencing technology. The patient was in a private location in the state of [place].    The patient's identity was confirmed and verbal consent was obtained for this virtual visit.    Subjective:     Chief Complaint   Patient presents with   • Follow-Up     glucose levels       Giselle Ramon is a 76 y.o. female presenting for evaluation and management of: follow up of prior labs.   Hgba1c 4.6%, prior fasting glucose 101.  Notes she is working on her diet.    Has not complete other set of labs yet. Spoke with Dr. Herman, requesting to do labs in spring.   Had prior ENT/sinus procedure, lab work completed during that time.   Had echocardiogram done.  Denies any palpitations, chest pains or shortness of breath.  Does plan to complete Cologuard, has order from Dr. Herman.  We will hold on colonoscopy at this time.  Does have family history of colon cancer.  Arthralgia has otherwise been stable.  Knees issues, putting off with ortho at this time.   Reviewed immunization recommendations.    Health Maintenance Due   Topic Date Due   • Annual Wellness Visit  Never done   • HEPATITIS C SCREENING  Never done   • IMM DTaP/Tdap/Td Vaccine (1 - Tdap) Never done   • COLORECTAL CANCER SCREENING  Never done   • IMM ZOSTER VACCINES (1 of 2) Never done   • IMM PNEUMOCOCCAL VACCINE: 65+ Years (1 of 1 - PPSV23) Never done   • COVID-19 Vaccine (3 - Booster for Pfizer series) 09/25/2021         ROS  Denies any recent fevers or chills. No nausea or vomiting. No diarrhea. No chest pains or shortness of breath. No lower extremity edema.      Allergies   Allergen Reactions   • Codeine Nausea   • Penicillins Hives   • Sulfa Drugs Rash and Itching   • Morphine Nausea     confusion   • Trimethoprim Rash and Itching     Septra       Current medicines (including changes today)  Current Outpatient Medications   Medication Sig Dispense  Refill   • S-Adenosylmethionine (SAME) 400 MG Tab Take  by mouth.     • Misc Natural Products (OSTEO BI-FLEX ADV DOUBLE ST PO) Take 2 Tablets by mouth every day.     • CORAL CALCIUM PO Take  by mouth.     • CINNAMON PO Take  by mouth.     • Menaquinone-7 (VITAMIN K2 PO) Take  by mouth.     • CRANBERRY PO Take  by mouth.     • NON SPECIFIED 2 Tablets every day. OTC vitamin for hair     • Probiotic Product (PROBIOTIC PO) Take 2 Capsules by mouth every day.     • Non Formulary Request 2 Capsules 2 (two) times a day. Hydro Eye     • meloxicam (MOBIC) 15 MG tablet Take 15 mg by mouth every day.     • montelukast (SINGULAIR) 10 MG Tab TAKE 1 TABLET BY MOUTH ONE TIME DAILY AT BEDTIME     • NALTREXONE HCL PO naltrexone   4.5 mg @ HS     • fluticasone (FLONASE) 50 MCG/ACT nasal spray fluticasone propionate 50 mcg/actuation nasal spray,suspension     • Calcium Carbonate Antacid (BURT-MINTS) 850 MG CHEW Take 1 Tab by mouth.     • acetaminophen (TYLENOL) 500 MG TABS Take 1,000 mg by mouth every 6 hours as needed.     • cetirizine (ZYRTEC) 10 MG TABS Take 10 mg by mouth 2 times a day.       No current facility-administered medications for this visit.       Patient Active Problem List    Diagnosis Date Noted   • Osteopenia of multiple sites 12/01/2021   • Environmental and seasonal allergies 12/01/2021   • Elevated fasting glucose 12/01/2021   • Carpal tunnel syndrome 06/16/2014       Family History   Problem Relation Age of Onset   • Alcohol abuse Mother         liver issues   • Cancer Father         lung and colon   • Stroke Sister    • Hypertension Sister        She  has a past medical history of Anesthesia, Arrhythmia, Arthritis, Blood clotting disorder (HCC), Elevated blood sugar, Heart burn, Hypertension, Indigestion, Pain (06/06/14), and Unspecified cataract. She also has no past medical history of Cold or Dental disorder.  She  has a past surgical history that includes carpal tunnel release (6/16/2014); carpal tunnel  "release (7/8/2014); tubal ligation; carpal tunnel release (1973); appendectomy (1972); gyn surgery (1999); other (2011); other (Left, 1998); other (Left, Around 2010); and pr nasal scopy,rmv tiss maxill sinus (Left, 12/14/2021).       Objective:   Ht 1.753 m (5' 9\")   Wt 100 kg (221 lb)   BMI 32.64 kg/m²     Physical Exam  Constitutional: Alert, no distress, well-groomed.  Skin: No rashes in visible areas.  Eye: Round. Conjunctiva clear, lids normal. No icterus.   ENMT: Lips pink without lesions, good dentition, moist mucous membranes. Phonation normal.  Neck: No masses, no thyromegaly. Moves freely without pain.  CV: Pulse as reported by patient  Respiratory: Unlabored respiratory effort, no cough or audible wheeze  Psych: Alert and oriented x3, normal affect and mood.     [phone] 12/9/2021      Narrative & Impression  Transthoracic  Echo Report        Echocardiography Laboratory     CONCLUSIONS  No prior study is available for comparison.   The left ventricular ejection fraction is visually estimated to be 60%.  Estimated right ventricular systolic pressure is 20 mmHg.     ELENA LARSEN  Exam Date:         12/08/2021        Ref. Range 12/2/2021 16:05   WBC Latest Ref Range: 4.8 - 10.8 K/uL 9.3   RBC Latest Ref Range: 4.20 - 5.40 M/uL 4.54   Hemoglobin Latest Ref Range: 12.0 - 16.0 g/dL 13.6   Hematocrit Latest Ref Range: 37.0 - 47.0 % 41.3   MCV Latest Ref Range: 81.4 - 97.8 fL 91.0   MCH Latest Ref Range: 27.0 - 33.0 pg 30.0   MCHC Latest Ref Range: 33.6 - 35.0 g/dL 32.9 (L)   RDW Latest Ref Range: 35.9 - 50.0 fL 42.9   Platelet Count Latest Ref Range: 164 - 446 K/uL 212   MPV Latest Ref Range: 9.0 - 12.9 fL 12.1   Glycohemoglobin Latest Ref Range: 4.0 - 5.6 % 5.6   Estim. Avg Glu Latest Units: mg/dL 114       Assessment and Plan:   The following treatment plan was discussed:     This is a 76 y.o. female with IFG.     1. Elevated fasting glucose     2. Arthralgia, unspecified joint     3. Screen for " colon cancer     4. Family history of colon cancer in father     5. Immunization counseling     -Elevated fasting glucose.  Hemoglobin A1c 5.6%.  Recommend low sugar/low processed food diet and routine exercise.  Will continue to monitor.  Complete labs prior to next visit, lab orders in system.  -Arthralgia-stable.  Current medication as needed.  -Screening for colon cancer-patient plans to complete Cologuard, order per Dr. Herman.  -Immunization counseling- reviewed recommendations for immunizations.    Follow-up: Return in about 5 months (around 6/14/2022).   Patient to complete lab work prior to follow up visit.          This medical record contains text that has been entered with the assistance of computer voice recognition and dictation software.  Therefore, it may contain unintended errors in text, spelling, punctuation, or grammar.

## 2022-03-07 ENCOUNTER — HOSPITAL ENCOUNTER (OUTPATIENT)
Dept: RADIOLOGY | Facility: MEDICAL CENTER | Age: 77
End: 2022-03-07
Attending: FAMILY MEDICINE
Payer: MEDICARE

## 2022-03-07 DIAGNOSIS — M23.42 LOOSE BODY OF LEFT KNEE: ICD-10-CM

## 2022-03-07 DIAGNOSIS — M23.312 MEDIAL MENISCUS, ANTERIOR HORN DERANGEMENT, LEFT: ICD-10-CM

## 2022-03-07 PROCEDURE — 73721 MRI JNT OF LWR EXTRE W/O DYE: CPT | Mod: LT

## 2022-05-06 PROBLEM — M17.12 ARTHRITIS OF LEFT KNEE: Status: ACTIVE | Noted: 2022-05-06

## 2022-05-10 ASSESSMENT — FIBROSIS 4 INDEX: FIB4 SCORE: 1.98

## 2022-06-08 ENCOUNTER — PRE-ADMISSION TESTING (OUTPATIENT)
Dept: ADMISSIONS | Facility: MEDICAL CENTER | Age: 77
End: 2022-06-08
Attending: ORTHOPAEDIC SURGERY
Payer: MEDICARE

## 2022-06-08 DIAGNOSIS — Z01.818 PRE-OP EXAMINATION: ICD-10-CM

## 2022-06-08 LAB
ANION GAP SERPL CALC-SCNC: 12 MMOL/L (ref 7–16)
BUN SERPL-MCNC: 18 MG/DL (ref 8–22)
CALCIUM SERPL-MCNC: 10.1 MG/DL (ref 8.4–10.2)
CHLORIDE SERPL-SCNC: 103 MMOL/L (ref 96–112)
CO2 SERPL-SCNC: 25 MMOL/L (ref 20–33)
CREAT SERPL-MCNC: 0.77 MG/DL (ref 0.5–1.4)
ERYTHROCYTE [DISTWIDTH] IN BLOOD BY AUTOMATED COUNT: 42.2 FL (ref 35.9–50)
GFR SERPLBLD CREATININE-BSD FMLA CKD-EPI: 80 ML/MIN/1.73 M 2
GLUCOSE SERPL-MCNC: 102 MG/DL (ref 65–99)
HCT VFR BLD AUTO: 43.8 % (ref 37–47)
HGB BLD-MCNC: 14.6 G/DL (ref 12–16)
MAGNESIUM SERPL-MCNC: 2.1 MG/DL (ref 1.5–2.5)
MCH RBC QN AUTO: 30.3 PG (ref 27–33)
MCHC RBC AUTO-ENTMCNC: 33.3 G/DL (ref 33.6–35)
MCV RBC AUTO: 90.9 FL (ref 81.4–97.8)
PLATELET # BLD AUTO: 244 K/UL (ref 164–446)
PMV BLD AUTO: 12.2 FL (ref 9–12.9)
POTASSIUM SERPL-SCNC: 4.6 MMOL/L (ref 3.6–5.5)
RBC # BLD AUTO: 4.82 M/UL (ref 4.2–5.4)
SCCMEC + MECA PNL NOSE NAA+PROBE: NEGATIVE
SCCMEC + MECA PNL NOSE NAA+PROBE: NEGATIVE
SODIUM SERPL-SCNC: 140 MMOL/L (ref 135–145)
WBC # BLD AUTO: 7.7 K/UL (ref 4.8–10.8)

## 2022-06-08 PROCEDURE — 80048 BASIC METABOLIC PNL TOTAL CA: CPT

## 2022-06-08 PROCEDURE — 36415 COLL VENOUS BLD VENIPUNCTURE: CPT

## 2022-06-08 PROCEDURE — 93005 ELECTROCARDIOGRAM TRACING: CPT

## 2022-06-08 PROCEDURE — 87640 STAPH A DNA AMP PROBE: CPT | Mod: XU

## 2022-06-08 PROCEDURE — 83735 ASSAY OF MAGNESIUM: CPT

## 2022-06-08 PROCEDURE — 87641 MR-STAPH DNA AMP PROBE: CPT

## 2022-06-08 PROCEDURE — 85027 COMPLETE CBC AUTOMATED: CPT

## 2022-06-08 RX ORDER — OMEGA-3/DHA/EPA/FISH OIL 300-1000MG
CAPSULE ORAL
COMMUNITY

## 2022-06-08 ASSESSMENT — FIBROSIS 4 INDEX: FIB4 SCORE: 1.98

## 2022-06-08 NOTE — DISCHARGE PLANNING
DISCHARGE PLANNING NOTE - TOTAL JOINT    Procedure: Procedure(s):  ARTHROPLASTY, KNEE, TOTAL  Procedure Date: 6/27/2022  Insurance: Payor: [facility] SENIOR CARE PLUS / Plan: [facility] [place] PREFERRED 21  / Product Type: Medicare Advantage /    Equipment currently available at home?  pt is going to get a tub transfer bench, shower chair, and a toilet seat riser.  Steps into the home? 1  Steps within the home? 1 flight at her daughters, none at her house.  Toilet height? Standard  Type of shower? walk in at her daughters, step over tub at her house.  Who will be with you during your recovery? daughter  Is Outpatient Physical Therapy set up after surgery? No arrangements in process.  Did you take the Total Joint Class and where? Yes received NAON book today.  Planning same day discharge?Yes     This writer met with pt during her preadmission appt. She will be staying with her daughter, Nicol, for a while. Niclo is a P.T.  Home safety checklist reviewed and copy given to pt. Pt educated to dc criteria. All questions answered and verbalizes understanding of all instructions. No dc needs identified at this time. Anticipate dc to home without barriers.

## 2022-06-08 NOTE — PREPROCEDURE INSTRUCTIONS
Pre-admit appointment completed. Pt states all instructions given are understood. Medications the patient will take the morning of surgery per anesthesia protocol: None. Instructed to take prescribed medications through the day before surgery.        Surgery preparation checklist for Joint Replacement Program given to Pt with verbal instructions.

## 2022-06-09 LAB — EKG IMPRESSION: NORMAL

## 2022-06-09 PROCEDURE — 93010 ELECTROCARDIOGRAM REPORT: CPT | Performed by: INTERNAL MEDICINE

## 2022-06-10 NOTE — OP THERAPY EVALUATION
"  Outpatient Physical Therapy  WHEELCHAIR EVALUATION    [address]  [address]  [place] [address]  Phone:  [phone]  Fax:  [phone]    Date of Evaluation: 07/05/2022    Patient Name: Giselle Ramon  YOB: 1945  MRN: [id number]   Height: 1.753 m (5' 9\")   Weight: 102 kg (225 lb 5 oz)       Referring Provider: Nj Ortiz M.D.  [address] [place] [address]   Referring Diagnosis No admission diagnoses are documented for this encounter.     Time Calculation                       Equipment recommendations   Type/Model     Recommendation: scooter    Justification: Can functional mobility/ADL deficit be resolved with a cane, walker, or optimally configured manual wheelchair?  No, *** is unable to stand or ambulate with any assistive device. He does not have sufficient upper or lower extremity ROM or strength to self-propel either a standard or an optimally configured manual wheelchair. He is unable to transfer safely onto a POV, or does not have room in his/her home for the turning radius of a POV.   Foot Rests/Foot Plates:    Justification: ADJUSTABLE ANGLE FOOTPLATES:  *** requires adjustable angle footrests to provide foot support and to accommodate weakness and tightness in his/her ankles. GROUP II POWER WHEELCHAIR      ***      would benefit from a Group II power wheelchair with a captain’s seat and the following components to enable him/her to perform meal prep, ADL’s, and safe mobility in his/her home. She/He is unable to utilize a scooter due to the inability to safely transfer into the scooter and lack of upper extremity ROM to steer a scooter.  She/He requires a power wheelchair with a small turning radius to allow him/her mobility throughout her home.  Without the use of a power wheelchair,   ***    will be severely limited with mobility throughout his/her home and will continue to be at risk for falls.PRESSURE RELIEVING SEAT " CUSHION:    *** requires a pressure relieving cushion since he is unable to perform an independent pressure relief.    It is my recommendation that *** receive a power scooter for use in his/her home.  The use of this equipment will improve his/ herindependence, efficiency, and safety for mobility and ADLs in the home. The use of this equipment is considered a required lifetime medical need. It is considered medically necessary and will contribute to cost containment in the future.  It is my recommendation that *** receive a power scooter for use in his/her home.  The use of this equipment will improve his/ herindependence, efficiency, and safety for mobility and ADLs in the home. The use of this equipment is considered a required lifetime medical need. It is considered medically necessary and will contribute to cost containment in the future.    Electronic Components:    Recommendation: swing away joystick with mounting hardware      Functional Assessment Used        Electronically signed by Kajal Holguin PT on 6/10/2022    Referring provider co-signature:  I have reviewed this evaluation and recommendation(s) and my co-signature certifies my agreement with the contents.      Physician Signature: ________________________________ Date: ______________

## 2022-06-23 NOTE — H&P (VIEW-ONLY)
"    Giselle Ramon is a 76 y.o. female  here today for further discussion of their left knee arthritis and left knee pain.  They have tried and failed most all conservative measures and are ready to move forward with surgery.  They have obtained the appropriate clearances if necessary.  They have pain on a daily basis, pain at night, pain that affects her activities of daily living.  They have no history of blood clots.  They are ready to move forward with surgery.  We are planning on using [employer] triathlon cementless as their implants.  They will be having their surgery at [facility].      Past Medical History:   Diagnosis Date   • Anesthesia     PONV   • Arrhythmia     Arrythmia   • Arthritis     Knees, thumbs, back   • Blood clotting disorder (HCC)     Left leg X 3- superficial, no DVT's    • Elevated blood sugar     Per patient: 6/2021 during a check up with MD.    • Heart burn    • Hypertension     \"Borderline\"    • Indigestion    • Pain 06/06/2014    both wrists hurt with activity-numb   • PONV (postoperative nausea and vomiting)    • Unspecified cataract     Bilateral IOL's     Past Surgical History:   Procedure Laterality Date   • OK NASAL SCOPY,RMV TISS MAXILL SINUS Left 12/14/2021    Procedure: ENDOSCOPY, PARANASAL SINUS, WITH TOTAL ETHMOIDECTOMY, SPHENOIDOTOMY, MAXILLARY ANTROSTOMY, SPHEN+MAX SIN TISS REM, AND EXPLORATION FRONT SIN - ANTERIOR ETHMOIDECTOMY;  Surgeon: Yobany Lugo M.D.;  Location: SURGERY SAME DAY [facility];  Service: Ent   • CARPAL TUNNEL RELEASE  7/8/2014    Performed by Herson Hedrick M.D. at SURGERY [facility]   • CARPAL TUNNEL RELEASE  6/16/2014    Performed by Herson Hedrick M.D. at SURGERY [facility]   • OTHER  2011    Bilateral IOL's   • GYN SURGERY  1999    Hysterectomy   • OTHER Left 1998    Vein ligation-  Leftleg   • CARPAL TUNNEL RELEASE  1973   • APPENDECTOMY  1972   • OTHER Left Around 2010    Vein ablasion left leg   • TUBAL LIGATION   "     Social Connections: Not on file       Current Outpatient Medications:   •  docosahexanoic acid,   •  Cyanocobalamin (VITAMIN B-12 PO), Take  by mouth every day.  •  Coenzyme Q10 (COQ10 PO), Take  by mouth every day.  •  MAGNESIUM PO, Take  by mouth every day.  •  CALCIUM PO, Take  by mouth every day.  •  Cholecalciferol (VITAMIN D3 PO), Take  by mouth every day.  •  BIOTIN PO, Take  by mouth every day.  •  SAMe, Take  by mouth.  •  Misc Natural Products (OSTEO BI-FLEX ADV DOUBLE ST PO), 2 Tablet, Oral, DAILY  •  CORAL CALCIUM PO, Take  by mouth.  •  CINNAMON PO, Take  by mouth.  •  Menaquinone-7 (VITAMIN K2 PO), Take  by mouth.  •  CRANBERRY PO, Take  by mouth.  •  NON SPECIFIED, 2 Tablet, DAILY  •  Probiotic Product (PROBIOTIC PO), 2 Capsule, Oral, DAILY  •  Non Formulary Request, 2 Capsule, BID  •  meloxicam, 15 mg, Oral, DAILY  •  montelukast, TAKE 1 TABLET BY MOUTH ONE TIME DAILY AT BEDTIME  •  NALTREXONE HCL PO, naltrexone  4.5 mg @ HS  •  fluticasone, fluticasone propionate 50 mcg/actuation nasal spray,suspension  •  Calcium Carbonate Antacid, Take 1 Tab by mouth.  •  acetaminophen, 1,000 mg, Oral, Q6HRS PRN  •  cetirizine, 10 mg, Oral, BID  Codeine, Penicillins, Sulfa drugs, Morphine, and Trimethoprim      Past medical history social history surgical history review of systems reviewed and is otherwise noncontributory except for above    Exam    Alert and oriented x3, no apparent distress  Normocephalic atraumatic, trachea midline  Breathing is nonlabored  Abdomen is nonacute  Neurovascularly intact in bilateral lower extremities.  Soft Calf Compartments without signs of DVT.  Right lower extremity: Good mechanical alignment of the right knee.  Only minimal pain with forced flexion, range of motion from 10 to 115 degrees.  She is stable to varus and valgus stress, some pain with patellofemoral grind.  Able to plantar and dorsiflex her ankles and extend her great toes.  Her sensation is intact to light  touch in an L4-L5 and S1 distribution.  Her toes are warm and well-perfused  Left lower extremity: Her left knee has a valgus deformity that does not fully correct to neutral.  She has pain along the course of her MCL.  She has pain with patellofemoral grind and palpation of her tibiofemoral joint line.  Her range of motion is 10 to 110 degrees and forced flexion causes pain that does recreate her chief complaint.  She is able to plantar and dorsiflex her ankles and extend her great toes.  Her sensation is intact to light touch in an L4-L5 and S1 distribution.  Her toes are warm and well-perfused      Images    All images were reviewed which are appropriate for templating purposes.  They show evidence of left knee arthritis with a valgus deformity, joint space narrowing, osteophyte formation, subchondral sclerosis.    Impression    1) left knee pain  2) left knee arthritis    Plan    All the risk benefits and side effects of surgery were discussed including pain, bleeding, infection, injury to surrounding organs vessels, heart attack, stroke, fracture, dislocation, need for further surgery, and possibly death.  We will plan on using aspirin for DVT prophylaxis.  Patient will get all of their postoperative prescriptions today.  Patient will plan on going home same day.  We will plan on using [employer] triathlon cementless as their surgical implants.  All of their questions were answered and they agreed expressed full understanding.  We will see them on the day of surgery.  They will get a call from our surgical team about start time and arrival time of surgery.

## 2022-06-25 ENCOUNTER — ANESTHESIA EVENT (OUTPATIENT)
Dept: SURGERY | Facility: MEDICAL CENTER | Age: 77
End: 2022-06-25
Payer: MEDICARE

## 2022-06-27 ENCOUNTER — ANESTHESIA (OUTPATIENT)
Dept: SURGERY | Facility: MEDICAL CENTER | Age: 77
End: 2022-06-27
Payer: MEDICARE

## 2022-06-27 ENCOUNTER — APPOINTMENT (OUTPATIENT)
Dept: RADIOLOGY | Facility: MEDICAL CENTER | Age: 77
End: 2022-06-27
Attending: PHYSICIAN ASSISTANT
Payer: MEDICARE

## 2022-06-27 ENCOUNTER — HOSPITAL ENCOUNTER (OUTPATIENT)
Facility: MEDICAL CENTER | Age: 77
End: 2022-06-27
Attending: ORTHOPAEDIC SURGERY | Admitting: ORTHOPAEDIC SURGERY
Payer: MEDICARE

## 2022-06-27 VITALS
HEART RATE: 103 BPM | TEMPERATURE: 97.5 F | SYSTOLIC BLOOD PRESSURE: 128 MMHG | HEIGHT: 69 IN | OXYGEN SATURATION: 96 % | DIASTOLIC BLOOD PRESSURE: 68 MMHG | WEIGHT: 229.28 LBS | BODY MASS INDEX: 33.96 KG/M2 | RESPIRATION RATE: 18 BRPM

## 2022-06-27 DIAGNOSIS — M17.12 ARTHRITIS OF LEFT KNEE: ICD-10-CM

## 2022-06-27 PROCEDURE — 27447 TOTAL KNEE ARTHROPLASTY: CPT | Mod: LT | Performed by: ORTHOPAEDIC SURGERY

## 2022-06-27 PROCEDURE — 700101 HCHG RX REV CODE 250: Performed by: ANESTHESIOLOGY

## 2022-06-27 PROCEDURE — 64447 NJX AA&/STRD FEMORAL NRV IMG: CPT | Performed by: ORTHOPAEDIC SURGERY

## 2022-06-27 PROCEDURE — 73560 X-RAY EXAM OF KNEE 1 OR 2: CPT | Mod: LT

## 2022-06-27 PROCEDURE — A9270 NON-COVERED ITEM OR SERVICE: HCPCS | Performed by: PHYSICIAN ASSISTANT

## 2022-06-27 PROCEDURE — 97110 THERAPEUTIC EXERCISES: CPT

## 2022-06-27 PROCEDURE — 160009 HCHG ANES TIME/MIN: Performed by: ORTHOPAEDIC SURGERY

## 2022-06-27 PROCEDURE — 502240 HCHG MISC OR SUPPLY RC 0272: Performed by: ORTHOPAEDIC SURGERY

## 2022-06-27 PROCEDURE — 76942 ECHO GUIDE FOR BIOPSY: CPT | Mod: 26 | Performed by: ANESTHESIOLOGY

## 2022-06-27 PROCEDURE — 700105 HCHG RX REV CODE 258: Performed by: ANESTHESIOLOGY

## 2022-06-27 PROCEDURE — 502000 HCHG MISC OR IMPLANTS RC 0278: Performed by: ORTHOPAEDIC SURGERY

## 2022-06-27 PROCEDURE — 700111 HCHG RX REV CODE 636 W/ 250 OVERRIDE (IP): Performed by: PHYSICIAN ASSISTANT

## 2022-06-27 PROCEDURE — 64447 NJX AA&/STRD FEMORAL NRV IMG: CPT | Mod: 59 | Performed by: ANESTHESIOLOGY

## 2022-06-27 PROCEDURE — 700111 HCHG RX REV CODE 636 W/ 250 OVERRIDE (IP): Performed by: ANESTHESIOLOGY

## 2022-06-27 PROCEDURE — 160002 HCHG RECOVERY MINUTES (STAT): Performed by: ORTHOPAEDIC SURGERY

## 2022-06-27 PROCEDURE — 96365 THER/PROPH/DIAG IV INF INIT: CPT

## 2022-06-27 PROCEDURE — 99100 ANES PT EXTEME AGE<1 YR&>70: CPT | Performed by: ANESTHESIOLOGY

## 2022-06-27 PROCEDURE — 97161 PT EVAL LOW COMPLEX 20 MIN: CPT

## 2022-06-27 PROCEDURE — C1776 JOINT DEVICE (IMPLANTABLE): HCPCS | Performed by: ORTHOPAEDIC SURGERY

## 2022-06-27 PROCEDURE — 700105 HCHG RX REV CODE 258: Performed by: PHYSICIAN ASSISTANT

## 2022-06-27 PROCEDURE — 160041 HCHG SURGERY MINUTES - EA ADDL 1 MIN LEVEL 4: Performed by: ORTHOPAEDIC SURGERY

## 2022-06-27 PROCEDURE — 700102 HCHG RX REV CODE 250 W/ 637 OVERRIDE(OP): Performed by: PHYSICIAN ASSISTANT

## 2022-06-27 PROCEDURE — 97165 OT EVAL LOW COMPLEX 30 MIN: CPT

## 2022-06-27 PROCEDURE — 160048 HCHG OR STATISTICAL LEVEL 1-5: Performed by: ORTHOPAEDIC SURGERY

## 2022-06-27 PROCEDURE — 700101 HCHG RX REV CODE 250: Performed by: ORTHOPAEDIC SURGERY

## 2022-06-27 PROCEDURE — 97116 GAIT TRAINING THERAPY: CPT

## 2022-06-27 PROCEDURE — 700105 HCHG RX REV CODE 258: Performed by: ORTHOPAEDIC SURGERY

## 2022-06-27 PROCEDURE — 97607 NEG PRS WND THR NDME<=50SQCM: CPT | Performed by: ORTHOPAEDIC SURGERY

## 2022-06-27 PROCEDURE — 160035 HCHG PACU - 1ST 60 MINS PHASE I: Performed by: ORTHOPAEDIC SURGERY

## 2022-06-27 PROCEDURE — 160036 HCHG PACU - EA ADDL 30 MINS PHASE I: Performed by: ORTHOPAEDIC SURGERY

## 2022-06-27 PROCEDURE — 160029 HCHG SURGERY MINUTES - 1ST 30 MINS LEVEL 4: Performed by: ORTHOPAEDIC SURGERY

## 2022-06-27 PROCEDURE — 01402 ANES OPN/ARTH TOT KNE ARTHRP: CPT | Performed by: ANESTHESIOLOGY

## 2022-06-27 PROCEDURE — 27447 TOTAL KNEE ARTHROPLASTY: CPT | Mod: ASROC,LT | Performed by: PHYSICIAN ASSISTANT

## 2022-06-27 PROCEDURE — G0378 HOSPITAL OBSERVATION PER HR: HCPCS

## 2022-06-27 PROCEDURE — 700111 HCHG RX REV CODE 636 W/ 250 OVERRIDE (IP): Performed by: ORTHOPAEDIC SURGERY

## 2022-06-27 PROCEDURE — 97607 NEG PRS WND THR NDME<=50SQCM: CPT | Mod: ASROC | Performed by: PHYSICIAN ASSISTANT

## 2022-06-27 DEVICE — IMPLANTABLE DEVICE: Type: IMPLANTABLE DEVICE | Site: KNEE | Status: FUNCTIONAL

## 2022-06-27 RX ORDER — SODIUM CHLORIDE, SODIUM LACTATE, POTASSIUM CHLORIDE, CALCIUM CHLORIDE 600; 310; 30; 20 MG/100ML; MG/100ML; MG/100ML; MG/100ML
INJECTION, SOLUTION INTRAVENOUS CONTINUOUS
Status: DISCONTINUED | OUTPATIENT
Start: 2022-06-27 | End: 2022-06-27 | Stop reason: HOSPADM

## 2022-06-27 RX ORDER — DOCUSATE SODIUM 100 MG/1
100 CAPSULE, LIQUID FILLED ORAL 2 TIMES DAILY
Status: DISCONTINUED | OUTPATIENT
Start: 2022-06-27 | End: 2022-06-27 | Stop reason: HOSPADM

## 2022-06-27 RX ORDER — HYDROMORPHONE HYDROCHLORIDE 1 MG/ML
0.4 INJECTION, SOLUTION INTRAMUSCULAR; INTRAVENOUS; SUBCUTANEOUS
Status: DISCONTINUED | OUTPATIENT
Start: 2022-06-27 | End: 2022-06-27 | Stop reason: HOSPADM

## 2022-06-27 RX ORDER — OXYCODONE HYDROCHLORIDE 5 MG/1
5 TABLET ORAL
Status: DISCONTINUED | OUTPATIENT
Start: 2022-06-27 | End: 2022-06-27 | Stop reason: HOSPADM

## 2022-06-27 RX ORDER — ACETAMINOPHEN 325 MG/1
650 TABLET ORAL EVERY 6 HOURS PRN
Status: DISCONTINUED | OUTPATIENT
Start: 2022-07-02 | End: 2022-06-27 | Stop reason: HOSPADM

## 2022-06-27 RX ORDER — SODIUM CHLORIDE, SODIUM LACTATE, POTASSIUM CHLORIDE, CALCIUM CHLORIDE 600; 310; 30; 20 MG/100ML; MG/100ML; MG/100ML; MG/100ML
INJECTION, SOLUTION INTRAVENOUS CONTINUOUS
Status: ACTIVE | OUTPATIENT
Start: 2022-06-27 | End: 2022-06-27

## 2022-06-27 RX ORDER — DIPHENHYDRAMINE HCL 25 MG
25 TABLET ORAL NIGHTLY PRN
Status: DISCONTINUED | OUTPATIENT
Start: 2022-06-28 | End: 2022-06-27 | Stop reason: HOSPADM

## 2022-06-27 RX ORDER — TRANEXAMIC ACID 100 MG/ML
2000 INJECTION, SOLUTION INTRAVENOUS ONCE
Status: COMPLETED | OUTPATIENT
Start: 2022-06-27 | End: 2022-06-27

## 2022-06-27 RX ORDER — ALBUTEROL SULFATE 2.5 MG/3ML
2.5 SOLUTION RESPIRATORY (INHALATION)
Status: DISCONTINUED | OUTPATIENT
Start: 2022-06-27 | End: 2022-06-27 | Stop reason: HOSPADM

## 2022-06-27 RX ORDER — DIPHENHYDRAMINE HCL 25 MG
25 TABLET ORAL EVERY 6 HOURS PRN
Status: DISCONTINUED | OUTPATIENT
Start: 2022-06-27 | End: 2022-06-27 | Stop reason: HOSPADM

## 2022-06-27 RX ORDER — METOPROLOL TARTRATE 1 MG/ML
1 INJECTION, SOLUTION INTRAVENOUS
Status: DISCONTINUED | OUTPATIENT
Start: 2022-06-27 | End: 2022-06-27 | Stop reason: HOSPADM

## 2022-06-27 RX ORDER — KETOROLAC TROMETHAMINE 30 MG/ML
INJECTION, SOLUTION INTRAMUSCULAR; INTRAVENOUS
Status: DISCONTINUED | OUTPATIENT
Start: 2022-06-27 | End: 2022-06-27 | Stop reason: HOSPADM

## 2022-06-27 RX ORDER — DEXAMETHASONE SODIUM PHOSPHATE 4 MG/ML
4 INJECTION, SOLUTION INTRA-ARTICULAR; INTRALESIONAL; INTRAMUSCULAR; INTRAVENOUS; SOFT TISSUE
Status: DISCONTINUED | OUTPATIENT
Start: 2022-06-27 | End: 2022-06-27 | Stop reason: HOSPADM

## 2022-06-27 RX ORDER — LIDOCAINE HYDROCHLORIDE 20 MG/ML
INJECTION, SOLUTION EPIDURAL; INFILTRATION; INTRACAUDAL; PERINEURAL PRN
Status: DISCONTINUED | OUTPATIENT
Start: 2022-06-27 | End: 2022-06-27 | Stop reason: SURG

## 2022-06-27 RX ORDER — HYDROMORPHONE HYDROCHLORIDE 2 MG/ML
INJECTION, SOLUTION INTRAMUSCULAR; INTRAVENOUS; SUBCUTANEOUS PRN
Status: DISCONTINUED | OUTPATIENT
Start: 2022-06-27 | End: 2022-06-27 | Stop reason: SURG

## 2022-06-27 RX ORDER — HALOPERIDOL 5 MG/ML
1 INJECTION INTRAMUSCULAR
Status: DISCONTINUED | OUTPATIENT
Start: 2022-06-27 | End: 2022-06-27 | Stop reason: HOSPADM

## 2022-06-27 RX ORDER — DEXAMETHASONE SODIUM PHOSPHATE 4 MG/ML
10 INJECTION, SOLUTION INTRA-ARTICULAR; INTRALESIONAL; INTRAMUSCULAR; INTRAVENOUS; SOFT TISSUE ONCE
Status: DISCONTINUED | OUTPATIENT
Start: 2022-06-28 | End: 2022-06-27 | Stop reason: HOSPADM

## 2022-06-27 RX ORDER — ROPIVACAINE HYDROCHLORIDE 5 MG/ML
INJECTION, SOLUTION EPIDURAL; INFILTRATION; PERINEURAL PRN
Status: DISCONTINUED | OUTPATIENT
Start: 2022-06-27 | End: 2022-06-27 | Stop reason: SURG

## 2022-06-27 RX ORDER — OXYCODONE HYDROCHLORIDE 10 MG/1
10 TABLET ORAL
Status: DISCONTINUED | OUTPATIENT
Start: 2022-06-27 | End: 2022-06-27 | Stop reason: HOSPADM

## 2022-06-27 RX ORDER — MAGNESIUM SULFATE HEPTAHYDRATE 40 MG/ML
INJECTION, SOLUTION INTRAVENOUS PRN
Status: DISCONTINUED | OUTPATIENT
Start: 2022-06-27 | End: 2022-06-27 | Stop reason: SURG

## 2022-06-27 RX ORDER — ONDANSETRON 2 MG/ML
4 INJECTION INTRAMUSCULAR; INTRAVENOUS EVERY 4 HOURS PRN
Status: DISCONTINUED | OUTPATIENT
Start: 2022-06-27 | End: 2022-06-27 | Stop reason: HOSPADM

## 2022-06-27 RX ORDER — LIDOCAINE HYDROCHLORIDE 10 MG/ML
INJECTION, SOLUTION EPIDURAL; INFILTRATION; INTRACAUDAL; PERINEURAL
Status: DISCONTINUED
Start: 2022-06-27 | End: 2022-06-27 | Stop reason: HOSPADM

## 2022-06-27 RX ORDER — HYDRALAZINE HYDROCHLORIDE 20 MG/ML
5 INJECTION INTRAMUSCULAR; INTRAVENOUS
Status: DISCONTINUED | OUTPATIENT
Start: 2022-06-27 | End: 2022-06-27 | Stop reason: HOSPADM

## 2022-06-27 RX ORDER — SCOLOPAMINE TRANSDERMAL SYSTEM 1 MG/1
1 PATCH, EXTENDED RELEASE TRANSDERMAL
Status: DISCONTINUED | OUTPATIENT
Start: 2022-06-27 | End: 2022-06-27 | Stop reason: HOSPADM

## 2022-06-27 RX ORDER — ONDANSETRON 4 MG/1
4 TABLET, ORALLY DISINTEGRATING ORAL EVERY 4 HOURS PRN
Status: DISCONTINUED | OUTPATIENT
Start: 2022-06-27 | End: 2022-06-27 | Stop reason: HOSPADM

## 2022-06-27 RX ORDER — SODIUM CHLORIDE 9 MG/ML
INJECTION, SOLUTION INTRAMUSCULAR; INTRAVENOUS; SUBCUTANEOUS
Status: DISCONTINUED | OUTPATIENT
Start: 2022-06-27 | End: 2022-06-27 | Stop reason: HOSPADM

## 2022-06-27 RX ORDER — AMOXICILLIN 250 MG
1 CAPSULE ORAL NIGHTLY
Status: DISCONTINUED | OUTPATIENT
Start: 2022-06-27 | End: 2022-06-27 | Stop reason: HOSPADM

## 2022-06-27 RX ORDER — HYDROMORPHONE HYDROCHLORIDE 1 MG/ML
0.2 INJECTION, SOLUTION INTRAMUSCULAR; INTRAVENOUS; SUBCUTANEOUS
Status: DISCONTINUED | OUTPATIENT
Start: 2022-06-27 | End: 2022-06-27 | Stop reason: HOSPADM

## 2022-06-27 RX ORDER — OMEPRAZOLE 20 MG/1
20 CAPSULE, DELAYED RELEASE ORAL 2 TIMES DAILY PRN
Status: DISCONTINUED | OUTPATIENT
Start: 2022-06-27 | End: 2022-06-27 | Stop reason: HOSPADM

## 2022-06-27 RX ORDER — TAMSULOSIN HYDROCHLORIDE 0.4 MG/1
0.4 CAPSULE ORAL
Status: DISCONTINUED | OUTPATIENT
Start: 2022-06-27 | End: 2022-06-27 | Stop reason: HOSPADM

## 2022-06-27 RX ORDER — DEXAMETHASONE SODIUM PHOSPHATE 4 MG/ML
INJECTION, SOLUTION INTRA-ARTICULAR; INTRALESIONAL; INTRAMUSCULAR; INTRAVENOUS; SOFT TISSUE PRN
Status: DISCONTINUED | OUTPATIENT
Start: 2022-06-27 | End: 2022-06-27 | Stop reason: SURG

## 2022-06-27 RX ORDER — ONDANSETRON 2 MG/ML
INJECTION INTRAMUSCULAR; INTRAVENOUS PRN
Status: DISCONTINUED | OUTPATIENT
Start: 2022-06-27 | End: 2022-06-27 | Stop reason: SURG

## 2022-06-27 RX ORDER — OXYCODONE HCL 5 MG/5 ML
10 SOLUTION, ORAL ORAL
Status: DISCONTINUED | OUTPATIENT
Start: 2022-06-27 | End: 2022-06-27 | Stop reason: HOSPADM

## 2022-06-27 RX ORDER — EPINEPHRINE 1 MG/ML(1)
AMPUL (ML) INJECTION
Status: DISCONTINUED | OUTPATIENT
Start: 2022-06-27 | End: 2022-06-27 | Stop reason: HOSPADM

## 2022-06-27 RX ORDER — ONDANSETRON 2 MG/ML
4 INJECTION INTRAMUSCULAR; INTRAVENOUS
Status: DISCONTINUED | OUTPATIENT
Start: 2022-06-27 | End: 2022-06-27 | Stop reason: HOSPADM

## 2022-06-27 RX ORDER — SODIUM CHLORIDE, SODIUM LACTATE, POTASSIUM CHLORIDE, CALCIUM CHLORIDE 600; 310; 30; 20 MG/100ML; MG/100ML; MG/100ML; MG/100ML
INJECTION, SOLUTION INTRAVENOUS
Status: DISCONTINUED | OUTPATIENT
Start: 2022-06-27 | End: 2022-06-27 | Stop reason: SURG

## 2022-06-27 RX ORDER — AMOXICILLIN 250 MG
1 CAPSULE ORAL
Status: DISCONTINUED | OUTPATIENT
Start: 2022-06-27 | End: 2022-06-27 | Stop reason: HOSPADM

## 2022-06-27 RX ORDER — DIPHENHYDRAMINE HYDROCHLORIDE 50 MG/ML
25 INJECTION INTRAMUSCULAR; INTRAVENOUS EVERY 6 HOURS PRN
Status: DISCONTINUED | OUTPATIENT
Start: 2022-06-27 | End: 2022-06-27 | Stop reason: HOSPADM

## 2022-06-27 RX ORDER — HALOPERIDOL 5 MG/ML
1 INJECTION INTRAMUSCULAR EVERY 6 HOURS PRN
Status: DISCONTINUED | OUTPATIENT
Start: 2022-06-27 | End: 2022-06-27 | Stop reason: HOSPADM

## 2022-06-27 RX ORDER — CEFAZOLIN SODIUM 1 G/3ML
2 INJECTION, POWDER, FOR SOLUTION INTRAMUSCULAR; INTRAVENOUS ONCE
Status: DISCONTINUED | OUTPATIENT
Start: 2022-06-27 | End: 2022-06-27 | Stop reason: HOSPADM

## 2022-06-27 RX ORDER — TRAMADOL HYDROCHLORIDE 50 MG/1
50 TABLET ORAL EVERY 4 HOURS PRN
Status: DISCONTINUED | OUTPATIENT
Start: 2022-06-27 | End: 2022-06-27 | Stop reason: HOSPADM

## 2022-06-27 RX ORDER — ENEMA 19; 7 G/133ML; G/133ML
1 ENEMA RECTAL
Status: DISCONTINUED | OUTPATIENT
Start: 2022-06-27 | End: 2022-06-27 | Stop reason: HOSPADM

## 2022-06-27 RX ORDER — BISACODYL 10 MG
10 SUPPOSITORY, RECTAL RECTAL
Status: DISCONTINUED | OUTPATIENT
Start: 2022-06-27 | End: 2022-06-27 | Stop reason: HOSPADM

## 2022-06-27 RX ORDER — MONTELUKAST SODIUM 10 MG/1
5 TABLET ORAL NIGHTLY
Status: DISCONTINUED | OUTPATIENT
Start: 2022-06-27 | End: 2022-06-27 | Stop reason: HOSPADM

## 2022-06-27 RX ORDER — POLYETHYLENE GLYCOL 3350 17 G/17G
1 POWDER, FOR SOLUTION ORAL 2 TIMES DAILY PRN
Status: DISCONTINUED | OUTPATIENT
Start: 2022-06-27 | End: 2022-06-27 | Stop reason: HOSPADM

## 2022-06-27 RX ORDER — IBUPROFEN 400 MG/1
800 TABLET ORAL 3 TIMES DAILY PRN
Status: DISCONTINUED | OUTPATIENT
Start: 2022-06-30 | End: 2022-06-27 | Stop reason: HOSPADM

## 2022-06-27 RX ORDER — KETOROLAC TROMETHAMINE 30 MG/ML
15 INJECTION, SOLUTION INTRAMUSCULAR; INTRAVENOUS EVERY 6 HOURS
Status: DISCONTINUED | OUTPATIENT
Start: 2022-06-27 | End: 2022-06-27 | Stop reason: HOSPADM

## 2022-06-27 RX ORDER — HYDROMORPHONE HYDROCHLORIDE 1 MG/ML
0.5 INJECTION, SOLUTION INTRAMUSCULAR; INTRAVENOUS; SUBCUTANEOUS
Status: DISCONTINUED | OUTPATIENT
Start: 2022-06-27 | End: 2022-06-27 | Stop reason: HOSPADM

## 2022-06-27 RX ORDER — OXYCODONE HCL 5 MG/5 ML
5 SOLUTION, ORAL ORAL
Status: DISCONTINUED | OUTPATIENT
Start: 2022-06-27 | End: 2022-06-27 | Stop reason: HOSPADM

## 2022-06-27 RX ORDER — DEXTROSE MONOHYDRATE, SODIUM CHLORIDE, AND POTASSIUM CHLORIDE 50; 1.49; 4.5 G/1000ML; G/1000ML; G/1000ML
INJECTION, SOLUTION INTRAVENOUS CONTINUOUS
Status: DISCONTINUED | OUTPATIENT
Start: 2022-06-27 | End: 2022-06-27 | Stop reason: HOSPADM

## 2022-06-27 RX ORDER — HYDROMORPHONE HYDROCHLORIDE 1 MG/ML
0.1 INJECTION, SOLUTION INTRAMUSCULAR; INTRAVENOUS; SUBCUTANEOUS
Status: DISCONTINUED | OUTPATIENT
Start: 2022-06-27 | End: 2022-06-27 | Stop reason: HOSPADM

## 2022-06-27 RX ORDER — ACETAMINOPHEN 325 MG/1
650 TABLET ORAL EVERY 6 HOURS
Status: DISCONTINUED | OUTPATIENT
Start: 2022-06-27 | End: 2022-06-27 | Stop reason: HOSPADM

## 2022-06-27 RX ORDER — CEFAZOLIN SODIUM 1 G/3ML
INJECTION, POWDER, FOR SOLUTION INTRAMUSCULAR; INTRAVENOUS PRN
Status: DISCONTINUED | OUTPATIENT
Start: 2022-06-27 | End: 2022-06-27 | Stop reason: SURG

## 2022-06-27 RX ORDER — ROCURONIUM BROMIDE 10 MG/ML
INJECTION, SOLUTION INTRAVENOUS PRN
Status: DISCONTINUED | OUTPATIENT
Start: 2022-06-27 | End: 2022-06-27 | Stop reason: SURG

## 2022-06-27 RX ADMIN — FENTANYL CITRATE 100 MCG: 50 INJECTION, SOLUTION INTRAMUSCULAR; INTRAVENOUS at 08:18

## 2022-06-27 RX ADMIN — HALOPERIDOL LACTATE 1 MG: 5 INJECTION, SOLUTION INTRAMUSCULAR at 09:21

## 2022-06-27 RX ADMIN — LIDOCAINE HYDROCHLORIDE 100 MG: 20 INJECTION, SOLUTION EPIDURAL; INFILTRATION; INTRACAUDAL at 08:00

## 2022-06-27 RX ADMIN — PROPOFOL 200 MG: 10 INJECTION, EMULSION INTRAVENOUS at 08:00

## 2022-06-27 RX ADMIN — EPHEDRINE SULFATE 10 MG: 50 INJECTION, SOLUTION INTRAVENOUS at 08:07

## 2022-06-27 RX ADMIN — ROPIVACAINE HYDROCHLORIDE 30 ML: 5 INJECTION, SOLUTION EPIDURAL; INFILTRATION; PERINEURAL at 08:04

## 2022-06-27 RX ADMIN — ONDANSETRON 4 MG: 2 INJECTION INTRAMUSCULAR; INTRAVENOUS at 08:00

## 2022-06-27 RX ADMIN — SODIUM CHLORIDE, POTASSIUM CHLORIDE, SODIUM LACTATE AND CALCIUM CHLORIDE: 600; 310; 30; 20 INJECTION, SOLUTION INTRAVENOUS at 07:55

## 2022-06-27 RX ADMIN — FENTANYL CITRATE 100 MCG: 50 INJECTION, SOLUTION INTRAMUSCULAR; INTRAVENOUS at 08:00

## 2022-06-27 RX ADMIN — LIDOCAINE HYDROCHLORIDE 0.5 ML: 10 INJECTION, SOLUTION EPIDURAL; INFILTRATION; INTRACAUDAL; PERINEURAL at 07:16

## 2022-06-27 RX ADMIN — TRANEXAMIC ACID 1000 MG: 100 INJECTION, SOLUTION INTRAVENOUS at 08:48

## 2022-06-27 RX ADMIN — SUGAMMADEX 200 MG: 100 INJECTION, SOLUTION INTRAVENOUS at 08:55

## 2022-06-27 RX ADMIN — CEFAZOLIN 2 G: 330 INJECTION, POWDER, FOR SOLUTION INTRAMUSCULAR; INTRAVENOUS at 08:00

## 2022-06-27 RX ADMIN — ONDANSETRON 4 MG: 2 INJECTION INTRAMUSCULAR; INTRAVENOUS at 08:48

## 2022-06-27 RX ADMIN — HYDROMORPHONE HYDROCHLORIDE 1 MG: 2 INJECTION INTRAMUSCULAR; INTRAVENOUS; SUBCUTANEOUS at 08:28

## 2022-06-27 RX ADMIN — DEXAMETHASONE SODIUM PHOSPHATE 8 MG: 4 INJECTION, SOLUTION INTRA-ARTICULAR; INTRALESIONAL; INTRAMUSCULAR; INTRAVENOUS; SOFT TISSUE at 08:00

## 2022-06-27 RX ADMIN — MAGNESIUM SULFATE HEPTAHYDRATE 4 G: 40 INJECTION, SOLUTION INTRAVENOUS at 08:22

## 2022-06-27 RX ADMIN — SODIUM CHLORIDE, POTASSIUM CHLORIDE, SODIUM LACTATE AND CALCIUM CHLORIDE: 600; 310; 30; 20 INJECTION, SOLUTION INTRAVENOUS at 07:16

## 2022-06-27 RX ADMIN — TRANEXAMIC ACID 1000 MG: 100 INJECTION, SOLUTION INTRAVENOUS at 08:00

## 2022-06-27 RX ADMIN — ACETAMINOPHEN 650 MG: 325 TABLET ORAL at 13:43

## 2022-06-27 RX ADMIN — FENTANYL CITRATE 50 MCG: 50 INJECTION, SOLUTION INTRAMUSCULAR; INTRAVENOUS at 08:22

## 2022-06-27 RX ADMIN — ROCURONIUM BROMIDE 50 MG: 10 INJECTION, SOLUTION INTRAVENOUS at 08:00

## 2022-06-27 RX ADMIN — CEFAZOLIN 2 G: 2 INJECTION, POWDER, FOR SOLUTION INTRAMUSCULAR; INTRAVENOUS at 12:45

## 2022-06-27 ASSESSMENT — PAIN DESCRIPTION - PAIN TYPE
TYPE: SURGICAL PAIN

## 2022-06-27 ASSESSMENT — COGNITIVE AND FUNCTIONAL STATUS - GENERAL
STANDING UP FROM CHAIR USING ARMS: A LITTLE
HELP NEEDED FOR BATHING: A LITTLE
MOBILITY SCORE: 19
MOVING TO AND FROM BED TO CHAIR: A LITTLE
CLIMB 3 TO 5 STEPS WITH RAILING: A LITTLE
WALKING IN HOSPITAL ROOM: A LITTLE
MOBILITY SCORE: 20
CLIMB 3 TO 5 STEPS WITH RAILING: A LITTLE
SUGGESTED CMS G CODE MODIFIER MOBILITY: CJ
DRESSING REGULAR LOWER BODY CLOTHING: A LITTLE
DAILY ACTIVITIY SCORE: 23
SUGGESTED CMS G CODE MODIFIER DAILY ACTIVITY: CI
DAILY ACTIVITIY SCORE: 23
SUGGESTED CMS G CODE MODIFIER DAILY ACTIVITY: CI
MOVING TO AND FROM BED TO CHAIR: UNABLE
SUGGESTED CMS G CODE MODIFIER MOBILITY: CK
MOVING FROM LYING ON BACK TO SITTING ON SIDE OF FLAT BED: A LITTLE

## 2022-06-27 ASSESSMENT — LIFESTYLE VARIABLES
HAVE PEOPLE ANNOYED YOU BY CRITICIZING YOUR DRINKING: NO
HAVE YOU EVER FELT YOU SHOULD CUT DOWN ON YOUR DRINKING: NO
EVER FELT BAD OR GUILTY ABOUT YOUR DRINKING: NO
TOTAL SCORE: 0
HOW MANY TIMES IN THE PAST YEAR HAVE YOU HAD 5 OR MORE DRINKS IN A DAY: 0
TOTAL SCORE: 0
ON A TYPICAL DAY WHEN YOU DRINK ALCOHOL HOW MANY DRINKS DO YOU HAVE: 0
EVER HAD A DRINK FIRST THING IN THE MORNING TO STEADY YOUR NERVES TO GET RID OF A HANGOVER: NO
CONSUMPTION TOTAL: NEGATIVE
TOTAL SCORE: 0
ALCOHOL_USE: NO
AVERAGE NUMBER OF DAYS PER WEEK YOU HAVE A DRINK CONTAINING ALCOHOL: 0

## 2022-06-27 ASSESSMENT — GAIT ASSESSMENTS
DISTANCE (FEET): 150
DEVIATION: ANTALGIC;STEP TO
ASSISTIVE DEVICE: FRONT WHEEL WALKER
GAIT LEVEL OF ASSIST: STANDBY ASSIST

## 2022-06-27 ASSESSMENT — FIBROSIS 4 INDEX
FIB4 SCORE: 1.72
FIB4 SCORE: 1.72

## 2022-06-27 ASSESSMENT — PATIENT HEALTH QUESTIONNAIRE - PHQ9
2. FEELING DOWN, DEPRESSED, IRRITABLE, OR HOPELESS: NOT AT ALL
SUM OF ALL RESPONSES TO PHQ9 QUESTIONS 1 AND 2: 0
1. LITTLE INTEREST OR PLEASURE IN DOING THINGS: NOT AT ALL

## 2022-06-27 ASSESSMENT — PAIN SCALES - GENERAL: PAIN_LEVEL: 2

## 2022-06-27 ASSESSMENT — ACTIVITIES OF DAILY LIVING (ADL): TOILETING: INDEPENDENT

## 2022-06-27 NOTE — DISCHARGE INSTRUCTIONS
Dr. Ortiz's Discharge Instructions:  The first week after your joint replacement is a time to recover from the surgery. We expect light exercise to keep you active and mobile. Dr. Ortiz requires a walker or cane for most patients in the first few days following surgery to try to prevent falls or complications.     Most patients are prescribed two medications for pain control: a narcotic such as Oxycodone or Hydrocodone and a milder medication called Tramadol. These can be alternated for pain control, and the priority is to decrease use of the stronger narcotic as soon as tolerated.   Take your pain medication as appropriate to ensure that your pain is not limiting your recovery. You'll be seen in clinic in 7-10 days (this appointment has already been made, call our office if you're unsure of the time). At that time, we'll prescribe your physical therapy to help with the recovery phase.     -Keep dressing clean and dry. Leave dressing in place until follow up. If you have an incisional vac dressing, the battery may die before your first post operative appointment, but you can leave the surgical dressing in place and it will be removed at your clinic visit. The battery of the dressing may die, and the sponge will inflate because it is no longer holding suction. You can still leave the dressing in place and it will be removed at the office. Call the office if you notice drainage from the surgical dressing.    -OK to shower, keep dressing in place. Pat dressing dry, do not rub incision    -Do not soak incision in bath, hot tub or pool    -Follow up with Dr. Ortiz at regularly scheduled time    -Call [facility] office at [phone] for questions    -Weight bearing status: as tolerated with walker/cane    -Take medication as prescribed for DVT prophylaxis        Discharge Instructions    Discharged to home by car with relative. Discharged via wheelchair, hospital escort: Yes.  Special equipment needed: Not Applicable    Be  sure to schedule a follow-up appointment with your primary care doctor or any specialists as instructed.     Discharge Plan:   Diet Plan: Discussed  Activity Level: Discussed  Confirmed Follow up Appointment: Patient to Call and Schedule Appointment  Confirmed Symptoms Management: Discussed  Medication Reconciliation Updated: Yes    I understand that a diet low in cholesterol, fat, and sodium is recommended for good health. Unless I have been given specific instructions below for another diet, I accept this instruction as my diet prescription.   Other diet: Resume home diet     Special Instructions: Discharge instructions for the Orthopedic Patient    Follow up with Primary Care Physician within 2 weeks of discharge to home, regarding:  Review of medications and diagnostic testing.  Surveillance for medical complications.  Workup and treatment of osteoporosis, if appropriate.     -Is this a Hip/Knee/Shoulder Joint Replacement patient? Yes   TOTAL KNEE REPLACEMENT, AFTER-CARE GUIDELINES     These instructions provide you with information on caring for yourself and your knee after surgery. Your health care provider may also give you instructions that are more specific. Your treatment was planned and performed according to current medical practices but problems sometimes occur. Call your health care provider if you have any problems or questions.     WHAT TO EXPECT AFTER THE PROCEDURE   After your procedure, your knee will typically be stiff, sore, and bruised. This will improve over time.     Pain   Follow your home pain management plan as discussed with your nurse and as directed by your provider.   It is important to follow any scheduled pain medications for maximal pain relief.   If prescribed opioid medication, the goal is to use opioids only as needed and to wean off prescription pain medicine as soon as possible.   Ice can be used for pain control.   Put ice in a plastic bag.   Place a towel between your skin  and the bag.   Leave the ice on for 20 minutes, 2-3 times a day at a minimum.   Most patients are off the pain pills by 3 weeks. If your pain continues to be severe, follow up with your provider.     Infection   Knee joint infections occur in fewer than 2% of patients. The most common causes of infection following total knee replacement surgery are from bacteria that enter the bloodstream during dental procedures, urinary tract infections, or skin infections. These bacteria can lodge around your knee replacement and cause an infection.   Keep the incision as clean and dry as possible.   Always wash your hands before touching your incision.   Avoid dental care for 3 months after surgery. Your provider may recommend taking a dose of antibiotics an hour prior to any dental procedure. After 2 years, most providers recommend antibiotics only before an extensive procedure. Ask your provider what they recommend.   Signs and symptoms of infection include low-grade fever, redness, pain, swelling and drainage from your incision. Notify your provider IMMEDIATELY if you develop ANY of these symptoms.     Post op Disturbances   Bowel Habits - Constipation is extremely common and caused by a combination of anesthesia, lack of mobility, dehydration and pain medicine. Use stool softeners or laxatives if necessary. It is important not to ignore this problem as bowel obstructions can be a serious complication after joint replacement surgery.   Mood/Energy Level - Many patients experience a lack of energy and endurance for up to 2-3 months after surgery. Some people feel down and can even become depressed. This is likely due to postoperative anemia, change in activity level, lack of sleep, pain medicine and just the emotional reaction to the surgery itself that is a big disruption in a person’s life. This usually passes. If symptoms persist, follow up with your primary care provider.  Returning to Work - Your provider will give you  specific instructions based on your profession. Generally, if you work a sedentary job requiring little standing or walking, most patients may return within 2-6 weeks. Manual labor jobs involving walking, lifting and standing may take 3-4 months. Your provider’s office can provide a release to part-time or light duty work early on in your recovery and progress you to full duty as able.   Driving - You can begin driving once cleared by your provider, provided you are no longer taking narcotic pain medication or any other medications that impair driving. Discuss the length of time expected with your provider as returning to driving depends on things such as your vehicle, which knee was replaced (right or left), and knee motion, strength and reflexes returning appropriately.   Avoiding falls - A fall during the first few weeks after surgery can damage your new knee and may result in a need for further surgery.  throw rugs and tack down loose carpeting. Be aware of floor hazards such as pets, small objects or uneven surfaces. Notify your provider of any falls.   Airport Metal Detectors - The sensitivity of metal detectors varies and it is likely that your prosthesis will cause an alarm. Inform the  of your artificial joint.     Diet   Resume your normal diet as tolerated.   It is important to achieve a healthy nutritional status by eating a well-balanced diet on a regular basis.   Your provider may recommend that you take iron and vitamin supplements.   Continue to drink plenty of fluids.     Shower/Bathing   You may shower as soon as you get home from the hospital unless otherwise instructed.   Keep your incision out of water to prevent infection. To keep the incision dry when showering, cover it with a plastic bag or plastic wrap. If your bandage is waterproof, this may not be necessary. o Pat incision dry if it gets wet. Do not rub. Notify your provider.   Do not submerge in a bath until  cleared by your provider. Your staples must be out and the incision completely healed.     Dressing Change: Only change your dressing if directed by your provider.   Wash hands.   Open all dressing change materials.   Remove old dressing and discard.   Inspect incision for signs of irritation or infection including redness, increase in clear drainage, yellow/green drainage, odor and surrounding skin hot to touch. Notify your provider if present.    the new dressing by one corner and lay over the incision. Be careful not to touch the inside of the dressing that will lay over the incision.   Secure in place as instructed. Swelling/Bruising   Swelling is normal after knee replacement and can involve the thigh, knee, calf and foot.   Swelling can last from 3-6 months.   To reduce swelling, elevate your leg higher than your heart while reclining. The first week you are home you should elevate your leg an equal amount of time as you are active.   The swelling is usually worse after you go home since you are upright for longer periods of time.   Bruising often does not appear until after you arrive home and can be quite dramatic- appearing purple, black, or green. Bruising is typically not concerning and will subside without any treatment.     Blood Clot Prevention   Your treatment plan includes multiple preventative measures to decrease the risk of blood clots in the legs (DVTs) and the less common, but serious, clots that travel to the lungs (pulmonary emboli). Most patients are at standard risk for them, but people who are at higher risk include those who have had previous clots, a family history of clotting, smoking, diabetes, obesity, advanced age, use estrogen and/or live a sedentary lifestyle.     Signs of blood clots in legs include - Swelling in thigh, calf or ankle that does not go down with elevation. Pain, heat and tenderness in calf, back of calf or groin area. NOTE: blood clots can occur in either leg.    Signs of blood clots in lungs include - Sudden increased shortness of breath, sudden onset of chest pain, and localized chest pain with coughing.   If you experience any of the above symptoms, notify your provider and seek medical attention immediately.   You received anticoagulant therapy (blood thinners) in the hospital. Continue the prescribed blood-thinning medication at home, as directed by your provider.   Your risk for developing a clot continues for up to 2-3 months after surgery. Avoid prolonged sitting and dehydration (long air trips and car trips). If you do take a trip during this time, please get up, move around every 1-1.5 hours, and discuss all travel plans with your provider.     Activity   Once home, stay active. The key is not to overdo it. While you can expect some good days and some bad days, you should notice a gradual improvement and a gradual increase in your endurance over the next 6 to 12 months. Exercise is a critical component of recovery, particularly during the first few weeks after surgery.     Normal activities of daily living - Expect to resume most within 3 to 6 weeks following surgery. Some pain with activity and at night is common for several weeks after surgery. Walk as much as you like once your doctor gives permission to proceed, but remember that walking is no substitute for the exercises your doctor and physical therapist prescribe. Use a walker, crutches or cane to assist with walking until you can walk smoothly (minimal or no limp) without assistance.   Physical Therapy Exercises - Follow your home exercise program as instructed by your physical therapist during your hospital stay. Call and set up outpatient physical therapy appointments per your provider’s recommendations. Physical therapy after the hospital stay focuses on increasing your range of motion, strengthening your muscles and improving your gait/walking pattern. Contact your provider for the referral to  outpatient physical therapy if you have not yet received this. ?   Riding a stationary bicycle can help maintain muscle tone and keep your knee flexible. Begin stationary bicycling as directed by your physical therapist or provider.   Sexual Activity - Your provider can tell you when it safe to resume sexual activity.   Sleeping Positions - You can safely sleep on your back, on either side, or on your stomach.   Other Activities - Lower impact activities are preferred. Consult your provider if you have specific questions.     When to Call the Doctor   Call the provider if you experience:   Fever over 100.5° F   Increased pain, drainage, redness, odor or heat around the incision area   Shaking chills   Increased knee pain with activity and rest   Increased pain in calf, tenderness or redness above or below the knee   Increased swelling of calf, ankle, foot   Sudden increased shortness of breath, sudden onset of chest pain, localized chest pain with coughing   Incision opening   Or, if there are any questions or concerns about medications or care.     Infection statistic resource:   [email or web address]ot/contents/prosthetic-joint-infection-epidemiology-microbiology-clinical-manifestations-and-diagnosis     -Is this patient being discharged with medication to prevent blood clots?  Yes, Aspirin   Aspirin, ASA oral tablets  What is this medicine?  ASPIRIN (AS pir in) is a pain reliever. It is used to treat mild pain and fever. This medicine is also used as directed by a doctor to prevent and to treat heart attacks, to prevent strokes and blood clots, and to treat arthritis or inflammation.  This medicine may be used for other purposes; ask your health care provider or pharmacist if you have questions.  COMMON BRAND NAME(S): Aspir-Low, Aspir-Sidra, Aspirtab, Lu Advanced Aspirin, Lu Aspirin, Lu Aspirin Extra Strength, Lu Aspirin Plus, Lu Extra Strength, Lu Extra Strength Plus, Virax Genuine Aspirin,  Lu Womens Aspirin, Bufferin, Bufferin Extra Strength, Bufferin Low Dose  What should I tell my health care provider before I take this medicine?  They need to know if you have any of these conditions:  anemia  asthma  bleeding problems  child with chickenpox, the flu, or other viral infection  diabetes  gout  if you frequently drink alcohol containing drinks  kidney disease  liver disease  low level of vitamin K  lupus  smoke tobacco  stomach ulcers or other problems  an unusual or allergic reaction to aspirin, tartrazine dye, other medicines, dyes, or preservatives  pregnant or trying to get pregnant  breast-feeding  How should I use this medicine?  Take this medicine by mouth with a glass of water. Follow the directions on the package or prescription label. You can take this medicine with or without food. If it upsets your stomach, take it with food. Do not take your medicine more often than directed.  Talk to your pediatrician regarding the use of this medicine in children. While this drug may be prescribed for children as young as 12 years of age for selected conditions, precautions do apply. Children and teenagers should not use this medicine to treat chicken pox or flu symptoms unless directed by a doctor.  Patients over 65 years old may have a stronger reaction and need a smaller dose.  Overdosage: If you think you have taken too much of this medicine contact a poison control center or emergency room at once.  NOTE: This medicine is only for you. Do not share this medicine with others.  What if I miss a dose?  If you are taking this medicine on a regular schedule and miss a dose, take it as soon as you can. If it is almost time for your next dose, take only that dose. Do not take double or extra doses.  What may interact with this medicine?  Do not take this medicine with any of the following medications:  cidofovir  ketorolac  probenecid  This medicine may also interact with the following  medications:  alcohol  alendronate  bismuth subsalicylate  flavocoxid  herbal supplements like feverfew, garlic, kristy, ginkgo biloba, horse chestnut  medicines for diabetes or glaucoma like acetazolamide, methazolamide  medicines for gout  medicines that treat or prevent blood clots like enoxaparin, heparin, ticlopidine, warfarin  other aspirin and aspirin-like medicines  NSAIDs, medicines for pain and inflammation, like ibuprofen or naproxen  pemetrexed  sulfinpyrazone  varicella live vaccine  This list may not describe all possible interactions. Give your health care provider a list of all the medicines, herbs, non-prescription drugs, or dietary supplements you use. Also tell them if you smoke, drink alcohol, or use illegal drugs. Some items may interact with your medicine.  What should I watch for while using this medicine?  If you are treating yourself for pain, tell your doctor or health care professional if the pain lasts more than 10 days, if it gets worse, or if there is a new or different kind of pain. Tell your doctor if you see redness or swelling. Also, check with your doctor if you have a fever that lasts for more than 3 days. Only take this medicine to prevent heart attacks or blood clotting if prescribed by your doctor or health care professional.  Do not take aspirin or aspirin-like medicines with this medicine. Too much aspirin can be dangerous. Always read the labels carefully.  This medicine can irritate your stomach or cause bleeding problems. Do not smoke cigarettes or drink alcohol while taking this medicine. Do not lie down for 30 minutes after taking this medicine to prevent irritation to your throat.  If you are scheduled for any medical or dental procedure, tell your healthcare provider that you are taking this medicine. You may need to stop taking this medicine before the procedure.  This medicine may be used to treat migraines. If you take migraine medicines for 10 or more days a month,  your migraines may get worse. Keep a diary of headache days and medicine use. Contact your healthcare professional if your migraine attacks occur more frequently.  What side effects may I notice from receiving this medicine?  Side effects that you should report to your doctor or health care professional as soon as possible:  allergic reactions like skin rash, itching or hives, swelling of the face, lips, or tongue  breathing problems  changes in hearing, ringing in the ears  confusion  general ill feeling or flu-like symptoms  pain on swallowing  redness, blistering, peeling or loosening of the skin, including inside the mouth or nose  signs and symptoms of bleeding such as bloody or black, tarry stools; red or dark-brown urine; spitting up blood or brown material that looks like coffee grounds; red spots on the skin; unusual bruising or bleeding from the eye, gums, or nose  trouble passing urine or change in the amount of urine  unusually weak or tired  yellowing of the eyes or skin  Side effects that usually do not require medical attention (report to your doctor or health care professional if they continue or are bothersome):  diarrhea or constipation  headache  nausea, vomiting  stomach gas, heartburn  This list may not describe all possible side effects. Call your doctor for medical advice about side effects. You may report side effects to FDA at 5-506-FDA-8349.  Where should I keep my medicine?  Keep out of the reach of children.  Store at room temperature between 15 and 30 degrees C (59 and 86 degrees F). Protect from heat and moisture. Do not use this medicine if it has a strong vinegar smell. Throw away any unused medicine after the expiration date.  NOTE: This sheet is a summary. It may not cover all possible information. If you have questions about this medicine, talk to your doctor, pharmacist, or health care provider.  © 2020 [employer]/Gold Standard (2018-01-30 10:42:13)    Is patient discharged on  Warfarin / Coumadin?   No     Depression / Suicide Risk    As you are discharged from this [facility] facility, it is important to learn how to keep safe from harming yourself.    Recognize the warning signs:  Abrupt changes in personality, positive or negative- including increase in energy   Giving away possessions  Change in eating patterns- significant weight changes-  positive or negative  Change in sleeping patterns- unable to sleep or sleeping all the time   Unwillingness or inability to communicate  Depression  Unusual sadness, discouragement and loneliness  Talk of wanting to die  Neglect of personal appearance   Rebelliousness- reckless behavior  Withdrawal from people/activities they love  Confusion- inability to concentrate     If you or a loved one observes any of these behaviors or has concerns about self-harm, here's what you can do:  Talk about it- your feelings and reasons for harming yourself  Remove any means that you might use to hurt yourself (examples: pills, rope, extension cords, firearm)  Get professional help from the community (Mental Health, Substance Abuse, psychological counseling)  Do not be alone:Call your Safe Contact- someone whom you trust who will be there for you.  Call your local CRISIS HOTLINE 176-9540 or 954-719-0498  Call your local Children's Mobile Crisis Response Team [place] (544) 672-3795 or www.[employer]  Call the toll free National Suicide Prevention Hotlines   National Suicide Prevention Lifeline 883-302-ZNLW (5211)  National Hope Line Network 800-SUICIDE (730-2612)

## 2022-06-27 NOTE — ANESTHESIA PROCEDURE NOTES
Airway    Date/Time: 6/27/2022 8:00 AM  Performed by: Walter Edwards D.O.  Authorized by: Walter Edwards D.O.     Location:  OR  Urgency:  Elective  Indications for Airway Management:  Anesthesia      Spontaneous Ventilation: absent    Sedation Level:  Deep  Preoxygenated: Yes    Mask Difficulty Assessment:  0 - not attempted  Final Airway Type:  Supraglottic airway  Final Supraglottic Airway:  Standard LMA    SGA Size:  4  Number of Attempts at Approach:  1

## 2022-06-27 NOTE — OR NURSING
1110: report received from Zaheer STEVENS. Pt w/o changes, waiting on transport to room.     1119: Pt discharged to room via bed.

## 2022-06-27 NOTE — PROGRESS NOTES
0905-received patient from OR. Patient very restless in bed. Not able to follow instruction.     0910-patient verbalizes that she has nausea. Haldol given for it.    0947-patient states that her nausea is getting better.    1000-called daughter with update and room assignment    1100-Daughter called    1100-report called to ayad cosby

## 2022-06-27 NOTE — ANESTHESIA PROCEDURE NOTES
Peripheral Block    Date/Time: 6/27/2022 8:02 AM  Performed by: Walter Edwards D.O.  Authorized by: Walter Edwards D.O.     Patient Location:  OR  Start Time:  6/27/2022 8:02 AM  End Time:  6/27/2022 8:04 AM  Reason for Block: at surgeon's request and post-op pain management ONLY    patient identified, IV checked, site marked, risks and benefits discussed, surgical consent, monitors and equipment checked, pre-op evaluation and timeout performed    Patient Position:  Supine  Prep: ChloraPrep    Monitoring:  Heart rate, continuous pulse ox and cardiac monitor  Block Region:  Lower Extremity  Lower Extremity - Block Type:  Selective FEMORAL nerve block at the Adductor Canal    Laterality:  Left  Procedures: ultrasound guided  Image captured, interpreted and electronically stored.  Block Type:  Single-shot  Needle Length:  100mm  Needle Gauge:  21 G  Needle Localization:  Ultrasound guidance  Injection Assessment:  Negative aspiration for heme, no paresthesia on injection, incremental injection and local visualized surrounding nerve on ultrasound  Evidence of intravascular injection: No     US Guided Selective Femoral Nerve Block at Adductor Canal:   US probe placed at mid-thigh level on externally rotated leg and femur identified.  Probe directed medially until Sartorius Muscle (SM), Femoral Artery (FA) and Saphenous Nerve (SN) identified in Adductor Canal (AC).  Needle inserted anterolateral to probe in an in plane approach into a subsartorial perivascular perineural position.  After negative aspiration LA injected with ease and visualized spreading within the AC.

## 2022-06-27 NOTE — PROGRESS NOTES
S:  s/p left TKA  Pain controlled  No N/V  No Chest Pain/SOB  Afebrile  Exam:    NAD A& O x3    RLE: +DF/PF/EHL SILT L4/L5/S1  LLE:  +DF/PF/EHL SILT L4/L5/S1    Plan:  Continue standard plan of care  Weight bearing: as tolerated with walker/cane  DVT prophylaxis: SCD/Teds + ASA  Dispo: home today  X-ray reviewed. All components in proper position for cementless TKA

## 2022-06-27 NOTE — PROGRESS NOTES
"Pt arrived to unit, calm, cooperative, pleasant affect, DTR at bedside, A&Ox4. CNA started post op VS, SCDs on for VTE, polar ice to left knee. RN reviewed POC which is for post op rest / recovery / work w/ therapy / discharge planning once criteria met. Pt currently experiencing bouts of nausea, RN has encouraged slow ingestions of fluids starting w/ ice chips (as tolertaed) , saltine cracker as tolerated) and to practice CDB w/ \"I.S.\" q hour. Will reinforce t/o day.  Pt is independent w/ turns for skin integrity.Fall and safety precautions in place, pt stated understanding to use call light for all ADL needs. Hourly rounds ongoing call light w/in reach.   "

## 2022-06-27 NOTE — PROGRESS NOTES
4 Eyes Skin Assessment Completed byRODNEY Haile and RODNEY Angelo.    Head WDL  Ears WDL  Nose WDL  Mouth WDL  Neck WDL  Breast/Chest WDL  Shoulder Blades WDL  Spine WDL  (R) Arm/Elbow/Hand WDL  (L) Arm/Elbow/Hand WDL  Abdomen WDL  Groin WDL  Scrotum/Coccyx/Buttocks WDL  (R) Leg WDL  (L) Leg Incision knee / drsg in place  (R) Heel/Foot/Toe WDL  (L) Heel/Foot/Toe WDL          Devices In Places SCD's      Interventions In Place Pressure Redistribution Mattress    Possible Skin Injury No    Pictures Uploaded Into Epic N/A  Wound Consult Placed N/A  RN Wound Prevention Protocol Ordered No

## 2022-06-27 NOTE — DISCHARGE PLANNING
Anticipated Discharge Disposition: Home with DME walker    Action:  LSW met with pt at bedside to collect DME choice for a walker. LSW explained pt's choices with [facility]. Pt gave verbal consent to send referral to [facility]. LSW faxed choice form to [facility]. LSW notified bedside RN to get walker.      Barriers to Discharge: None    Plan: LSW to follow and assist as needed.

## 2022-06-27 NOTE — DISCHARGE SUMMARY
Giselle Ramon was admitted on 6/27/2022 for Arthritis of knee, left [M17.12]  Patient was diagnosed with severe degenerative arthritis in the left knee and underwent a left total knee arthroplasty by Dr. Nj Ortiz on the date of admission. Please see dictated operative note for further information.    Hospital course: standard    The patient has done well, with no complications.  Patient denies chest pain, calf pain or shortness of breath.   Pain is well-controlled at present.  Patient is ambulating well with the use of an assistive device, and progressing in physical therapy.   Patient is neurologically and vascularly intact with palpable pedal pulses bilaterally.   Narcotic dosages were chosen by taking into account the the patient's previous history of opoid use and to ensure proper pain control after surgery    Discharge date: 6-27-22    Patient is being discharged to home after am physical therapy.     Allergies:  Codeine, Penicillins, Sulfa drugs, Morphine, and Trimethoprim       Medication List      CONTINUE taking these medications      Instructions   acetaminophen 500 MG Tabs  Commonly known as: TYLENOL   Take 1,000 mg by mouth every 6 hours as needed.  Dose: 1,000 mg     aspirin 81 MG EC tablet   Take 1 Tablet by mouth 2 times a day for 30 days.  Dose: 81 mg     BIOTIN PO   Take  by mouth every day.     Calcium Carbonate Antacid 850 MG Chew   Take 1 Tab by mouth.  Dose: 1 Tablet     CALCIUM PO   Take  by mouth every day.     cetirizine 10 MG Tabs  Commonly known as: ZYRTEC   Take 10 mg by mouth 2 times a day.  Dose: 10 mg     CINNAMON PO   Take  by mouth.     COQ10 PO   Take  by mouth every day.     CORAL CALCIUM PO   Take  by mouth.     CRANBERRY PO   Take  by mouth.     docosahexanoic acid 1000 MG Caps  Commonly known as: OMEGA 3 FA      docusate sodium 100 MG Caps  Commonly known as: Colace   Take 1 Capsule by mouth 2 times a day for 30 days.  Dose: 100 mg     fluticasone 50 MCG/ACT nasal  spray  Commonly known as: FLONASE   fluticasone propionate 50 mcg/actuation nasal spray,suspension     MAGNESIUM PO   Take  by mouth every day.     * meloxicam 15 MG tablet  Commonly known as: MOBIC   Take 15 mg by mouth every day.  Dose: 15 mg     * meloxicam 7.5 MG Tabs  Commonly known as: MOBIC   Take 1 Tablet by mouth 2 times a day for 30 days.  Dose: 7.5 mg     montelukast 10 MG Tabs  Commonly known as: SINGULAIR   TAKE 1 TABLET BY MOUTH ONE TIME DAILY AT BEDTIME     NALTREXONE HCL PO   naltrexone   4.5 mg @ HS     Non Formulary Request   2 Capsules 2 (two) times a day. Hydro Eye  Dose: 2 Capsule     NON SPECIFIED   2 Tablets every day. OTC vitamin for hair  Dose: 2 Tablet     ondansetron 4 MG Tbdp  Commonly known as: ZOFRAN ODT   Take 4 mg by mouth every four hours as needed for Nausea.  Dose: 4 mg     OSTEO BI-FLEX ADV DOUBLE ST PO   Take 2 Tablets by mouth every day.  Dose: 2 Tablet     oxyCODONE immediate-release 5 MG Tabs  Commonly known as: ROXICODONE   Take 1 Tablet by mouth every four hours as needed for Severe Pain for up to 7 days.  Dose: 5 mg     PROBIOTIC PO   Take 2 Capsules by mouth every day.  Dose: 2 Capsule     SAMe 400 MG Tabs   Take  by mouth.     traMADol 50 MG Tabs  Commonly known as: ULTRAM   Take 1 Tablet by mouth every 6 hours as needed for Severe Pain (ALTERNATING WITH OXYCODONE) for up to 10 days.  Dose: 50 mg     VITAMIN B-12 PO   Take  by mouth every day.     VITAMIN D3 PO   Take  by mouth every day.     VITAMIN K2 PO   Take  by mouth.         * This list has 2 medication(s) that are the same as other medications prescribed for you. Read the directions carefully, and ask your doctor or other care provider to review them with you.                Discharge Instructions:     Patient is instructed to ambulate and weight bear as tolerated with the use of an assistive device, and to continue physical therapy exercises given during this hospital stay. Strict posterior hip precautions are to  be observed for patients who underwent a total hip replacement.   Patient is to ice and elevate the surgical leg regularly, with pillows under the ankle, nothing is to be placed under the knee.   Patient was given detailed wound care instructions, and will leave the Incisional vac or silver dressing on until first post-op visit.   ASA twice daily for DVT prophylaxis.  Patient is to follow up with Dr. Ortiz's office in 1-2 weeks.

## 2022-06-27 NOTE — OR NURSING
This RN pulled dilaudid syringe under this patient (wrong patient). This (wrong) patient DID NOT receive any of the dilaudid that was pulled- it WAS given to the RIGHT patient. A return was done under this patient (WRONG patient) per pharmacy instructions without actually dropping now opened dilaudid syringe in return basin. Called pharmacy in order to reconcile issue. Pulled dilaudid under RIGHT patient (not this patient), wrote and signed handwritten note, and physically dispensed unopened dilaudid syringe to 2nd floor pharmacy with handwritten note detailing occurrence of events.  During this sequence of events, NONE of the patients involved were incorrectly administered medications.

## 2022-06-27 NOTE — ANESTHESIA TIME REPORT
Anesthesia Start and Stop Event Times     Date Time Event    6/27/2022 0755 Ready for Procedure     0755 Anesthesia Start     0903 Anesthesia Stop        Responsible Staff  06/27/22    Name Role Begin End    Walter Edwards D.O. Anesth 0755 0903        Overtime Reason:  no overtime (within assigned shift)    Comments:

## 2022-06-27 NOTE — ANESTHESIA POSTPROCEDURE EVALUATION
Patient: Giselle Ramon    Procedure Summary     Date: 06/27/22 Room / Location:  OR 06 / SURGERY [facility]    Anesthesia Start: 0755 Anesthesia Stop: 0903    Procedure: ARTHROPLASTY, KNEE, TOTAL (Left Knee) Diagnosis:       Arthritis of left knee      (Arthritis of left knee)    Surgeons: Nj Ortiz M.D. Responsible Provider: Walter Edwards D.O.    Anesthesia Type: general, peripheral nerve block ASA Status: 2          Final Anesthesia Type: general, peripheral nerve block  Last vitals  BP   Blood Pressure : 134/56    Temp   36 °C (96.8 °F)    Pulse   97   Resp   12    SpO2   95 %      Anesthesia Post Evaluation    Patient location during evaluation: PACU  Patient participation: complete - patient participated  Level of consciousness: awake and alert  Pain score: 2    Airway patency: patent  Anesthetic complications: no  Cardiovascular status: hemodynamically stable  Respiratory status: acceptable  Hydration status: euvolemic    PONV: none          No complications documented.     Nurse Pain Score: 2 (NPRS)

## 2022-06-27 NOTE — OP REPORT
Date of Surgery:  6-27-22  Pre-operative Diagnosis:  left knee arthritis    Post-operative Diagnosis:  left knee arthritis    Procedure:  left knee replacement    Implants used:  A Mary Jo Triathlon CR total knee arthroplasty system with the following components  Femur: 5 left  Tibia: 5 Tritanium  Polyethylene: 9 mm CS  Patella: A32 MB  Fixation: Press-Fit    Surgeon:  jN Ortiz MD    1st Assistant:   GARCIA Lam    Anesthesiologist:   CHRISTIE Edwards DO    EBL:  100 cc    Specimen:  None    Indications for procedure:  This patient is a 76 y.o. female with a long standing history of left knee arthritis. The patient had failed conservative therapy including anti-inflammatory medications, activity modifications, injections, physical therapy and assistive devices. she was indicated for a total knee replacement. The patient expressed an understanding of the risks of pain, bleeding, infection, dislocation, malalignment, need for further surgery, damage to surrounding structures, neurovascular compromise, blood clots, leg-length discrepancy both apparent and actual, anesthetic complications, and serious medical consequences including but not limited to heart attack, stroke or even death. It was explained that the implants are man-made products and thus subject to possible failure.  The patient expressed an understanding and wished to proceed. Specific risks based on the patient's medical history were addressed. A clearance was obtained by the medical physicians and the patient was taken to the operating room on the day of surgery for the above named procedure.     Description of procedure:  The patient was met in the pre-operative holding area. The left knee was marked as the appropriate surgical site with indelible ink. They were taken to the operating room where the anesthesia department started a adductor/general for intraoperative and post-operative anesthesia and pain control. The patient was placed on the OR table  "and a tourniquet was placed high on the operative thigh. All bony prominences were padded appropriately. The left knee was prepped and draped in a standard sterile surgical fashion. A time out procedure was called to verify the side and site of surgery, the proposed surgical procedure, and the administration of pre-operative antibiotics. After successful completion of the time out procedure, our attention was turned to the operative knee.  The tourniquet was inflated after exsanguination with an Esmarch bandage. The knee was flexed and a straight incision was made just medial to the midline. The capsule of the knee was cleared and a midvastus arthrotomy was performed. The patella was subluxated laterally and a medial release was performed to properly visualize the posteromedial aspect of the tibial plateau. The knee was extended, the patellar tendon was protected and the infrapatellar fat pad was excised. A lateral release was performed. The patella was turned on its side and held in place with two penetrating towel clips. A free-hand patellar cut was made, the patella was sized and the appropriate lug holes were drilled. The patella was subluxed, the knee was flexed. The tourniquet was released. Hemostasis was obtained. Each hemijoint was cleared of soft tissue. The ACL was removed. The femoral canal was entered with the step drill and the distal femoral cutting guide was pinned in place in 6 degrees of valgus. The distal cut was made and the bony pieces were removed. The femur was sized to a size 5 and the appropriate cutting jig was pinned in place in 3 degrees of external rotation. The bony cuts were made, we noted a \"grand piano sign\" anteriorly. A large Baker's cyst was evacuated. Now the femur was retracted posteriorly with a lap sponge to protect the intercondylar area. The proximal tibia was exposed, the depth of our resection was based on taking 2 mm off the low side of the bone. We used the intramedullary " drill to enter the tibial canal and set our alignment based on the patient's anatomy.  We first set our depth and then our checked our varus/valgus alignment with the extramedullary guide damaris. We made our bony cuts and removed the tibial piece en bloc. The laminar spreaders were placed and the hypercurved osteotomes were used to remove posterior femoral osteophytes. The ligaments were balanced in flexion and extension.   The tibia was then sized to a size 5 and the trial component was placed in the proper amount of external rotation. A trial femoral component was placed and with the 9 mm CS polyethylene we noted full extension without recurvatum and greater than 125 degrees of flexion with appropriate patellar tracking. At this point we removed the trial components and thoroughly irrigated the wound. Osteophytes were removed. The tourniquet was reinflated.  The real components were opened in a sterile fashion on the back table and then impacted into place sequentially, first the tibia, then the femur, then the patella. The components sat well against the bone. The knee was again taken through a range of motion. Again we noted full extension and greater than 125 degrees of flexion with appropriate varus/valgus stability and patellar tracking. Therefore the real polyethylene was opened and inserted into the tibial tray. An audible click was heard as it engaged the tibia. Now a dilute betadine solution was instilled into the knee for 3 minutes before being pulse-lavaged out. The tourniquet was again released and hemostasis was obtained. The knee was flexed, the arthrotomy was closed with #1 Quill, followed by 2-0 Vicryl in the deep dermal layer in a buried interrupted fashion. The skin was closed with 3-0 monocryl in a running subcuticular fashion, and a sterile negative pressure incisional vac dressing was applied. The patient is currently in the operating room awaiting reversal of anesthesia. All sponge and  instrument counts were correct at the end of the case.

## 2022-06-27 NOTE — LETTER
May 26, 2022    Patient Name: Giselle Chew  Surgeon Name: Nj Ortiz M.D.  Surgery Facility: [facility] ([address] [facility])  Surgery Date: 6/27/2022    The time of your surgery is not final and may change up to and until the day of your surgery. You will be contacted 24-48 hours prior to your surgery date with your check-in and surgery time.    If you will not be at one of the below numbers please call/text the surgery scheduler at [phone]  Preferred Phone: [phone]    BEFORE YOUR SURGERY   Pre Registration and/or Lab Work must be done within and no earlier than 28 days prior to your surgery date. Please call [facility] at [phone] for an appointment as soon as possible.    Pre op Appointment:   Date: 6/23/2022   Time: 8:30am   Provider: LUCERO Llanes   Location: [address] [place] [id number]  Instructions: Bring a list of all medications you are taking including the dosing and frequency.    Please refrain from smoking any substance after midnight prior to surgery. Smoking may interfere with the anesthetic and frequently produces nausea during the recovery period.    Continue taking all lifesaving medications. Including the morning of your surgery with small sip of water.    Please read the MEDICATION INSTRUCTIONS below completely.    DAY OF YOUR SURGERY  Nothing to eat or drink after midnight     Please arrive at the hospital/surgery center at the check-in time provided.     An adult will need to bring you and take you home after your surgery.     AFTER YOUR SURGERY  Post op Appointment:   Date: 07/07/2022   Time: 9:30am   With: LUCERO Llanes.   Location: [address] [place] [id number]    - Therapy- Your first appointment should be 1  week(s) after your surgery. For your convenience we have 4 Physical Therapy locations: [facility], [place], and [facility]. Call our office ASAP to schedule an  appointment at [phone] or take the enclosed Therapy Prescription to a facility of your choice.  - No dental work for 3-6 months after your surgery.  - Post Surgery - You will need someone to drive you home  - Post Surgery - You will need someone to stay with you for 24 hours  - You must have someone provide transportation post surgery and someone to monitor you for at least 24 hours post-surgery. If you don't have either of these your appointment will be canceled.    TIME OFF WORK  FMLA or Disability forms can be faxed directly to: [phone] or you may drop them off at [address] [place] [address]. Our office charges a $35.00 fee per form. Forms will be completed within 10 business days of drop off and payment received. For the status of your forms you may contact our disability office directly [phone].    MEDICATION INSTRUCTIONS  The following medications should be stopped a minimum of 10 days prior to surgery:  All over the counter, Supplements & Herbal medications    Anorectics: Phentermine (Adipex-P, Lomaira and Suprenza), Phentermine-topiramate (Qsymia), Bupropion-naltrexone (Contrave)    Opiod Partial Agonists/Opioid Antagonists: Buprenorphine (Subocone, Belbuca, Butrans, Probuphine Implant, Sublocade), Naltrexone (ReVia, Vivitrol), Naloxone    Amphetamines: Dextroamphetamine/Amphetamine (Adderall, Mydayis), Methylphenidate Hydrochloride (Concerta, Metadate, Methylin, Ritalin)    The following medications should be stopped 5 days prior to surgery:  Blood Thinners: Any Aspirin, Aspirin products, anti-inflammatories such as ibuprofen and any blood thinners such as Coumadin and Plavix. Please consult your prescribing physician if you are on life saving blood thinners, in regards to when to stop medications prior to surgery.     The following medications should be stopped a minimum of 3 days prior to surgery:  PDE-5 inhibitors: Sildenafil (Viagra), Tadalafil (Cialis), Vardenafil  (Levitra), Avanafil (Stendra)    MAO Inhibitors: Rasagiline (Azilect), Selegiline (Eldepryl, Emsam, Selapar), Isocarboxazid (Marplan), Phenelzine (Nardil)

## 2022-06-27 NOTE — INTERVAL H&P NOTE
H&P reviewed. The patient was examined and there are no changes to the H&P    The risks of surgery were discussed with the patient.   These include pain, bleeding, infection, fractures and dislocations as well as damage to surrounding structures or neurovascular compromise.  The risks include the possibility of need for further surgery, lack of healing, lack of symptom relief.  The elevated risk of DVT following a joint replacement was discussed with the patient, and a plan was made for postoperative DVT prophylaxis.  We did discuss the possibility of leg length discrepancy both apparent and actual following joint replacement.  They do express an understanding that these are man-made parts, with limited lifespans, and that we would not be turning them into a bionic human being. We did discuss the various approaches to a joint replacement, and made no guarantees about incision size or postoperative discharge plans. Certainly there could be anesthetic complications such as heart attack, stroke, respiratory failure, or even death.  All of the patient's questions were answered, they were shown models of the implants and images of their x-rays.  she expressed understanding and wished to proceed.

## 2022-06-27 NOTE — THERAPY
Physical Therapy   Initial Evaluation     Patient Name: Giselle Ramon  Age:  76 y.o., Sex:  female  Medical Record #: [id number]  Today's Date: 6/27/2022     Precautions  Precautions: (P) Weight Bearing As Tolerated Left Lower Extremity    Assessment  Patient is 76 y.o. female who was recently seen s/p L TKA with WBAT orders for L LE. Pt was agreeable to therapy evaluation and presented to PT with safe upright mobility after therapy session. Pt was provided with education regarding supine therapeutic exercises, elevation, icing, and positioning of L knee in extension. Pt has a good understanding of education and dtr was present during session and is PT who can also provide education upon d/c to home. Pt was primarily limited by nausea during session, however, this appeared to improve with continued activity.  Pt demonstrated with SBA for all upright mobility with no harini LOB. Pt required frequent cues for upright posture, gait mechanics, and correct use of FWW during transfers and ambulation. Pt was able to go up/down 1 step with FWW w/SBA with correct mechanics after demo from PT. Pt is in no acute skilled PT needs at this time, anticipate pt to d/c home once nausea improves with dtr assist and recommendations for FWW use and OP therapy services.     The following txt were provided: Pt provided with extensive cues, corrections, and facilitation for appropriate gait mechanics, upright posture, and FWW use during stair navigation and flat level ambulation. Pt tends to  FWW during ambulation and was encouraged throughout session to roll FWW. Pt provided with VC, TC, and manual facilitation in order to demonstrate correct mechanics during supine and seated therapeutic exercises. Pt also provided with demo and cues to go up/down platform step with FWW use and pt was able to return demo safe mechanics.     Plan    Recommend Physical Therapy for Evaluation only     DC Equipment Recommendations: (P)  Front-Wheel Walker  Discharge Recommendations: (P) Recommend outpatient physical therapy services to address higher level deficits     Objective       06/27/22 1430   Initial Contact Note    Initial Contact Note Order Received and Verified, Evaluation Only - Patient Does Not Require Further Acute Physical Therapy at this Time.  However, May Benefit from Post Acute Therapy for Higher Level Functional Deficits.   Precautions   Precautions Weight Bearing As Tolerated Left Lower Extremity   Pain 0 - 10 Group   Location Knee   Location Orientation Left   Description Aching   Therapist Pain Assessment Prior to Activity;During Activity;Post Activity;Nurse Notified;4   Prior Living Situation   Prior Services None   Housing / Facility 2 Story House   Steps Into Home 1   Steps In Home   (flight of stairs; will stay on first floor)   Bathroom Set up Walk In Shower   Equipment Owned None   Lives with - Patient's Self Care Capacity Adult Children   Comments pt will be staying with dtr who can assist upon d/c to home; Dtr is a physical therapist   Prior Level of Functional Mobility   Bed Mobility Independent   Transfer Status Independent   Ambulation Independent   Distance Ambulation (Feet)   (community)   Assistive Devices Used None   Stairs Independent   Comments pt reports of an IPLOF   History of Falls   History of Falls No   Cognition    Cognition / Consciousness WDL   Level of Consciousness Alert   Comments pleasant/cooperative   Passive ROM Lower Body   Passive ROM Lower Body X   Comments limited due to pain; able to demo 0 to 80 deg of L knee ROM   Active ROM Lower Body    Active ROM Lower Body  X   Comments limited due to pain; able to demo 0 to 70 deg of L knee ROM; functional AROM noted   Strength Lower Body   Lower Body Strength  X   Comments limited in L LE due to pain; able to demo functional strength for household ambulation and stair navigation   Sensation Lower Body   Lower Extremity Sensation   WDL   Lower Body  Muscle Tone   Lower Body Muscle Tone  WDL   Strength Upper Body   Upper Body Strength  WDL   Upper Body Muscle Tone   Upper Body Muscle Tone  WDL   Neurological Concerns   Neurological Concerns No   Coordination Upper Body   Coordination WDL   Coordination Lower Body    Coordination Lower Body  WDL   Balance Assessment   Sitting Balance (Static) Good   Sitting Balance (Dynamic) Fair +   Standing Balance (Static) Good   Standing Balance (Dynamic) Fair +   Weight Shift Sitting Good   Weight Shift Standing Fair   Comments w/fww; no harini LOB noted   Gait Analysis   Gait Level Of Assist Standby Assist   Assistive Device Front Wheel Walker   Distance (Feet) 150   # of Times Distance was Traveled 1   Deviation Antalgic;Step To   # of Stairs Climbed 1   Level of Assist with Stairs Standby Assist   Weight Bearing Status WBAT L LE   Comments pt provided with frequent cues for upright posture during ambulation, cues to roll FWW vs. lift up, cues for maintaining body within FWW and cues to encouraged heel to toe gait mechanics. Pt was able to correct and return demo with improved mechanics with occasional reminders   Bed Mobility    Supine to Sit Minimal Assist   Scooting Supervised   Comments HOB elevated and rail up; assist with L LE due to pain to get to EOB   Functional Mobility   Sit to Stand Standby Assist   Bed, Chair, Wheelchair Transfer Standby Assist   Transfer Method Stand Step   Mobility EOB, sit<>stand, ambuation, stairs, transfer to chair   Comments cues for handplacement upon standing/sitting, and frequent reminder to kick out L LE prior to sitting   How much difficulty does the patient currently have...   Turning over in bed (including adjusting bedclothes, sheets and blankets)? 4   Sitting down on and standing up from a chair with arms (e.g., wheelchair, bedside commode, etc.) 4   Moving from lying on back to sitting on the side of the bed? 1   How much help from another person does the patient currently  need...   Moving to and from a bed to a chair (including a wheelchair)? 4   Need to walk in a hospital room? 4   Climbing 3-5 steps with a railing? 3   6 clicks Mobility Score 20   Activity Tolerance   Sitting in Chair 10 mins   Sitting Edge of Bed 5 mins   Standing 10 mins   Comments limited due to nausea and fatigue   Edema / Skin Assessment   Edema / Skin  Not Assessed   Education Group   Education Provided Role of Physical Therapist;Gait Training;Stair Training;Use of Assistive Device;Exercises - Supine;Weight Bearing Status;Weight Bearing Precautions   Role of Physical Therapist Patient Response Patient;Family;Acceptance;Explanation;Demonstration;Verbal Demonstration;Action Demonstration   Gait Training Patient Response Patient;Acceptance;Demonstration;Explanation;Verbal Demonstration;Action Demonstration;Reinforcement Needed   Stair Training Patient Response Patient;Acceptance;Explanation;Demonstration;Verbal Demonstration;Action Demonstration   Use of Assistive Device Patient Response Patient;Acceptance;Explanation;Demonstration;Verbal Demonstration;Action Demonstration   Exercises - Supine Patient Response Patient;Acceptance;Explanation;Demonstration;Handout;Verbal Demonstration;Action Demonstration;Reinforcement Needed   Weight Bearing Status Patient Response Patient;Acceptance;Explanation;Demonstration;Verbal Demonstration;Action Demonstration   Weight Bearing Precautions Patient Response Patient;Acceptance;Explanation;Demonstration;Verbal Demonstration;Action Demonstration   Anticipated Discharge Equipment and Recommendations   DC Equipment Recommendations Front-Wheel Walker   Discharge Recommendations Recommend outpatient physical therapy services to address higher level deficits   Interdisciplinary Plan of Care Collaboration   IDT Collaboration with  Nursing;Family / Caregiver   Patient Position at End of Therapy Seated;Call Light within Reach;Tray Table within Reach;Phone within Reach;Family / Friend  in Room   Collaboration Comments aware of visit and recs   Session Information   Date / Session Number  6/27 eval only   Priority 0

## 2022-06-27 NOTE — CARE PLAN
"The patient is Stable - Low risk of patient condition declining or worsening    Shift Goals : nausea will resolve, \"I.S.\" use Q hour  Clinical Goals: pt will void and ambulate 50 ft within 6 hours postop, pain tolerable at 5/10 or less  Patient Goals: decreased nausea, rest    Progress made toward(s) clinical / shift goals: Pt has tolerated clears w/out c/o N/V    Patient is not progressing towards the following goals:      "

## 2022-06-27 NOTE — PROGRESS NOTES
Pt discharged home in good and stable condition. Reviewed all discharge instructions and answered any questions. IV discontinued. Escorted downstairs via  at 1606.

## 2022-06-28 NOTE — THERAPY
Occupational Therapy   Initial Evaluation     Patient Name: Giselle Ramon  Age:  76 y.o., Sex:  female  Medical Record #: [id number]  Today's Date: 6/27/2022     Precautions: (P) Weight Bearing As Tolerated Left Lower Extremity    Assessment  Patient is 76 y.o. female s/p L TKA. Pt presents [facility]IC after PT eval; PT reports steady gait, pt eager for home. Pt is A&Ox4, pleasant and cooperative. Performs LB dressing with SBA. STS with SBA, FWW. Nausea earlier; since resolved. UB dressing with Indep/Sup. Discussed safety during ADL's, DME/AE use. FWW delivered to room. Pt owns tub transfer bench, RTS, versa frame. May benefit from a reacher. Pt toileted prior to session- spv. Pt lives alone; indep with I/ADL's prior. Pt will stay at daughter's home upon dc; dtr is a PT and able to assist if needed. DC ready from OT.              Plan  Recommend Occupational Therapy for Evaluation only     06/27/22 1535   Prior Living Situation   Prior Services None   Housing / Facility 2 Story House   Steps Into Home 1   Bathroom Set up Bathtub / Shower Combination;Tub Transfer Bench   Equipment Owned Tub Transfer Bench;Hand Held Shower;Raised Toilet Seat With Arms   Lives with - Patient's Self Care Capacity Alone and Able to Care For Self   Comments pt plans to stay at daughter's house upon dc- dtr is a PT and will be available to assist when needed.   Prior Level of ADL Function   Self Feeding Independent   Grooming / Hygiene Independent   Bathing Independent   Dressing Independent   Toileting Independent   Prior Level of IADL Function   Medication Management Independent   Laundry Independent   Kitchen Mobility Independent   Finances Independent   Home Management Independent   Shopping Independent   Prior Level Of Mobility Independent Without Device in Home   Driving / Transportation Driving Independent   Occupation (Pre-Hospital Vocational) Retired Due To Age   Leisure Interests Family   History of Falls   History of Falls No    Precautions   Precautions Weight Bearing As Tolerated Left Lower Extremity   Vitals   O2 Delivery Device None - Room Air   Pain 0 - 10 Group   Location Knee   Location Orientation Left   Therapist Pain Assessment Post Activity Pain Same as Prior to Activity;Nurse Notified   Cognition    Cognition / Consciousness WDL   Level of Consciousness Alert   Comments motivated for home today. pleasant and cooperative.   Passive ROM Upper Body   Passive ROM Upper Body WDL   Active ROM Upper Body   Active ROM Upper Body  WDL   Strength Upper Body   Upper Body Strength  WDL   Sensation Upper Body   Upper Extremity Sensation  Not Tested   Upper Body Muscle Tone   Upper Body Muscle Tone  WDL   Neurological Concerns   Neurological Concerns No   Coordination Upper Body   Coordination WDL   Balance Assessment   Sitting Balance (Static) Good   Sitting Balance (Dynamic) Good   Standing Balance (Static) Good   Standing Balance (Dynamic) Fair +   Weight Shift Sitting Good   Weight Shift Standing Fair   Comments FWW   Bed Mobility    Comments see PT eval- pt seen for OT after ambulation/PT eval. Pt UIC when approached.   ADL Assessment   Eating Independent   Grooming   (NT; completed this am prior to TKA)   Upper Body Dressing Independent   Lower Body Dressing Standby Assist   Toileting   (NT; pt states already had been to br with Sup)   Comments pt may benefit from a reacher at home- uses one today for doffing socks.pt shows flexibility for seated dressing of pants   How much help from another person does the patient currently need...   Putting on and taking off regular lower body clothing? 4   Bathing (including washing, rinsing, and drying)? 3   Toileting, which includes using a toilet, bedpan, or urinal? 4   Putting on and taking off regular upper body clothing? 4   Taking care of personal grooming such as brushing teeth? 4   Eating meals? 4   6 Clicks Daily Activity Score 23   Functional Mobility   Sit to Stand Standby Assist  "  Bed, Chair, Wheelchair Transfer Standby Assist   Toilet Transfers   (in br prior to session)   Transfer Method Stand Step   Visual Perception   Visual Perception  WDL   Comments functional   Activity Tolerance   Sitting in Chair yes   Sitting Edge of Bed 5  (edge of chair)   Standing 3x1\"     "

## 2022-07-05 ENCOUNTER — PHYSICAL THERAPY (OUTPATIENT)
Dept: PHYSICAL THERAPY | Facility: REHABILITATION | Age: 77
End: 2022-07-05
Attending: ORTHOPAEDIC SURGERY
Payer: MEDICARE

## 2022-07-05 DIAGNOSIS — M17.12 ARTHRITIS OF LEFT KNEE: ICD-10-CM

## 2022-07-05 PROCEDURE — 97162 PT EVAL MOD COMPLEX 30 MIN: CPT

## 2022-07-05 PROCEDURE — 97161 PT EVAL LOW COMPLEX 20 MIN: CPT

## 2022-07-05 PROCEDURE — 97110 THERAPEUTIC EXERCISES: CPT

## 2022-07-05 ASSESSMENT — ENCOUNTER SYMPTOMS
PAIN TIMING: ALL DAY
PAIN SCALE: 6
ALLEVIATING FACTORS: COLD/ICE
EXACERBATED BY: BENDING
ALLEVIATING FACTORS: HEAT APPLICATION
QUALITY: ACHING

## 2022-07-05 ASSESSMENT — ACTIVITIES OF DAILY LIVING (ADL)
AMBULATES (FT): 100
AMBULATION_WITH_ASSISTIVE_DEVICE: INDEPENDENT

## 2022-07-05 NOTE — OP THERAPY EVALUATION
"  Outpatient Physical Therapy  INITIAL EVALUATION    [facility] Physical Therapy [address]  [address]  [place] [address]  Phone:  [phone]  Fax:  [phone]    Date of Evaluation: 07/05/2022    Patient: Giselle Ramon  YOB: 1945  MRN: [id number]     Referring Provider: Nj Ortiz M.D.  [address] [place] [address]   Referring Diagnosis Unilateral primary osteoarthritis, left knee [M17.12]     Time Calculation  Start time: 1030  Stop time: 1110 Time Calculation (min): 40 minutes         Chief Complaint: Knee Problem    Visit Diagnoses     ICD-10-CM   1. Arthritis of left knee  M17.12       Date of onset of impairment: 6/27/2022    Subjective:   History of Present Illness:     Date of surgery:  6/27/2022    Mechanism of injury:  76 y.o. female presents with long history of left knee pain.  Underwent L TKA on 6/27/22.  Pre-op ortho notes state \"Her left knee has a valgus deformity that does not fully correct to neutral.  She has pain along the course of her MCL.  She has pain with patellofemoral grind and palpation of her tibiofemoral joint line.  Her range of motion is 10 to 110 degrees and forced flexion causes pain that does recreate her chief complaint\".  Since surgery, pt has been alternating tramadol and tylenol. Yesterday tried no tramadol, but did not sleep well last night.  Using ice and heat as needed.  Doing HEP 2-3 times a day.  Can do ankle pumps, quad sets, and seated heel slides.  Can not do SLR.  Has been sleeping in recliner because she can't get comfortable in bed.  Partially due to back and L sciatic pain.    Since surgery, has been using FWW most of the time, but has walked away from it at home occasionally.  No AD prior to surgery.  Pt reports numbness lateral knee since surgery.  Pt using raised toilet seat with arms, and a shower bench.  Prior to TKA, independent with gait, shopping, cleaning and cooking.  Pt reports chronic hx of L " "sciatic pain and L Achilles pain.  Pt's daughter, Nicol, present for today's evaluation.    Pain:     Current pain ratin    Location:  Left knee, posterior knee and thigh.    Quality:  Aching    Pain timing:  All day    Relieving factors:  Cold/ice and heat application    Aggravating factors:  Bending  Social Support:     Lives in:  One-story house    Lives with:  Alone  Patient Goals:     Patient goals for therapy:  Decreased pain, increased strength, increased motion, independence with ADLs/IADLs and decreased edema      Past Medical History:   Diagnosis Date   • Anesthesia     PONV   • Arrhythmia     Arrythmia   • Arthritis     Knees, thumbs, back   • Blood clotting disorder (HCC)     Left leg X 3- superficial, no DVT's    • Elevated blood sugar     Per patient: 2021 during a check up with MD.    • Heart burn    • Hypertension     \"Borderline\"    • Indigestion    • Pain 2014    both wrists hurt with activity-numb   • PONV (postoperative nausea and vomiting)    • Unspecified cataract     Bilateral IOL's     Past Surgical History:   Procedure Laterality Date   • PB TOTAL KNEE ARTHROPLASTY Left 2022    Procedure: ARTHROPLASTY, KNEE, TOTAL;  Surgeon: Nj Ortiz M.D.;  Location: SURGERY [facility];  Service: Orthopedics   • KS NASAL SCOPY,RMV TISS MAXILL SINUS Left 2021    Procedure: ENDOSCOPY, PARANASAL SINUS, WITH TOTAL ETHMOIDECTOMY, SPHENOIDOTOMY, MAXILLARY ANTROSTOMY, SPHEN+MAX SIN TISS REM, AND EXPLORATION FRONT SIN - ANTERIOR ETHMOIDECTOMY;  Surgeon: Yobany Lugo M.D.;  Location: SURGERY SAME DAY [facility];  Service: Ent   • CARPAL TUNNEL RELEASE  2014    Performed by Herson Hedrick M.D. at SURGERY [facility]   • CARPAL TUNNEL RELEASE  2014    Performed by Herson Hedrick M.D. at SURGERY [facility]   • OTHER      Bilateral IOL's   • GYN SURGERY      Hysterectomy   • OTHER Left     Vein ligation-  Leftleg   • CARPAL TUNNEL RELEASE     • " APPENDECTOMY     • OTHER Left Around     Vein ablasion left leg   • TUBAL LIGATION       Social History     Tobacco Use   • Smoking status: Former Smoker     Types: Cigarettes     Start date:      Quit date:      Years since quittin.5   • Smokeless tobacco: Never Used   Substance Use Topics   • Alcohol use: No     Family and Occupational History     Socioeconomic History   • Marital status:      Spouse name: Not on file   • Number of children: Not on file   • Years of education: Not on file   • Highest education level: Not on file   Occupational History   • Not on file       Objective     Observations   Left Knee   Positive for edema and trophic changes.     Palpation   Left   Tenderness of the distal biceps femoris, distal semimembranosus, distal semitendinosus, lateral gastrocnemius and medial gastrocnemius.     Active Range of Motion   Left Knee   Flexion: 78 degrees   Extension: -7 degrees     Passive Range of Motion   Left Knee   Flexion: 83 degrees     Strength:      Left Knee   Flexion: 3  Extension: 3+  Quadriceps contraction: fair    Left Ankle/Foot   Dorsiflexion: 4+ (mild pain)  Plantar flexion: 5    Swelling     Left Knee Girth Measurement (cm)   Joint line: 54.4 (with drain lines) cm  5 cm above joint line: 57.5 cm  5 cm below joint line: 54.9 cm  Ambulation   Weight-Bearing Status   Weight-Bearing Status (Left): weight-bearing as tolerated   Distance in feet: 100  Assistive device used: front-wheeled walker    Ambulation: Level Surfaces   Ambulation with assistive device: independent    Observational Gait   Gait: antalgic   Decreased walking speed.   Left foot contact pattern: heel to toe  Right foot contact pattern: heel to toe    Quality of Movement During Gait   Trunk  Forward lean.         Therapeutic Exercises (CPT 36576):     1. Bridges    2. LAQ    3. Supine hip abd/add slide    4. AP weight shift in stride stance    5. Standing L hip x3 taps      Therapeutic Exercise  Summary: Pt performed these exercises with instruction and SPV.  Provided handout with these exercises for daily HEP.      Time-based treatments/modalities:    Physical Therapy Timed Treatment Charges  Therapeutic exercise minutes (CPT 00154): 8 minutes      Assessment, Response and Plan:   Impairments: abnormal gait, abnormal or restricted ROM, impaired physical strength, lacks appropriate home exercise program, pain with function and swelling    Assessment details:  76 y.o. female presents s/p L TKA.  Pt demonstrates limited L knee AROM, decreased functional mobility and activity tolerance due to post-op pain and swelling.  Pt will benefit from skilled PT services to improve joint mobility, LLE strength, and increase independence and safety with all functional mobility and ADLs.  Prognosis: good    Goals:   Short Term Goals:   1. L knee ext AROM= 0 for improved gait pattern and comfort in bed.  2. Pt able to amb with cane in home mod I.  3. Pt will be independent with daily HEP to address limitations in ROM and strength to improve ADLs and safety in the home.  Short term goal time span:  2-4 weeks      Long Term Goals:    1. L knee AROM 0deg ext and 100 deg flex for safe and independent functional mobility.  2. Pt demo symmetric gait pattern with or without cane in community.  3. Pt able to perform 5 times STS without UE use within age based norms.  4. Pt will report increased function via improved scores on WOMAC.  Long term goal time span:  2-4 months    Plan:   Therapy options:  Physical therapy treatment to continue  Planned therapy interventions:  E Stim Unattended (CPT 08993), Gait Training (CPT 95679), Manual Therapy (CPT 73079), Therapeutic Activities (CPT 75612) and Therapeutic Exercise (CPT 76682)  Frequency:  2x week  Duration in visits:  16  Discussed with:  Patient      Functional Assessment Used  WOMAC Grand Total: 45.83     Referring provider co-signature:  I have reviewed this plan of care and my  co-signature certifies the need for services.    Certification Period: 07/05/2022 to  08/30/22    Physician Signature: ________________________________ Date: ______________

## 2022-07-08 ENCOUNTER — PHYSICAL THERAPY (OUTPATIENT)
Dept: PHYSICAL THERAPY | Facility: REHABILITATION | Age: 77
End: 2022-07-08
Attending: ORTHOPAEDIC SURGERY
Payer: MEDICARE

## 2022-07-08 DIAGNOSIS — M17.12 ARTHRITIS OF LEFT KNEE: ICD-10-CM

## 2022-07-08 PROCEDURE — 97110 THERAPEUTIC EXERCISES: CPT

## 2022-07-08 PROCEDURE — 97140 MANUAL THERAPY 1/> REGIONS: CPT

## 2022-07-08 NOTE — OP THERAPY DAILY TREATMENT
Outpatient Physical Therapy  DAILY TREATMENT     [facility] Physical [address]  [address]  [place] [address]  Phone:  [phone]  Fax:  [phone]    Date: 07/08/2022    Patient: Giselle Ramon  YOB: 1945  MRN: [id number]     Time Calculation    Start time: 1435  Stop time: 1525 Time Calculation (min): 50 minutes         Chief Complaint: Knee Problem, Post-Op Pain, and Difficulty Walking    Visit #: 2    SUBJECTIVE:  Patient reports no new complains. Having pain with standing and bending exercise previously provided as L knee feels like it is going to give out. She continues to have issues with LLE swelling; was not provided with compression stockings.     OBJECTIVE:  Current objective measures:   Passive Range of Motion   Left Knee   Flexion: 85 degrees   Extension: -7 degrees    Active Range of Motion   Left Knee   Flexion: 100 degrees   Extension: -4 degrees             Therapeutic Exercises (CPT 83989):     1. NuStep , L1 x 5 minutes, LEs only     2. Heel slides/prolonged knee extension stretch, Verbal review as patient already performing    3. QS -> SLR , 2 x 5 , Extensor lag, added to HEP     4. Hip abduction , 2 x 10 , Added to HEP     5. Isometric HSS , 2 x 10, 3 second holds, Added to HEP       Therapeutic Exercise Summary: HEP: heel slides, knee extension stretch, SLR, bridges, LAQ, sidelying hip abduction, AP weight shifting in stride stance    Therapeutic Treatments and Modalities:     1. Manual Therapy (CPT 43683), L knee joint mobilizations to improve flexion, patellar glides , See ROM improvements above.     2. Gait Training (CPT 75040), Gait with SPC on R , Cues for sequencing, gaze, and weight shifting    Therapeutic Treatment and Modalities Summary: Education provided to patient that she can transition to SPC due to good gait mechanics. However, patient does not own a cane and is slightly fearful.     Time-based treatments/modalities:    Physical  Therapy Timed Treatment Charges  Gait training minutes (CPT 24330): 5 minutes  Manual therapy minutes (CPT 36965): 10 minutes  Therapeutic exercise minutes (CPT 04697): 35 minutes      ASSESSMENT:   Response to treatment: Patient made good improvements in PROM of L knee with manual techniques. Subjectively reported decrease in pain following manual techniques, likely due to decrease in swelling. Demonstrated improve quad activation allowing for SLR completion with extensor lag noted once fatigued. Able to tolerate strengthening and gait progressions well.     PLAN/RECOMMENDATIONS:   Plan for treatment: therapy treatment to continue next visit.  Planned interventions for next visit: continue with current treatment.

## 2022-07-11 ENCOUNTER — PHYSICAL THERAPY (OUTPATIENT)
Dept: PHYSICAL THERAPY | Facility: REHABILITATION | Age: 77
End: 2022-07-11
Attending: ORTHOPAEDIC SURGERY
Payer: MEDICARE

## 2022-07-11 DIAGNOSIS — M17.12 ARTHRITIS OF LEFT KNEE: ICD-10-CM

## 2022-07-11 PROCEDURE — 97140 MANUAL THERAPY 1/> REGIONS: CPT

## 2022-07-11 PROCEDURE — 97110 THERAPEUTIC EXERCISES: CPT

## 2022-07-11 NOTE — OP THERAPY DAILY TREATMENT
Outpatient Physical Therapy  DAILY TREATMENT     [facility] Physical [address].  [address]  [place] [address]  Phone:  [phone]  Fax:  [phone]    Date: 07/11/2022    Patient: Giselle Ramon  YOB: 1945  MRN: [id number]     Time Calculation    Start time: 1305  Stop time: 1345 Time Calculation (min): 40 minutes         Chief Complaint: Knee Problem and Post-Op Pain    Visit #: 8    SUBJECTIVE:  Pt reports constant ache in left ilium and posterior lateral leg, all the way to foot. Only position that relieves pain to some degree is all the way back in recliner.  Has been alternating heat and ice.  Using tramadol and tylenol and meloxicam and baby aspirin.  Couldn't sleep last night due to L LE pain.  States she felt ok after last PT visit.  Doing HEP about 2 times a day.  Does some exercises often, maybe just 1-2 reps, but frequently throughout the day.  Pt reports she still feels like L knee may give out sometimes, so feels more comfortable walking with walker vs cane.  Also, recently was too painful to walk with less than walker.    OBJECTIVE:  Supine L knee AROM: ext=-7, flex= 108.        Therapeutic Exercises (CPT 88242):     2. Seated heel slides with disc glider, x10    3. Supine ball roll LLE only, x10    4. Hooklying hip add squeeze, 5x 10 sec    5. Hooklying isometric clam with med band, 5x 10 sec    6. Glute set, 10 sec x5    7. Supine ankle pumps in leg extended, x10    8. Seated calf stretch with sheet, x30 sec x2    9. Seated leg press into dynadisc, 5 sec x10    10. Gait with FWW, 150 feet- antalgic, scoliosis, decreased L stance time.    11. Hooklying pelvic tilt, x6    12. Seated pelvic tilt, 5sec x5    13. Seated LAQ, x10      Therapeutic Exercise Summary: Add to HEP: seated or hooklying pelvic tilt, seated calf stretch with strap, hooklying heel slide with or without ball.  Handout provided.    Therapeutic Treatments and Modalities:     1. Manual  Therapy (CPT 85495), L tib-fem ant and post mobs, GrIII, in multiple angles.  STM L quad and ITB and distal HS.  PROM knee ext., Decreased hip/leg pain with STM at ITB.    Time-based treatments/modalities:    Physical Therapy Timed Treatment Charges  Manual therapy minutes (CPT 67004): 10 minutes  Therapeutic exercise minutes (CPT 60515): 30 minutes    ASSESSMENT:   Response to treatment: Pt presents with increased flexion ROM vs last week, and fair gait pattern following TKA 2 weeks ago.  Recent aggravation of L sciatic pain, likely secondary to compensatory strategies in gait and bed mobility.    PLAN/RECOMMENDATIONS:   Plan for treatment: therapy treatment to continue next visit.  Planned interventions for next visit: continue with current treatment.  Trial prone knee ROM?  Monitor sciatic symptoms and adjust HEP as needed.

## 2022-07-14 ENCOUNTER — PHYSICAL THERAPY (OUTPATIENT)
Dept: PHYSICAL THERAPY | Facility: REHABILITATION | Age: 77
End: 2022-07-14
Attending: ORTHOPAEDIC SURGERY
Payer: MEDICARE

## 2022-07-14 DIAGNOSIS — M17.12 ARTHRITIS OF LEFT KNEE: ICD-10-CM

## 2022-07-14 PROCEDURE — 97110 THERAPEUTIC EXERCISES: CPT

## 2022-07-14 PROCEDURE — 97140 MANUAL THERAPY 1/> REGIONS: CPT

## 2022-07-14 PROCEDURE — 97014 ELECTRIC STIMULATION THERAPY: CPT

## 2022-07-14 NOTE — OP THERAPY DAILY TREATMENT
Outpatient Physical Therapy  DAILY TREATMENT     [facility] Outpatient Physical Therapy [place] [address] [facility], [address]  [place] [address]  Phone:  [phone]    Date: 07/14/2022    Patient: Giselle Ramon  YOB: 1945  MRN: [id number]     Time Calculation    Start time: 1450  Stop time: 1552 Time Calculation (min): 62 minutes         Chief Complaint: Knee Problem and Post-Op Pain    Visit #: 4    SUBJECTIVE:  Continued left side sciatic and IT band pain.  Can't get comfortable or get any relief.  Heat helps a bit.  Tried lying on her stomach in bed, but no real relief.  Tried bridges in bed, but no traction to get good lift.  Still using tramadol and tylenol, ice and heat.  States she continues to do knee ROM often through the day, reports bending is getting better.  Walking in home, sometimes forget the walker or leaves it behind.  Pt reports, overall, not having much knee pain, but back/hip/leg pain is more bothersome.    OBJECTIVE:  Current objective measures: L knee AROM in supine: ext=-3, flex= 106.        Therapeutic Exercises (CPT 70249):     1. Sidelying ITB stretch, x1 min L    2. Sidelying L clams, x15    3. Supine ball roll LLE only, x15    4. Prone lying, x3 min    5. Prone HS curl, x15L    6. SAQ over ball, x15L    7. Hooklying alt R/L clams with med band, x15    8. Seated calf stretch with sheet, x30 sec x2    9. Bridges, x15    11. Standing L hip x3, x10 at //bars    12. Seated pelvic tilt, x10    14. Prone ankle pump in knee flex, 15      Therapeutic Exercise Summary: Gait with FWW 100feet mod I: mild antalgic.  Gait with  feet Tony: moderate sagittal plane movement and increased gait deviations.  Gait without AD 100feet SPV: increased lateral sway and deviations.  Recommend pt continue to use FWW for normalized gait pattern and to avoid exacerbation of back pain.  Add prone lying, prone knee flexion, sidelying ITB stretch and bridges to HEP, handout provided.  Advised pt to  try lying on stomach on bed with foam mat or pillows to provide more support to lumbar area.  Also try foam mat for bridges.    Therapeutic Treatments and Modalities:     1. Manual Therapy (CPT 59793), prone knee flex PROM, STM distal HS, STM ITB with soft FR, ant tib-fem mobs in ext GrIII    2. E Stim Unattended (CPT 13886), IFC and ice pack to left knee in supine, knee extended, x10 min, stopped at 10 min because pt reported back pain.  Trial in reclined or long sitting position next visit.    Time-based treatments/modalities:    Physical Therapy Timed Treatment Charges  Manual therapy minutes (CPT 58840): 10 minutes  Therapeutic exercise minutes (CPT 87141): 35 minutes    ASSESSMENT:   Response to treatment: Improved knee ext AROM and gait mechanics.  Back and hip pain responded well to prone activities and gentle neural and ROM exercises in neutral spine while in clinic.    PLAN/RECOMMENDATIONS:   Plan for treatment: therapy treatment to continue next visit.  Planned interventions for next visit: continue with current treatment.

## 2022-07-19 ENCOUNTER — PHYSICAL THERAPY (OUTPATIENT)
Dept: PHYSICAL THERAPY | Facility: REHABILITATION | Age: 77
End: 2022-07-19
Attending: ORTHOPAEDIC SURGERY
Payer: MEDICARE

## 2022-07-19 DIAGNOSIS — M17.12 ARTHRITIS OF LEFT KNEE: ICD-10-CM

## 2022-07-19 PROCEDURE — 97110 THERAPEUTIC EXERCISES: CPT

## 2022-07-19 PROCEDURE — 97014 ELECTRIC STIMULATION THERAPY: CPT

## 2022-07-19 PROCEDURE — 97140 MANUAL THERAPY 1/> REGIONS: CPT

## 2022-07-19 NOTE — OP THERAPY DAILY TREATMENT
Outpatient Physical Therapy  DAILY TREATMENT     [facility] Outpatient Physical Therapy [place] [address] [facility], [address]  [place] [address]  Phone:  [phone]    Date: 07/19/2022    Patient: Giselle Ramon  YOB: 1945  MRN: [id number]     Time Calculation    Start time: 1100  Stop time: 1145 Time Calculation (min): 45 minutes         Chief Complaint: No chief complaint on file.    Visit #: 9    SUBJECTIVE:  Sciatic pain is the main issue knee is feeling good not sleeping well because of the pain  Using fww instead of SPC . Sleeping on right and left side    OBJECTIVE:  Current objective measures: leftknee AROM: 0-100          Therapeutic Exercises (CPT 90309):     1. Nu step 10 min legs only L2.5    3. Supine ball roll LLE only, x 30    4. Prone over pillow repeated knee flexion/quad stretch with strap, x3 min    5. Prone HS curl, x15L    6. Long arc quad with green tband seated, 2 x 15    7. Hooklying alt R/L clams with med band, x15, NT    8. Seated calf stretch with sheet, x30 sec x2    9. Bridges on ball, x15 10 sec hold    10. Piriformis stretch supine knee flexed knee bent, x 3 left LE      Therapeutic Exercise Summary:  Gait without AD 100feet SPV: increased lateral leg pain with increased distance  Recommend pt continue to use FWW for normalized gait pattern and to avoid exacerbation of back pain.    Added prone repeated knee flexion over a pillow with strap overpressure  Resisted long arc quad with green t-band 3 x 10      Therapeutic Treatments and Modalities:     1. Manual Therapy (CPT 80491), prone knee flex PROM, STM distal HS, STM ITB with soft FR, ant tib-fem mobs in ext GrIII    2. E Stim Unattended (CPT 98321), IFC with MHP lumbar/left piriformisin hooklying    Time-based treatments/modalities:    Physical Therapy Timed Treatment Charges  Manual therapy minutes (CPT 38589): 10 minutes  Therapeutic exercise minutes (CPT 22954): 30 minutes  ASSESSMENT:   Response to treatment: left  sciatic pain continues to remain a barrier to progress with gait but is improving after receiving STM left piriformis and beginning stretching left piriformis. Left knee is progressing well with AROM and TKE with gait. Progress as tolerated next session    PLAN/RECOMMENDATIONS:   Plan for treatment: therapy treatment to continue next visit.  Planned interventions for next visit: continue with current treatment.

## 2022-07-22 ENCOUNTER — PHYSICAL THERAPY (OUTPATIENT)
Dept: PHYSICAL THERAPY | Facility: REHABILITATION | Age: 77
End: 2022-07-22
Attending: ORTHOPAEDIC SURGERY
Payer: MEDICARE

## 2022-07-22 DIAGNOSIS — M17.12 ARTHRITIS OF LEFT KNEE: ICD-10-CM

## 2022-07-22 PROCEDURE — 97110 THERAPEUTIC EXERCISES: CPT

## 2022-07-22 PROCEDURE — 97014 ELECTRIC STIMULATION THERAPY: CPT

## 2022-07-22 PROCEDURE — 97140 MANUAL THERAPY 1/> REGIONS: CPT

## 2022-07-22 NOTE — OP THERAPY DAILY TREATMENT
"  Outpatient Physical Therapy  DAILY TREATMENT     [facility] Physical [address].  [address]  [place] [address]  Phone:  [phone]  Fax:  [phone]    Date: 07/22/2022    Patient: Giselle Ramon  YOB: 1945  MRN: [id number]     Time Calculation    Start time: 1500  Stop time: 1610 Time Calculation (min): 70 minutes         Chief Complaint: Knee Problem    Visit #: 10    SUBJECTIVE:  Pt reports continued piriformis pain, mostly when lying down.  Salonpas patches help.  Finds herself walking around home without FWW, forgets where she left it.  Used cane for a day.  Got on floor on FR to try previous exercises/ rolling for back pain.  Was able to get to/from floor mod I.      OBJECTIVE:        Therapeutic Exercises (CPT 50221):     1. Nu step 5 min legs only L3    2. Prone quad sets, x10    3. Supine ball roll DKC , x10    4. Prone knee flexion/quad stretch with sheet, x2 min    5. Prone HS curl, x15L    8. Standing calf stretch, x30 sec x2, gastroc and soleus    9. Bridges, X15, cues for maintaining hips/ knees/ feet in hip width for good alignment    10. Piriformis stretch supine knee flexed knee bent, x 3 left LE    11. Prone contract relax flexion, x8    12. Lateral steps, with SPC x20 feet B    13. Tap to 4\" step, x10B with light BUE support    14. LTR with ball, x15    15. 4' step up, [id number], x10 lateral with light BUE support at //bars    16. Standing hip circles with disc glider, x10B CW/CCW      Therapeutic Exercise Summary: Recommend pt continue to use FWW for normalized gait pattern and to avoid exacerbation of back pain.  Told her she could begin weaning from walker to cane for short times.  Should be using FWW 75%, cane 25%.  Not advised to use cane if having back pain or if it exacerbates back pain.    Added prone repeated knee flexion over a pillow with strap overpressure  Resisted long arc quad with green t-band 3 x 10      Therapeutic Treatments and " Modalities:     1. Manual Therapy (CPT 61126), prone knee flex PROM, left patellar mobs all directions GrII, STM piriformis and ITB with soft FR, ant tib-fem mobs in ext GrIII, post tib-fem mobs in flex GrIII    2. E Stim Unattended (CPT 90098), IFC with MHP lumbar/left piriformis in hooklying, ice pack to L knee, x15 min    Time-based treatments/modalities:    Physical Therapy Timed Treatment Charges  Manual therapy minutes (CPT 11275): 15 minutes  Therapeutic exercise minutes (CPT 16389): 35 minutes      ASSESSMENT:   Response to treatment: Pt reports difficulty sleeping or getting comfortable due to L piriformis and leg pain, although does not report pain currently in PT session.  Recommend continued post-op protocol for knee and hip muscle strengthening for stability and safety with ADLs and functional mobility, as well as gait training.    PLAN/RECOMMENDATIONS:   Plan for treatment: therapy treatment to continue next visit.  Planned interventions for next visit: continue with current treatment.  Progress pre-gait/ gait training with decreasing UE support as tolerated.

## 2022-07-26 ENCOUNTER — PHYSICAL THERAPY (OUTPATIENT)
Dept: PHYSICAL THERAPY | Facility: REHABILITATION | Age: 77
End: 2022-07-26
Attending: ORTHOPAEDIC SURGERY
Payer: MEDICARE

## 2022-07-26 DIAGNOSIS — M17.12 ARTHRITIS OF LEFT KNEE: ICD-10-CM

## 2022-07-26 PROCEDURE — 97014 ELECTRIC STIMULATION THERAPY: CPT

## 2022-07-26 PROCEDURE — 97110 THERAPEUTIC EXERCISES: CPT

## 2022-07-26 PROCEDURE — 97140 MANUAL THERAPY 1/> REGIONS: CPT

## 2022-07-26 NOTE — OP THERAPY DAILY TREATMENT
Outpatient Physical Therapy  DAILY TREATMENT     [facility] Outpatient Physical Therapy [place] [address] [facility] [place], [address]  [place] [address]  Phone:  [phone]    Date: 07/26/2022    Patient: Giselle Ramon  YOB: 1945  MRN: [id number]     Time Calculation    Start time: 1345  Stop time: 1430 Time Calculation (min): 45 minutes         Chief Complaint: No chief complaint on file.    Visit #: 11    SUBJECTIVE:  Left sciatic pain is relieved with TENS unit.  Still present with excessive gait and weightbearing  3/10 VAS left sciatic pain.  Lying in bed is the most aggravating factor.      OBJECTIVE:  Current objective measures:           Therapeutic Exercises (CPT 87321):     1. Nu step L2.5 le only, 7 min    2. Bridge with ball, x 10 5 sec hold    3. Ball roll Bilateral, x 30    4. Heelslides with strap overpressure for AROM flexion, 2 x 10    5. Aarom sitting using contralateral leg, knee flex x 10    6. Prone hamstring curl, 2 x 10, 5lb weight    7. SLR supine and SL, 2 x 10 , 2lb weight    Therapeutic Treatments and Modalities:     1. Neuromuscular Re-education (CPT 25547), using cervical donald roll    2. Manual Therapy (CPT 11937), repeated knee flexion prone with clinician overpressure x 10, tibiofemoral joint mob into extension, p-f joint mobility in all planes    Time-based treatments/modalities:    Physical Therapy Timed Treatment Charges  Manual therapy minutes (CPT 88588): 8 minutes  Therapeutic exercise minutes (CPT 02540): 30 minutes    ASSESSMENT:   Response to treatment: patient found relief of sciatic pain with using a donald cervical rollunder waist with right sidelying and a pillow between legs.  Improved strength left quad and LE with loading.  Recommend progressing to standing therex /lquad and hip oading next session if patient can tolerate.     PLAN/RECOMMENDATIONS:   Plan for treatment: therapy treatment to continue next visit.  Planned interventions for next visit: continue  with current treatment.

## 2022-07-26 NOTE — OP THERAPY DAILY TREATMENT
"  Outpatient Physical Therapy  DAILY TREATMENT     [facility] Outpatient Physical Therapy [place] [address] [facility], [address]  [place] [address]  Phone:  [phone]    Date: 07/26/2022    Patient: Giselle Ramon  YOB: 1945  MRN: [id number]     Time Calculation                   Chief Complaint: No chief complaint on file.    Visit #: 11    SUBJECTIVE:  ***    OBJECTIVE:  Current objective measures: ***          Therapeutic Exercises (CPT 25099):     1. Nu step 5 min legs only L3    2. Prone quad sets, x10    3. Supine ball roll DKC , x10    4. Prone knee flexion/quad stretch with sheet, x2 min    5. Prone HS curl, x15L    8. Standing calf stretch, x30 sec x2, gastroc and soleus    9. Bridges, X15, cues for maintaining hips/ knees/ feet in hip width for good alignment    10. Piriformis stretch supine knee flexed knee bent, x 3 left LE    11. Prone contract relax flexion, x8    12. Lateral steps, with SPC x20 feet B    13. Tap to 4\" step, x10B with light BUE support    14. LTR with ball, x15    15. 4' step up, [id number], x10 lateral with light BUE support at //bars    16. Standing hip circles with disc glider, x10B CW/CCW      Therapeutic Exercise Summary: Recommend pt continue to use FWW for normalized gait pattern and to avoid exacerbation of back pain.  Told her she could begin weaning from walker to cane for short times.  Should be using FWW 75%, cane 25%.  Not advised to use cane if having back pain or if it exacerbates back pain.    Added prone repeated knee flexion over a pillow with strap overpressure  Resisted long arc quad with green t-band 3 x 10      Therapeutic Treatments and Modalities:     1. Manual Therapy (CPT 80035), prone knee flex PROM, left patellar mobs all directions GrII, STM piriformis and ITB with soft FR, ant tib-fem mobs in ext GrIII, post tib-fem mobs in flex GrIII    2. E Stim Unattended (CPT 98310), IFC with MHP lumbar/left piriformis in hooklying, ice pack to L knee, x15 " "min    Time-based treatments/modalities:           Pain rating (1-10) before treatment:  {PAIN NUMBERS_1-10:20705}  Pain rating (1-10) after treatment:  {PAIN NUMBERS_1-10:30031}    ASSESSMENT:   Response to treatment: ***    PLAN/RECOMMENDATIONS:   Plan for treatment: {AMB OP THERAPY - THERAPY PLAN:699265092::\"therapy treatment to continue next visit\"}.  Planned interventions for next visit: {PT PLANNED THERAPY INTERVENTIONS:[id number]::\"continue with current treatment\"}.       "

## 2022-07-29 ENCOUNTER — PHYSICAL THERAPY (OUTPATIENT)
Dept: PHYSICAL THERAPY | Facility: REHABILITATION | Age: 77
End: 2022-07-29
Attending: ORTHOPAEDIC SURGERY
Payer: MEDICARE

## 2022-07-29 DIAGNOSIS — M17.12 ARTHRITIS OF LEFT KNEE: ICD-10-CM

## 2022-07-29 PROCEDURE — 97110 THERAPEUTIC EXERCISES: CPT

## 2022-07-29 PROCEDURE — 97014 ELECTRIC STIMULATION THERAPY: CPT

## 2022-07-29 NOTE — OP THERAPY DAILY TREATMENT
Outpatient Physical Therapy  DAILY TREATMENT     [facility] Physical [address]  [address]  [place] [address]  Phone:  [phone]  Fax:  [phone]    Date: 07/29/2022    Patient: Giselle Ramon  YOB: 1945  MRN: [id number]     Time Calculation    Start time: 1400  Stop time: 1510 Time Calculation (min): 70 minutes         Chief Complaint: Knee Problem    Visit #: 8    SUBJECTIVE:  Only sleeping about 3 hours due to left leg pain.  Hip and thigh and/or lateral calf and plantar surface of foot.  Calf and foot pain most often when lying down.  States she did SLR and sidelying hip abd with ankle weights last visit.  Felt fine at the time, but when she performed at home with 1.5# ankle weights, it immediately exacerbated L sciatic/ hip pain.  Tried a couple days later, but had the same result.  Also notes that her R hip is higher than her left.  States this has happened in the past.    OBJECTIVE:  Current objective measures: Supine L knee AROM: ext= lacks 1 deg, flex= 130 deg.        Therapeutic Exercises (CPT 49508):     1. Nu step L3 LEs only, 5 min    2. Bridge with ball, x 10 5 sec hold    3. DKC with ball, x15    4. Supine SLR with ER for VMO focus, x10B , mild L hip/ posterior leg pain with ecc lowering.  recommend smaller ROM.    5. Standing hip circles with disc glider, x10B CW/CCW    6. Prone hamstring curl, NT    7. Standing hip ext with med light pink band, x10B    8. Standing hip abd with med-light pink band, x10B    9. Jamie stretch, left x1 min, with knee flex/ext AROM x10    10. Hooklying VALENTIN stretch, x30 sec B    11. Hooklying piriformis stretch, x30 sec B    12. Long sitting, calf stretch with strap x1 min, B ankle windshield wipers x10    13. Short sitting, calf stretch with strap x1 min, B ankle windshield wipers x10    15. Gait with FWW- raised walker height for improved hip and spine neutral position    16. Gait with FWW with heavy resistance band  around ASISs for faciltate active hip and spine extension and anterior translation of pelvis over LEs., x150 feet      Therapeutic Exercise Summary: Add calf stretch with strap and ankle windshield wipers (long sitting and short sitting), Jamie stretch and SLR with VMO focus to HEP, handout provided.    Therapeutic Treatments and Modalities:     1. E Stim Unattended (CPT 41154), IFC to low back with MH, ice pack to left knee, in supine, x15 min    2. Manual Therapy (CPT 12376), tibiofemoral joint mob into extension GrIII, p-f joint mobility in all planes, short sitting tib-fem distraction and posterior tib-fem mobs with passive flexion    Time-based treatments/modalities:    Physical Therapy Timed Treatment Charges  Manual therapy minutes (CPT 49022): 10 minutes  Therapeutic exercise minutes (CPT 35527): 40 minutes    ASSESSMENT:   Response to treatment: Good progression of AROM and strength at knee following TKA. Limited activity tolerance due to back pain, and likely sciatic/ sural nerve irritation and tension.    PLAN/RECOMMENDATIONS:   Plan for treatment: therapy treatment to continue next visit.  Planned interventions for next visit: continue with current treatment.  Progress gait training for normalized gait pattern and decreased compensations.  Core strengthening to address contributing factors (spine and hip dysfunction).

## 2022-08-01 ENCOUNTER — PHYSICAL THERAPY (OUTPATIENT)
Dept: PHYSICAL THERAPY | Facility: REHABILITATION | Age: 77
End: 2022-08-01
Attending: ORTHOPAEDIC SURGERY
Payer: MEDICARE

## 2022-08-01 DIAGNOSIS — M17.12 ARTHRITIS OF LEFT KNEE: ICD-10-CM

## 2022-08-01 PROCEDURE — 97140 MANUAL THERAPY 1/> REGIONS: CPT

## 2022-08-01 PROCEDURE — 97116 GAIT TRAINING THERAPY: CPT

## 2022-08-01 PROCEDURE — 97110 THERAPEUTIC EXERCISES: CPT

## 2022-08-01 NOTE — OP THERAPY DAILY TREATMENT
Outpatient Physical Therapy  DAILY TREATMENT     [facility] Outpatient Physical Therapy [place] [address] [place], [address]  [place] [address]  Phone:  [phone]    Date: 08/01/2022    Patient: Giselle Ramon  YOB: 1945  MRN: [id number]     Time Calculation    Start time: 1345  Stop time: 1445 Time Calculation (min): 60 minutes         Chief Complaint: No chief complaint on file.    Visit #: 9    SUBJECTIVE:  Leg pain is keeping her up all night //decreased  pain currently left LE.      OBJECTIVE:  Current objective measures:           Therapeutic Exercises (CPT 89355):     1. Lateral stepping in patrallel bars, 2 x 15 reps b    2. Shuttle 3 cords DL/2 cords SL, 2 x 20 each    3. Repeated prone knee flexion , x 20 B    4. Up and down 4 inch step in parallel bars , 2 x 15    Therapeutic Treatments and Modalities:     1. Gait Training (CPT 52606), level surfaces with SPC 2  x 150 feet     2. Manual Therapy (CPT 01015), extension mobilization  lumbar and left knee , STM left piriformis and left lateral sacral border    4. E Stim Unattended (CPT 82394), prone over pillow, IFC with MHP left glute and lumbar region x 15 min    Time-based treatments/modalities:    Physical Therapy Timed Treatment Charges  Gait training minutes (CPT 39647): 10 minutes  Manual therapy minutes (CPT 57666): 8 minutes  Therapeutic exercise minutes (CPT 04701): 20 minutes    ASSESSMENT:   Response to treatment: progressed standing there ex and gait with SPC with no leg pain throughout.  Focused on posture awareness with standing and walking. Patient encouraged to continue sciatic mobility at home as well as prone positioning 1-2 min several times throughout the day.  Left knee progressing well.  UPOC next session    PLAN/RECOMMENDATIONS:   Plan for treatment: therapy treatment to continue next visit.  Planned interventions for next visit: continue with current treatment.

## 2022-08-01 NOTE — OP THERAPY DAILY TREATMENT
Outpatient Physical Therapy  DAILY TREATMENT     [facility] Outpatient Physical Therapy [place] [address] [facility], [address]  [place] [address]  Phone:  [phone]    Date: 08/01/2022    Patient: Giselle Ramon  YOB: 1945  MRN: [id number]     Time Calculation                   Chief Complaint: No chief complaint on file.    Visit #: 9    SUBJECTIVE:  ***    OBJECTIVE:  Current objective measures: ***          Therapeutic Exercises (CPT 23845):     1. Nu step L3 LEs only, 5 min    2. Bridge with ball, x 10 5 sec hold    3. DKC with ball, x15    4. Supine SLR with ER for VMO focus, x10B , mild L hip/ posterior leg pain with ecc lowering.  recommend smaller ROM.    5. Standing hip circles with disc glider, x10B CW/CCW    6. Prone hamstring curl, NT    7. Standing hip ext with med light pink band, x10B    8. Standing hip abd with med-light pink band, x10B    9. Jamie stretch, left x1 min, with knee flex/ext AROM x10    10. Hooklying VALENTIN stretch, x30 sec B    11. Hooklying piriformis stretch, x30 sec B    12. Long sitting, calf stretch with strap x1 min, B ankle windshield wipers x10    13. Short sitting, calf stretch with strap x1 min, B ankle windshield wipers x10    15. Gait with FWW- raised walker height for improved hip and spine neutral position    16. Gait with FWW with heavy resistance band around ASISs for faciltate active hip and spine extension and anterior translation of pelvis over LEs., x150 feet      Therapeutic Exercise Summary: Add calf stretch with strap and ankle windshield wipers (long sitting and short sitting), Jamie stretch and SLR with VMO focus to HEP, handout provided.    Therapeutic Treatments and Modalities:     1. E Stim Unattended (CPT 58058), IFC to low back with MH, ice pack to left knee, in supine, x15 min    2. Manual Therapy (CPT 76801), tibiofemoral joint mob into extension GrIII, p-f joint mobility in all planes, short sitting tib-fem distraction and posterior tib-fem  "mobs with passive flexion    Time-based treatments/modalities:           Pain rating (1-10) before treatment:  {PAIN NUMBERS_1-10:27682}  Pain rating (1-10) after treatment:  {PAIN NUMBERS_1-10:34094}    ASSESSMENT:   Response to treatment: ***    PLAN/RECOMMENDATIONS:   Plan for treatment: {AMB OP THERAPY - THERAPY PLAN:648126979::\"therapy treatment to continue next visit\"}.  Planned interventions for next visit: {PT PLANNED THERAPY INTERVENTIONS:520330888::\"continue with current treatment\"}.       "

## 2022-08-04 ENCOUNTER — PHYSICAL THERAPY (OUTPATIENT)
Dept: PHYSICAL THERAPY | Facility: REHABILITATION | Age: 77
End: 2022-08-04
Attending: ORTHOPAEDIC SURGERY
Payer: MEDICARE

## 2022-08-04 DIAGNOSIS — M17.12 ARTHRITIS OF LEFT KNEE: ICD-10-CM

## 2022-08-04 PROCEDURE — 97140 MANUAL THERAPY 1/> REGIONS: CPT

## 2022-08-04 PROCEDURE — 97110 THERAPEUTIC EXERCISES: CPT

## 2022-08-04 NOTE — OP THERAPY DAILY TREATMENT
Outpatient Physical Therapy  DAILY TREATMENT     [facility] Outpatient Physical Therapy [address], [address]  [place] [address]  Phone:  [phone]    Date: 08/04/2022    Patient: Giselle Ramon  YOB: 1945  MRN: [id number]     Time Calculation    Start time: 1100  Stop time: 1150 Time Calculation (min): 50 minutes         Chief Complaint: No chief complaint on file.    Visit #: 10    SUBJECTIVE:  Did well after last session, sciatic pain is still present intermittently but has decreased in intensity.     OBJECTIVE:  Current objective measures: 4/5 left quad gait with SPC   Left knee 0-125          Therapeutic Exercises (CPT 16701):     1. Lateral stepping in patrallel bars, 2 x 15 reps b    2. Shuttle 3 cords DL/2 cords SL, 2 x 20 each    3. Repeated prone knee flexion , x 20 B    4. Up and down 4 inch step in parallel bars , 2 x 15    5. Ball bridge, x 20    Therapeutic Treatments and Modalities:     1. Gait Training (CPT 47972), level surfaces with SPC 2  x 300 feet     2. Manual Therapy (CPT 08039), lateral quad glute medius IASTM, STM left piriformis and left lateral sacral border    4. E Stim Unattended (CPT 37694)    Time-based treatments/modalities:    Physical Therapy Timed Treatment Charges  Gait training minutes (CPT 06804): 10 minutes  Manual therapy minutes (CPT 35701): 10 minutes  Therapeutic exercise minutes (CPT 07383): 30 minutes    ASSESSMENT:   Response to treatment: UPOC completed, transitioned gait training with SPC vs fww.  Patient demonstrating improved upright posture and tolerance to standing and walking without sciatic pain.  Continue to progress quad and core strength and gait training as tolerated     PLAN/RECOMMENDATIONS:   Plan for treatment: therapy treatment to continue next visit.  Planned interventions for next visit: continue with current treatment.

## 2022-08-04 NOTE — OP THERAPY PROGRESS SUMMARY
Outpatient Physical Therapy  PROGRESS SUMMARY NOTE      [facility] [place] [id number] [facility], [address]  [place] [address]  Phone:  [phone]    Date of Visit: 08/04/2022    Patient: Giselle Ramon  YOB: 1945  MRN: [id number]     Referring Provider: Nj Ortiz M.D.  [address] [place] [address]   Referring Diagnosis Unilateral primary osteoarthritis, left knee [M17.12]     Visit Diagnoses     ICD-10-CM   1. Arthritis of left knee  M17.12       Rehab Potential: excellent    Progress Report Period: 8/4/2022    Functional Assessment Used          Objective Findings and Assessment:   Patient progression towards goals: Short Term Goals:   1. L knee ext AROM= 0 for improved gait pattern and comfort in bed. MET  2. Pt able to amb with cane in home mod I. MET  3. Pt will be independent with daily HEP to address limitations in ROM and strength to improve ADLs and safety in the home. MET        Long Term Goals:    1. L knee AROM 0deg ext and 100 deg flex for safe and independent functional mobility. MET  2. Pt demo symmetric gait pattern with or without cane in community. Partially MET  3. Pt able to perform 5 times STS without UE use within age based norms. TBA  4. Pt will report increased function via improved scores on WOMAC. TBA    Objective findings and assessment details: Gait :  feet+ normal reciprical gait and good quad control TKE.  Uses FWW occasionally to decrease load on lumbar spine/ease sciatic pain LLE  4/5 quad strength left  0-125 degree AROM left knee  Patient is progressing well S/P left knee TKA.  Main barrior to progress has been chronic left sciatic pain but this seems to be easing as patient becomes stronger and more mobile.  Recommend continued PT to meet goals stated below.       Goals:   Short Term Goals:   Patient able to walk community distances with or without SPC with >50% decreased left leg symptoms  Ambulate household distance  without assistive device with normal gait pattern  Normal left quad strength 5/5        Long Term Goals:    Patient able to walk >1/4 mile without assistive device with >75% decreased left LE pain  WOMAC < 25% perceived disability  Long term goal time span:  6-8 weeks    Plan:   Planned therapy interventions:  Neuromuscular Re-education (CPT 29676), Manual Therapy (CPT 70973), Gait Training (CPT 10889), E Stim Unattended (CPT 47710) and Therapeutic Exercise (CPT 81167)  Frequency:  2x week  Duration in weeks:  8      Referring provider co-signature:  I have reviewed this plan of care and my co-signature certifies the need for services.     Certification Period: 08/04/2022 to 09/29/22    Physician Signature: ________________________________ Date: ______________

## 2022-08-08 ENCOUNTER — PHYSICAL THERAPY (OUTPATIENT)
Dept: PHYSICAL THERAPY | Facility: REHABILITATION | Age: 77
End: 2022-08-08
Attending: ORTHOPAEDIC SURGERY
Payer: MEDICARE

## 2022-08-08 DIAGNOSIS — M17.12 ARTHRITIS OF LEFT KNEE: ICD-10-CM

## 2022-08-08 PROCEDURE — 97140 MANUAL THERAPY 1/> REGIONS: CPT

## 2022-08-08 PROCEDURE — 97110 THERAPEUTIC EXERCISES: CPT

## 2022-08-08 NOTE — OP THERAPY DAILY TREATMENT
Outpatient Physical Therapy  DAILY TREATMENT     [facility] Physical Therapy [address] [facility], [address]  [place] [address]  Phone:  [phone]    Date: 08/08/2022    Patient: Giselle Ramon  YOB: 1945  MRN: [id number]     Time Calculation    Start time: 1345  Stop time: 1430 Time Calculation (min): 45 minutes         Chief Complaint: No chief complaint on file.    Visit #: 11    SUBJECTIVE:  Walking more with cane leg pain is less irritable to left leg/back symptoms.  ordered adjustable cane-using walker until it arrives    OBJECTIVE:  Current objective measures: VL and biceps femoris soft tissue restriction and nerve hypersensistivity          Therapeutic Exercises (CPT 18099):     1. Lateral stepping in patrallel bars, 2 x 15 reps b    2. Shuttle 3 cords DL/2 cords SL, 2 x 20 each    3. Repeated prone knee flexion , x 20 B    5. Ball bridge, x 20    6. Nu step L4,  10min     Therapeutic Treatments and Modalities:     1. Gait Training (CPT 08164), level surfaces with SPC 2  x 300 feet     2. Manual Therapy (CPT 42412), lateral quad glute medius IASTM, STM left piriformis and left lateral sacral border    4. E Stim Unattended (CPT 47590), IFC with MHP left lumbar paraspinals x 15 min    Time-based treatments/modalities:    Physical Therapy Timed Treatment Charges  Manual therapy minutes (CPT 20252): 10 minutes  Therapeutic exercise minutes (CPT 66636): 30 minutes  ASSESSMENT:   Response to treatment: discharged the side leg raises-using standing resisted lateral stepping . Responding well to sciatic nerve mobility and manual work/IASTM lateral quad, biceps femoris and ITB.    PLAN/RECOMMENDATIONS:   Plan for treatment: therapy treatment to continue next visit.  Planned interventions for next visit: continue with current treatment.

## 2022-08-11 ENCOUNTER — PHYSICAL THERAPY (OUTPATIENT)
Dept: PHYSICAL THERAPY | Facility: REHABILITATION | Age: 77
End: 2022-08-11
Attending: ORTHOPAEDIC SURGERY
Payer: MEDICARE

## 2022-08-11 DIAGNOSIS — M17.12 ARTHRITIS OF LEFT KNEE: ICD-10-CM

## 2022-08-11 PROCEDURE — 97140 MANUAL THERAPY 1/> REGIONS: CPT

## 2022-08-11 PROCEDURE — 97014 ELECTRIC STIMULATION THERAPY: CPT

## 2022-08-11 PROCEDURE — 97110 THERAPEUTIC EXERCISES: CPT

## 2022-08-11 NOTE — OP THERAPY DAILY TREATMENT
Outpatient Physical Therapy  DAILY TREATMENT     [facility] Outpatient Physical Therapy [address] [facility], [address]  [place] [address]  Phone:  [phone]    Date: 08/11/2022    Patient: Giselle Ramon  YOB: 1945  MRN: [id number]     Time Calculation    Start time: 1345  Stop time: 1430 Time Calculation (min): 45 minutes         Chief Complaint: No chief complaint on file.    Visit #: 12    SUBJECTIVE:  Decreased sciatic pain low back feels weak     OBJECTIVE:  Current objective measures: poor postural endurance/ stability in standing           Therapeutic Exercises (CPT 34647):     1. lateral stepping in patrallel bars, 2 x 15 reps b, NT    2. shuttle 3 cords DL/2 cords SL, 2 x 20 each, NT    5. ball bridge, x 20    6. nu step L4,  10min     7. step ups 4  inch step 6 inch, 2 x 15    8. bridge 2 x 10 2 x30 sec    10. row, green 3 x 10    11. shoulder ext, green 3 x 10    12. heel raises standing, x 20    13. sit to stand, x 10 hip hinge    Therapeutic Treatments and Modalities:     1. Gait Training (CPT 41106), level surfaces with SPC 2  x 300 feet , adjusted new SPC to correct height    2. Manual Therapy (CPT 29215), lateral quad Tib anterior and peroneus longusglute medius  IASTM    4. E Stim Unattended (CPT 77083), Russian glute medius, VL continuous x 15 min with MHP left lateral thigh and glute supine  Time-based treatments/modalities:    Physical Therapy Timed Treatment Charges  Gait training minutes (CPT 25190): 10 minutes  Manual therapy minutes (CPT 64302): 10 minutes  Therapeutic exercise minutes (CPT 94303): 30 minutes      ASSESSMENT:   Response to treatment: able to progress LE loading and there ex with minimal left leg symptoms and decreased sensitivity left sciatic nerve post manual work.      PLAN/RECOMMENDATIONS:   Plan for treatment: therapy treatment to continue next visit.  Planned interventions for next visit: continue with current treatment.

## 2022-08-15 ENCOUNTER — PHYSICAL THERAPY (OUTPATIENT)
Dept: PHYSICAL THERAPY | Facility: REHABILITATION | Age: 77
End: 2022-08-15
Attending: ORTHOPAEDIC SURGERY
Payer: MEDICARE

## 2022-08-15 DIAGNOSIS — M17.12 ARTHRITIS OF LEFT KNEE: ICD-10-CM

## 2022-08-15 PROCEDURE — 97140 MANUAL THERAPY 1/> REGIONS: CPT

## 2022-08-15 PROCEDURE — 97110 THERAPEUTIC EXERCISES: CPT

## 2022-08-15 NOTE — OP THERAPY DAILY TREATMENT
Outpatient Physical Therapy  DAILY TREATMENT     [facility] Outpatient Physical Therapy [place] [address] [facility], [address]  [place] [address]  Phone:  [phone]    Date: 08/15/2022    Patient: Giselle Ramon  YOB: 1945  MRN: [id number]     Time Calculation    Start time: 1340  Stop time: 1435 Time Calculation (min): 55 minutes         Chief Complaint: No chief complaint on file.    Visit #: 13    SUBJECTIVE: feeling much better /sleeping better-still waking up every 3 hours switching positions.  Back fatigue is limiting factor-not knee.  Not using cane very much in the house      OBJECTIVE:  Current objective measures: 0-125 knee AROM supine          Therapeutic Exercises (CPT 57758):     1. lateral stepping in patrallel bars, 2 x 15 reps b, NT    5. ball bridge, x 20    6. nu step L4. 5,  10min     7. step ups 6  inch step, 2 x 1 b    8. bridge 2 x 10 2 x30 sec    10. row, green 3 x 10, NT    11. shoulder ext, green 3 x 10, NT    12. heel raises standing, x 20    13. sit to stand, x 10 hip hinge    Therapeutic Treatments and Modalities:     1. Gait Training (CPT 15493), level surfaces with SPC 2  x 300 feet , adjusted new SPC to correct height    2. Manual Therapy (CPT 47828), lateral quad Tib anterior and peroneus longusglute medius  IASTM    4. E Stim Unattended (CPT 92026), Russian glute medius, VL continuous x 15 min with MHP left lateral thigh and glute supine  Time-based treatments/modalities:    Physical Therapy Timed Treatment Charges  Gait training minutes (CPT 23774): 10 minutes  Manual therapy minutes (CPT 36091): 15 minutes  Therapeutic exercise minutes (CPT 80956): 20 minutes    ASSESSMENT:   Response to treatment: Patient continues to improve functionally, walking without SPC most of the time at home -back still fatigues but is getting stronger.  Still has left lateral  nerve sensitivity over distal biceps tendon but improving with IASTM and nerve mobility work.  Progress standing  functional strength and knee AROM next session    PLAN/RECOMMENDATIONS:   Plan for treatment: therapy treatment to continue next visit.  Planned interventions for next visit: continue with current treatment.

## 2022-08-15 NOTE — OP THERAPY PROGRESS SUMMARY
Outpatient Physical Therapy  {PROGRESS/RE-EVAL:[id number]} NOTE      [facility] [place] [address] [facility], [address]  [place] [address]  Phone:  [phone]    Date of Visit: 08/15/2022    Patient: Giselle Ramon  YOB: 1945  MRN: [id number]     Referring Provider: Nj Ortiz M.D.  [address] [place] [address]   Referring Diagnosis Unilateral primary osteoarthritis, left knee [M17.12]     Visit Diagnoses     ICD-10-CM   1. Arthritis of left knee  M17.12       Rehab Potential: {excellent/good/fair/poor:81315}    Progress Report Period: ***    Functional Assessment Used        Progress Summary Details    Referring provider co-signature:  I have reviewed this plan of care and my co-signature certifies the need for services.     Certification Period: 08/15/2022 to {Future Date:24418}    Physician Signature: ________________________________ Date: ______________

## 2022-08-17 ENCOUNTER — TELEPHONE (OUTPATIENT)
Dept: HEALTH INFORMATION MANAGEMENT | Facility: OTHER | Age: 77
End: 2022-08-17

## 2022-08-18 ENCOUNTER — PHYSICAL THERAPY (OUTPATIENT)
Dept: PHYSICAL THERAPY | Facility: REHABILITATION | Age: 77
End: 2022-08-18
Attending: ORTHOPAEDIC SURGERY
Payer: MEDICARE

## 2022-08-18 DIAGNOSIS — M17.12 ARTHRITIS OF LEFT KNEE: ICD-10-CM

## 2022-08-18 PROCEDURE — 97140 MANUAL THERAPY 1/> REGIONS: CPT

## 2022-08-18 PROCEDURE — 97110 THERAPEUTIC EXERCISES: CPT

## 2022-08-18 NOTE — OP THERAPY DAILY TREATMENT
Outpatient Physical Therapy  DAILY TREATMENT     [facility] Therapy [address], [address]  [facility] [address]  Phone:  [phone]    Date: 08/18/2022    Patient: Giselle Ramon  YOB: 1945  MRN: [id number]     Time Calculation    Start time: 1345  Stop time: 1430 Time Calculation (min): 45 minutes         Chief Complaint: No chief complaint on file.    Visit #: 14    SUBJECTIVE:  ***    OBJECTIVE:  Current objective measures: ***          Outpatient Physical Therapy  DAILY TREATMENT     [facility] [address], [address]  [facility] [address]  Phone:  [phone]    Date: 08/18/2022    Patient: Giselle Ramon  YOB: 1945  MRN: [id number]     Time Calculation    Start time: 1345  Stop time: 1430 Time Calculation (min): 45 minutes         Chief Complaint: No chief complaint on file.    Visit #: 14    SUBJECTIVE:  Feeling good, wants to continue PT on her own with HEP secondary to financial strain.      OBJECTIVE:  Current objective measures: 0-125 left knee          Therapeutic Exercises (CPT 40283):     1. lateral stepping in patrallel bars, 2 x 15 reps b, NT    5. ball bridge, x 20    6. nu step L4. 5,  10min     7. step ups 6  inch step, 2 x 1 b    8. bridge 2 x 10 2 x30 sec    10. row, green 3 x 10, NT    11. shoulder ext, green 3 x 10, NT    12. heel raises standing, x 20    13. sit to stand, x 10 hip hinge    Therapeutic Treatments and Modalities:     1. Gait Training (CPT 80511), level surfaces with SPC 2  x 300 feet , adjusted new SPC to correct height    2. Manual Therapy (CPT 90786), lateral quad Tib anterior and peroneus longusglute medius  IASTM    4. E Stim Unattended (CPT 62239), Russian glute medius, VL continuous x 15 min with MHP left lateral thigh and glute supine  Time-based treatments/modalities:    Physical Therapy Timed Treatment Charges  Gait training minutes (CPT 41591): 5 minutes  Manual therapy  minutes (CPT 13810): 10 minutes  Therapeutic exercise minutes (CPT 14186): 30 minutes    ASSESSMENT:   Response to treatment: progressing well with LE/quad strength and knee AROM.  Ambulating without assistive device in house and uses cane to rest back for community distances.  Back /sciatic symptoms have decreased in intensity and are intermittent.    Patient feels that she can continue with HEP Indep and will call if additional follow ups required in the next 30 days. Patient will be discharged if no additional follow ups are needed.     PLAN/RECOMMENDATIONS:   Plan for treatment: therapy treatment to continue next visit.  Planned interventions for next visit: continue with current treatment.         Physical Therapy Timed Treatment Charges  Gait training minutes (CPT 60341): 5 minutes  Manual therapy minutes (CPT 84870): 10 minutes  Therapeutic exercise minutes (CPT 18182): 30 minutes

## 2022-08-18 NOTE — OP THERAPY DAILY TREATMENT
Outpatient Physical Therapy  DAILY TREATMENT      [facility] Therapy [place] [address] [facility], [address]  [place] [address]  Phone:  [phone]     Date: 08/18/2022     Patient: Giselle Ramon  YOB: 1945  MRN: [id number]      Time Calculation     Start time: 1345  Stop time: 1430 Time Calculation (min): 45 minutes            Chief Complaint: No chief complaint on file.     Visit #: 14     SUBJECTIVE:  Feeling good, wants to continue PT on her own with HEP secondary to financial strain.       OBJECTIVE:  Current objective measures: 0-125 left knee        Therapeutic Exercises (CPT 54780):     1. lateral stepping in patrallel bars, 2 x 15 reps b, NT    5. ball bridge, x 20    6. nu step L4. 5,  10min     7. step ups 6  inch step, 2 x 1 b    8. bridge 2 x 10 2 x30 sec    10. row, green 3 x 10, NT    11. shoulder ext, green 3 x 10, NT    12. heel raises standing, x 20    13. sit to stand, x 10 hip hinge     Therapeutic Treatments and Modalities:     1. Gait Training (CPT 60209), level surfaces with SPC 2  x 300 feet , adjusted new SPC to correct height    2. Manual Therapy (CPT 80451), lateral quad Tib anterior and peroneus longusglute medius  IASTM    4. E Stim Unattended (CPT 17868), Russian glute medius, VL continuous x 15 min with MHP left lateral thigh and glute supine  Time-based treatments/modalities:     Physical Therapy Timed Treatment Charges  Gait training minutes (CPT 14852): 5 minutes  Manual therapy minutes (CPT 07500): 10 minutes  Therapeutic exercise minutes (CPT 28795): 30 minutes     ASSESSMENT:   Response to treatment: progressing well with LE/quad strength and knee AROM.  Ambulating without assistive device in house and uses cane to rest back for community distances.  Back /sciatic symptoms have decreased in intensity and are intermittent.    Patient feels that she can continue with HEP Indep and will call if additional follow ups required in the next 30 days. Patient  will be discharged if no additional follow ups are needed.      PLAN/RECOMMENDATIONS:   Plan for treatment: therapy treatment to continue next visit.  Planned interventions for next visit: continue with current treatment.           Physical Therapy Timed Treatment Charges  Gait training minutes (CPT 50972): 5 minutes  Manual therapy minutes (CPT 87879): 10 minutes  Therapeutic exercise minutes (CPT 55142): 30 minutes

## 2022-08-22 ENCOUNTER — APPOINTMENT (OUTPATIENT)
Dept: PHYSICAL THERAPY | Facility: REHABILITATION | Age: 77
End: 2022-08-22
Attending: ORTHOPAEDIC SURGERY
Payer: MEDICARE

## 2022-09-07 ENCOUNTER — APPOINTMENT (OUTPATIENT)
Dept: PHYSICAL THERAPY | Facility: REHABILITATION | Age: 77
End: 2022-09-07
Attending: FAMILY MEDICINE

## 2022-10-04 ENCOUNTER — APPOINTMENT (OUTPATIENT)
Dept: PHYSICAL THERAPY | Facility: REHABILITATION | Age: 77
End: 2022-10-04
Attending: FAMILY MEDICINE

## 2022-10-05 ENCOUNTER — TELEPHONE (OUTPATIENT)
Dept: SCHEDULING | Facility: IMAGING CENTER | Age: 77
End: 2022-10-05

## 2022-11-11 ENCOUNTER — PATIENT MESSAGE (OUTPATIENT)
Dept: HEALTH INFORMATION MANAGEMENT | Facility: OTHER | Age: 77
End: 2022-11-11

## 2022-12-02 SDOH — ECONOMIC STABILITY: TRANSPORTATION INSECURITY
IN THE PAST 12 MONTHS, HAS LACK OF RELIABLE TRANSPORTATION KEPT YOU FROM MEDICAL APPOINTMENTS, MEETINGS, WORK OR FROM GETTING THINGS NEEDED FOR DAILY LIVING?: NO

## 2022-12-02 SDOH — ECONOMIC STABILITY: TRANSPORTATION INSECURITY
IN THE PAST 12 MONTHS, HAS THE LACK OF TRANSPORTATION KEPT YOU FROM MEDICAL APPOINTMENTS OR FROM GETTING MEDICATIONS?: NO

## 2022-12-02 SDOH — ECONOMIC STABILITY: TRANSPORTATION INSECURITY
IN THE PAST 12 MONTHS, HAS LACK OF TRANSPORTATION KEPT YOU FROM MEETINGS, WORK, OR FROM GETTING THINGS NEEDED FOR DAILY LIVING?: NO

## 2022-12-02 SDOH — ECONOMIC STABILITY: FOOD INSECURITY: WITHIN THE PAST 12 MONTHS, THE FOOD YOU BOUGHT JUST DIDN'T LAST AND YOU DIDN'T HAVE MONEY TO GET MORE.: NEVER TRUE

## 2022-12-02 SDOH — HEALTH STABILITY: MENTAL HEALTH
STRESS IS WHEN SOMEONE FEELS TENSE, NERVOUS, ANXIOUS, OR CAN'T SLEEP AT NIGHT BECAUSE THEIR MIND IS TROUBLED. HOW STRESSED ARE YOU?: RATHER MUCH

## 2022-12-02 SDOH — ECONOMIC STABILITY: HOUSING INSECURITY
IN THE LAST 12 MONTHS, WAS THERE A TIME WHEN YOU DID NOT HAVE A STEADY PLACE TO SLEEP OR SLEPT IN A SHELTER (INCLUDING NOW)?: NO

## 2022-12-02 SDOH — HEALTH STABILITY: PHYSICAL HEALTH: ON AVERAGE, HOW MANY MINUTES DO YOU ENGAGE IN EXERCISE AT THIS LEVEL?: 0 MIN

## 2022-12-02 SDOH — HEALTH STABILITY: PHYSICAL HEALTH: ON AVERAGE, HOW MANY DAYS PER WEEK DO YOU ENGAGE IN MODERATE TO STRENUOUS EXERCISE (LIKE A BRISK WALK)?: 0 DAYS

## 2022-12-02 SDOH — ECONOMIC STABILITY: FOOD INSECURITY: WITHIN THE PAST 12 MONTHS, YOU WORRIED THAT YOUR FOOD WOULD RUN OUT BEFORE YOU GOT MONEY TO BUY MORE.: NEVER TRUE

## 2022-12-02 SDOH — ECONOMIC STABILITY: INCOME INSECURITY: HOW HARD IS IT FOR YOU TO PAY FOR THE VERY BASICS LIKE FOOD, HOUSING, MEDICAL CARE, AND HEATING?: SOMEWHAT HARD

## 2022-12-02 SDOH — ECONOMIC STABILITY: INCOME INSECURITY: IN THE LAST 12 MONTHS, WAS THERE A TIME WHEN YOU WERE NOT ABLE TO PAY THE MORTGAGE OR RENT ON TIME?: NO

## 2022-12-02 SDOH — ECONOMIC STABILITY: HOUSING INSECURITY: IN THE LAST 12 MONTHS, HOW MANY PLACES HAVE YOU LIVED?: 1

## 2022-12-02 ASSESSMENT — LIFESTYLE VARIABLES
HOW MANY STANDARD DRINKS CONTAINING ALCOHOL DO YOU HAVE ON A TYPICAL DAY: PATIENT DOES NOT DRINK
HOW OFTEN DO YOU HAVE A DRINK CONTAINING ALCOHOL: NEVER
HOW OFTEN DO YOU HAVE SIX OR MORE DRINKS ON ONE OCCASION: NEVER
SKIP TO QUESTIONS 9-10: 1
AUDIT-C TOTAL SCORE: 0

## 2022-12-02 ASSESSMENT — SOCIAL DETERMINANTS OF HEALTH (SDOH)
HOW HARD IS IT FOR YOU TO PAY FOR THE VERY BASICS LIKE FOOD, HOUSING, MEDICAL CARE, AND HEATING?: SOMEWHAT HARD
HOW OFTEN DO YOU ATTEND CHURCH OR RELIGIOUS SERVICES?: NEVER
DO YOU BELONG TO ANY CLUBS OR ORGANIZATIONS SUCH AS CHURCH GROUPS UNIONS, FRATERNAL OR ATHLETIC GROUPS, OR SCHOOL GROUPS?: NO
HOW OFTEN DO YOU ATTENT MEETINGS OF THE CLUB OR ORGANIZATION YOU BELONG TO?: NEVER
DO YOU BELONG TO ANY CLUBS OR ORGANIZATIONS SUCH AS CHURCH GROUPS UNIONS, FRATERNAL OR ATHLETIC GROUPS, OR SCHOOL GROUPS?: NO
HOW OFTEN DO YOU HAVE A DRINK CONTAINING ALCOHOL: NEVER
HOW MANY DRINKS CONTAINING ALCOHOL DO YOU HAVE ON A TYPICAL DAY WHEN YOU ARE DRINKING: PATIENT DOES NOT DRINK
IN A TYPICAL WEEK, HOW MANY TIMES DO YOU TALK ON THE PHONE WITH FAMILY, FRIENDS, OR NEIGHBORS?: MORE THAN THREE TIMES A WEEK
HOW OFTEN DO YOU ATTEND CHURCH OR RELIGIOUS SERVICES?: NEVER
HOW OFTEN DO YOU GET TOGETHER WITH FRIENDS OR RELATIVES?: ONCE A WEEK
IN A TYPICAL WEEK, HOW MANY TIMES DO YOU TALK ON THE PHONE WITH FAMILY, FRIENDS, OR NEIGHBORS?: MORE THAN THREE TIMES A WEEK
HOW OFTEN DO YOU GET TOGETHER WITH FRIENDS OR RELATIVES?: ONCE A WEEK
HOW OFTEN DO YOU ATTENT MEETINGS OF THE CLUB OR ORGANIZATION YOU BELONG TO?: NEVER
HOW OFTEN DO YOU HAVE SIX OR MORE DRINKS ON ONE OCCASION: NEVER
WITHIN THE PAST 12 MONTHS, YOU WORRIED THAT YOUR FOOD WOULD RUN OUT BEFORE YOU GOT THE MONEY TO BUY MORE: NEVER TRUE

## 2022-12-05 ENCOUNTER — OFFICE VISIT (OUTPATIENT)
Dept: MEDICAL GROUP | Facility: PHYSICIAN GROUP | Age: 77
End: 2022-12-05
Payer: MEDICARE

## 2022-12-05 VITALS
BODY MASS INDEX: 33.18 KG/M2 | OXYGEN SATURATION: 95 % | SYSTOLIC BLOOD PRESSURE: 140 MMHG | HEART RATE: 92 BPM | DIASTOLIC BLOOD PRESSURE: 92 MMHG | WEIGHT: 224 LBS | TEMPERATURE: 98 F | HEIGHT: 69 IN

## 2022-12-05 DIAGNOSIS — Z96.652 STATUS POST TOTAL LEFT KNEE REPLACEMENT: ICD-10-CM

## 2022-12-05 DIAGNOSIS — Z11.59 NEED FOR HEPATITIS C SCREENING TEST: ICD-10-CM

## 2022-12-05 DIAGNOSIS — R03.0 WHITE COAT SYNDROME WITHOUT DIAGNOSIS OF HYPERTENSION: ICD-10-CM

## 2022-12-05 DIAGNOSIS — Z23 NEED FOR VACCINATION: ICD-10-CM

## 2022-12-05 DIAGNOSIS — M19.90 ARTHRITIS: ICD-10-CM

## 2022-12-05 DIAGNOSIS — R73.01 ELEVATED FASTING GLUCOSE: ICD-10-CM

## 2022-12-05 DIAGNOSIS — G56.03 BILATERAL CARPAL TUNNEL SYNDROME: ICD-10-CM

## 2022-12-05 DIAGNOSIS — I49.3 PVC'S (PREMATURE VENTRICULAR CONTRACTIONS): ICD-10-CM

## 2022-12-05 PROBLEM — M17.12 ARTHRITIS OF LEFT KNEE: Status: RESOLVED | Noted: 2022-05-06 | Resolved: 2022-12-05

## 2022-12-05 PROCEDURE — G0008 ADMIN INFLUENZA VIRUS VAC: HCPCS | Performed by: NURSE PRACTITIONER

## 2022-12-05 PROCEDURE — 90662 IIV NO PRSV INCREASED AG IM: CPT | Performed by: NURSE PRACTITIONER

## 2022-12-05 PROCEDURE — 99214 OFFICE O/P EST MOD 30 MIN: CPT | Mod: 25 | Performed by: NURSE PRACTITIONER

## 2022-12-05 ASSESSMENT — ENCOUNTER SYMPTOMS
FEVER: 0
DIZZINESS: 0
HEARTBURN: 0
CHILLS: 0
COUGH: 0
SHORTNESS OF BREATH: 0
WHEEZING: 0
PALPITATIONS: 0
DEPRESSION: 0

## 2022-12-05 ASSESSMENT — FIBROSIS 4 INDEX: FIB4 SCORE: 1.74

## 2022-12-06 NOTE — ASSESSMENT & PLAN NOTE
- Patient will check blood pressures daily over the next 2 weeks and follow-up before the end of the month for annual wellness visit  -Consider starting blood pressure medication if blood pressures are significantly elevated.

## 2022-12-06 NOTE — PROGRESS NOTES
Subjective:     Chief Complaint   Patient presents with    Establish Care       HPI:   Giselle presents today with     Problem   Arthritis    Chronic in nature. States arthritis in hands, back, knees. States was taking meloxicam states recently acid reflux has been much worse. Plan to stop meloxicam. States usually can improve it with aloe and diet changes.      Pvc's (Premature Ventricular Contractions)    Chronic in nature. States stable over multiple years. Echo WNL.      White Coat Syndrome Without Diagnosis of Hypertension    Chronic in nature.  Patient states that she has put on a fair amount of weight and is concerned that her blood pressure may actually be on the higher side.  She has not been taking it at home.  States that she has not any chest pain, palpitations, dizziness, blurry vision.  Blood pressure on repeat today is 140/92 which is better but still elevated.  She does have some whitecoat hypertension as well.     Status Post Total Left Knee Replacement    Chronic in nature. States doing well. States that she has been doing well with PT, states released.     Elevated Fasting Glucose    Chronic in nature. States was seeing Dr. Herman.  Last recent A1c was within normal limits, plan will be to recheck labs once a year.     Carpal Tunnel Syndrome    Chronic in nature.  Stable.  Bilateral. States doing well. Continues PT exercises.      Arthritis of Left Knee (Resolved)    Added automatically from request for surgery [id number]         Current Outpatient Medications Ordered in Epic   Medication Sig Dispense Refill    Famotidine (PEPCID PO) Take 20 mg by mouth 2 times a day.      docosahexanoic acid (OMEGA 3 FA) 1000 MG Cap       Cyanocobalamin (VITAMIN B-12 PO) Take  by mouth every day.      Coenzyme Q10 (COQ10 PO) Take  by mouth every day.      MAGNESIUM PO Take  by mouth every day.      CALCIUM PO Take  by mouth every day.      Cholecalciferol (VITAMIN D3 PO) Take  by mouth every day.      BIOTIN PO Take   "by mouth every day.      S-Adenosylmethionine (SAME) 400 MG Tab Take  by mouth.      Misc Natural Products (OSTEO BI-FLEX ADV DOUBLE ST PO) Take 2 Tablets by mouth every day.      CORAL CALCIUM PO Take  by mouth.      CINNAMON PO Take  by mouth.      Menaquinone-7 (VITAMIN K2 PO) Take  by mouth.      CRANBERRY PO Take  by mouth.      NON SPECIFIED 2 Tablets every day. OTC vitamin for hair      Probiotic Product (PROBIOTIC PO) Take 2 Capsules by mouth every day.      Non Formulary Request 2 Capsules 2 (two) times a day. Hydro Eye      meloxicam (MOBIC) 15 MG tablet Take 15 mg by mouth every day.      montelukast (SINGULAIR) 10 MG Tab TAKE 1 TABLET BY MOUTH ONE TIME DAILY AT BEDTIME      fluticasone (FLONASE) 50 MCG/ACT nasal spray fluticasone propionate 50 mcg/actuation nasal spray,suspension      Calcium Carbonate Antacid 850 MG Chew Tab Take 1 Tab by mouth.      acetaminophen (TYLENOL) 500 MG TABS Take 1,000 mg by mouth every 6 hours as needed.      cetirizine (ZYRTEC) 10 MG TABS Take 10 mg by mouth every day.       No current [facility]-ordered facility-administered medications on file.       Health Maintenance: Annual wellness visit scheduled    Review of Systems   Constitutional:  Negative for chills, fever and malaise/fatigue.   Respiratory:  Negative for cough, shortness of breath and wheezing.    Cardiovascular:  Negative for chest pain, palpitations and leg swelling.   Gastrointestinal:  Negative for heartburn.   Genitourinary:  Negative for dysuria.   Neurological:  Negative for dizziness.   Psychiatric/Behavioral:  Negative for depression.        Objective:     Exam:  BP (!) 140/92 (BP Location: Left arm, Patient Position: Sitting)   Pulse 92   Temp 36.7 °C (98 °F) (Temporal)   Ht 1.753 m (5' 9\") Comment: pt reported  Wt 102 kg (224 lb)   SpO2 95%   BMI 33.08 kg/m²  Body mass index is 33.08 kg/m².    Physical Exam  Constitutional:       Appearance: Normal appearance.   Eyes:      Pupils: Pupils are equal, " round, and reactive to light.   Cardiovascular:      Rate and Rhythm: Normal rate and regular rhythm.   Pulmonary:      Effort: Pulmonary effort is normal.      Breath sounds: Normal breath sounds.   Skin:     General: Skin is warm and dry.      Capillary Refill: Capillary refill takes less than 2 seconds.   Neurological:      General: No focal deficit present.      Mental Status: She is alert and oriented to person, place, and time.     Assessment & Plan:     77 y.o. female with the following -     Problem List Items Addressed This Visit       Arthritis     -Continue over-the-counter treatment at this time           Carpal tunnel syndrome     - Continue monitoring         Elevated fasting glucose     - We will monitor         Relevant Orders    Comp Metabolic Panel    HEMOGLOBIN A1C    Lipid Profile    PVC's (premature ventricular contractions)     - monitor.         Status post total left knee replacement     - states doing well, IT band improving.         White coat syndrome without diagnosis of hypertension     - Patient will check blood pressures daily over the next 2 weeks and follow-up before the end of the month for annual wellness visit  -Consider starting blood pressure medication if blood pressures are significantly elevated.          Other Visit Diagnoses       Need for vaccination        Need for hepatitis C screening test        Relevant Orders    HEP C VIRUS ANTIBODY            Return if symptoms worsen or fail to improve, for HTN.    I have placed the below orders and discussed them with an approved delegating provider. The MA is performing the below orders under the direction of Dr. Lucio.     Please note that this dictation was created using voice recognition software. I have made every reasonable attempt to correct obvious errors, but I expect that there are errors of grammar and possibly content that I did not discover before finalizing the note.

## 2022-12-22 ENCOUNTER — OFFICE VISIT (OUTPATIENT)
Dept: MEDICAL GROUP | Facility: PHYSICIAN GROUP | Age: 77
End: 2022-12-22
Payer: MEDICARE

## 2022-12-22 VITALS
RESPIRATION RATE: 16 BRPM | DIASTOLIC BLOOD PRESSURE: 80 MMHG | SYSTOLIC BLOOD PRESSURE: 142 MMHG | TEMPERATURE: 98.1 F | HEART RATE: 94 BPM | WEIGHT: 224.2 LBS | OXYGEN SATURATION: 96 % | BODY MASS INDEX: 33.21 KG/M2 | HEIGHT: 69 IN

## 2022-12-22 DIAGNOSIS — R73.01 ELEVATED FASTING GLUCOSE: ICD-10-CM

## 2022-12-22 DIAGNOSIS — I49.3 PVC'S (PREMATURE VENTRICULAR CONTRACTIONS): ICD-10-CM

## 2022-12-22 DIAGNOSIS — G56.03 BILATERAL CARPAL TUNNEL SYNDROME: ICD-10-CM

## 2022-12-22 DIAGNOSIS — J30.89 ENVIRONMENTAL AND SEASONAL ALLERGIES: ICD-10-CM

## 2022-12-22 DIAGNOSIS — Z96.652 STATUS POST TOTAL LEFT KNEE REPLACEMENT: ICD-10-CM

## 2022-12-22 DIAGNOSIS — M19.90 ARTHRITIS: ICD-10-CM

## 2022-12-22 DIAGNOSIS — Z00.00 MEDICARE ANNUAL WELLNESS VISIT, SUBSEQUENT: Primary | ICD-10-CM

## 2022-12-22 DIAGNOSIS — R03.0 WHITE COAT SYNDROME WITHOUT DIAGNOSIS OF HYPERTENSION: ICD-10-CM

## 2022-12-22 DIAGNOSIS — M85.89 OSTEOPENIA OF MULTIPLE SITES: ICD-10-CM

## 2022-12-22 PROCEDURE — G0439 PPPS, SUBSEQ VISIT: HCPCS | Performed by: NURSE PRACTITIONER

## 2022-12-22 RX ORDER — MELOXICAM 15 MG/1
15 TABLET ORAL DAILY
Qty: 90 TABLET | Refills: 3 | Status: SHIPPED | OUTPATIENT
Start: 2022-12-22 | End: 2023-10-09

## 2022-12-22 ASSESSMENT — FIBROSIS 4 INDEX: FIB4 SCORE: 1.74

## 2022-12-22 ASSESSMENT — ENCOUNTER SYMPTOMS: GENERAL WELL-BEING: GOOD

## 2022-12-22 ASSESSMENT — ACTIVITIES OF DAILY LIVING (ADL): BATHING_REQUIRES_ASSISTANCE: 0

## 2022-12-22 ASSESSMENT — PATIENT HEALTH QUESTIONNAIRE - PHQ9: CLINICAL INTERPRETATION OF PHQ2 SCORE: 0

## 2022-12-22 NOTE — LETTER
[facility]  MICHELE BoydRVIPUL.  [address]  [place] [address]  Fax: [phone]   Authorization for Release/Disclosure of   Protected Health Information   Name: ELENA LARSEN : 1945 SSN: [id number]   Address: [address] [place] [address] [address]  [place] [address] Phone:    [phone] (home)    I authorize the entity listed below to release/disclose the PHI below to:   [facility]/MICHELE BoydRKIT and CORBIN Boyd   Provider or Entity Name:  [facility], [facility], [facility]  [address] [place] Phone:  [phone]    Fax:     Reason for request: continuity of care   Information to be released:    [  ] LAST COLONOSCOPY,  including any PATH REPORT and follow-up  [  ] LAST FIT/COLOGUARD RESULT [  ] LAST DEXA  [  ] LAST MAMMOGRAM  [  ] LAST PAP  [  ] LAST LABS [  ] RETINA EXAM REPORT  [XX] IMMUNIZATION RECORDS  [XX] Release all info      [  ] Check here and initial the line next to each item to release ALL health information INCLUDING  _____ Care and treatment for drug and / or alcohol abuse  _____ HIV testing, infection status, or AIDS  _____ Genetic Testing    DATES OF SERVICE OR TIME PERIOD TO BE DISCLOSED: _____________  I understand and acknowledge that:  * This Authorization may be revoked at any time by you in writing, except if your health information has already been used or disclosed.  * Your health information that will be used or disclosed as a result of you signing this authorization could be re-disclosed by the recipient. If this occurs, your re-disclosed health information may no longer be protected by State or Federal laws.  * You may refuse to sign this Authorization. Your refusal will not affect your ability to obtain treatment.  * This Authorization becomes effective upon signing and will  on (date) __________.      If no date is indicated, this Authorization will  one (1) year  from the signature date.    Name: Giselle Ramon    Signature:   Date:     12/22/2022       PLEASE FAX REQUESTED RECORDS BACK TO: [phone]

## 2022-12-22 NOTE — PROGRESS NOTES
Chief Complaint   Patient presents with    Annual Exam     SCP        HPI:  Giselle is a 77 y.o. here for Medicare Annual Wellness Visit    Problem   Arthritis    Chronic in nature. Tried stopping anti-inflammatory. States 7.5 works well. States arthritis in hands, back, knees. States was taking meloxicam states recently acid reflux has been much worse. States thinks she can control with diet.     Pvc's (Premature Ventricular Contractions)    Chronic in nature. States stable over multiple years. No recent change or increase in symptoms.     White Coat Syndrome Without Diagnosis of Hypertension    Chronic in nature. Stable.   States she is doing well. She has not been taking it at home.  States that she has not any chest pain, palpitations, dizziness, blurry vision.  Blood pressure on repeat today is stil 142/80. She is feeling anxiety.   She does have some whitecoat hypertension as well. She will check BP at home over the next 2 weeks.        Status Post Total Left Knee Replacement    Chronic in nature. States doing well. Knee pain was worse without anti-inflammatory. States that she has been doing well with PT, states released.     Osteopenia of Multiple Sites    Chronic in nature. Repeat DEXA due in 05/23. Continues vitamin D, calcium and exercise.     Environmental and Seasonal Allergies    Chronic in nature. Stable. Overall controlled. Continues OTC medications for symptoms as needed.     Elevated Fasting Glucose    Chronic in nature. Patient will complete labs in the next few weeks to update status.     Carpal Tunnel Syndrome    Chronic in nature.  Stable.  Bilateral. No recent increase in symptoms. States doing well. Continues PT exercises.           Patient Active Problem List    Diagnosis Date Noted    Arthritis 12/05/2022    PVC's (premature ventricular contractions) 12/05/2022    White coat syndrome without diagnosis of hypertension 12/05/2022    Status post total left knee replacement 06/27/2022     Osteopenia of multiple sites 12/01/2021    Environmental and seasonal allergies 12/01/2021    Elevated fasting glucose 12/01/2021    Carpal tunnel syndrome 06/16/2014       Current Outpatient Medications   Medication Sig Dispense Refill    meloxicam (MOBIC) 15 MG tablet Take 1 Tablet by mouth every day. 90 Tablet 3    Famotidine (PEPCID PO) Take 20 mg by mouth 2 times a day.      docosahexanoic acid (OMEGA 3 FA) 1000 MG Cap       Cyanocobalamin (VITAMIN B-12 PO) Take  by mouth every day.      Coenzyme Q10 (COQ10 PO) Take  by mouth every day.      MAGNESIUM PO Take  by mouth every day.      CALCIUM PO Take  by mouth every day.      Cholecalciferol (VITAMIN D3 PO) Take  by mouth every day.      BIOTIN PO Take  by mouth every day.      S-Adenosylmethionine (SAME) 400 MG Tab Take  by mouth.      Misc Natural Products (OSTEO BI-FLEX ADV DOUBLE ST PO) Take 2 Tablets by mouth every day.      CORAL CALCIUM PO Take  by mouth.      CINNAMON PO Take  by mouth.      Menaquinone-7 (VITAMIN K2 PO) Take  by mouth.      CRANBERRY PO Take  by mouth.      NON SPECIFIED 2 Tablets every day. OTC vitamin for hair      Probiotic Product (PROBIOTIC PO) Take 2 Capsules by mouth every day.      Non Formulary Request 2 Capsules 2 (two) times a day. [facility]      montelukast (SINGULAIR) 10 MG Tab TAKE 1 TABLET BY MOUTH ONE TIME DAILY AT BEDTIME      fluticasone (FLONASE) 50 MCG/ACT nasal spray fluticasone propionate 50 mcg/actuation nasal spray,suspension      Calcium Carbonate Antacid 850 MG Chew Tab Take 1 Tab by mouth.      acetaminophen (TYLENOL) 500 MG TABS Take 1,000 mg by mouth every 6 hours as needed.      cetirizine (ZYRTEC) 10 MG TABS Take 10 mg by mouth every day.       No current facility-administered medications for this visit.        Patient is taking medications as noted in medication list.  Current supplements as per medication list.     Allergies: Codeine, Penicillins, Sulfa drugs, Morphine, and Trimethoprim    Current  social contact/activities: Children, grandhildren, engaging with friends      Is patient current with immunizations? Yes.    She  reports that she quit smoking about 57 years ago. Her smoking use included cigarettes. She started smoking about 59 years ago. She has never used smokeless tobacco. She reports that she does not drink alcohol and does not use drugs.  Counseling given: Not Answered      ROS:    Gait: Uses a walker   Ostomy: No   Other tubes: No   Amputations: No   Chronic oxygen use No   Last eye exam  9/2022  Wears hearing aids: No   : Reports urinary leakage during the last 6 months that has not interfered at all with their daily activities or sleep.    Screening:    Depression Screening  Little interest or pleasure in doing things?  0 - not at all  Feeling down, depressed, or hopeless? 0 - not at all  Trouble falling or staying asleep, or sleeping too much?     Feeling tired or having little energy?     Poor appetite or overeating?     Feeling bad about yourself - or that you are a failure or have let yourself or your family down?    Trouble concentrating on things, such as reading the newspaper or watching television?    Moving or speaking so slowly that other people could have noticed.  Or the opposite - being so fidgety or restless that you have been moving around a lot more than usual?     Thoughts that you would be better off dead, or of hurting yourself?     Patient Health Questionnaire Score:      If depressive symptoms identified deferred to follow up visit unless specifically addressed in assessment and plan.    Interpretation of PHQ-9 Total Score   Score Severity   1-4 No Depression   5-9 Mild Depression   10-14 Moderate Depression   15-19 Moderately Severe Depression   20-27 Severe Depression    Screening for Cognitive Impairment  Three Minute Recall (daughter, heaven, mountain)  3/3    Draw clock face with all 12 numbers and set the hands to show 10 past 11.  Yes    If cognitive concerns  identified, deferred for follow up unless specifically addressed in assessment and plan.    Fall Risk Assessment  Has the patient had two or more falls in the last year or any fall with injury in the last year?  No  If fall risk identified, deferred for follow up unless specifically addressed in assessment and plan.    Safety Assessment  Throw rugs on floor.  Yes  Handrails on all stairs.  No  Good lighting in all hallways.  Yes  Difficulty hearing.  Yes  Patient counseled about all safety risks that were identified.    Functional Assessment ADLs  Are there any barriers preventing you from cooking for yourself or meeting nutritional needs?  No.    Are there any barriers preventing you from driving safely or obtaining transportation?  No.    Are there any barriers preventing you from using a telephone or calling for help?  No.    Are there any barriers preventing you from shopping?  No.    Are there any barriers preventing you from taking care of your own finances?  No.    Are there any barriers preventing you from managing your medications?  No.    Are there any barriers preventing you from showering, bathing or dressing yourself?  No.    Are you currently engaging in any exercise or physical activity?  Yes.  Stretches for back   What is your perception of your health?  Good.    Advance Care Planning  Do you have an Advance Directive, Living Will, Durable Power of , or POLST? Yes  Advance Directive       is on file    Health Maintenance Summary            Overdue - Annual Wellness Visit (Every 366 Days) Overdue - never done      No completion history exists for this topic.              Ordered - HEPATITIS C SCREENING (Once) Ordered on 12/5/2022      No completion history exists for this topic.              Overdue - IMM ZOSTER VACCINES (1 of 2) Overdue - never done      No completion history exists for this topic.              Overdue - IMM PNEUMOCOCCAL VACCINE: 65+ Years (1 - PCV) Overdue - never done       No completion history exists for this topic.              Overdue - COVID-19 Vaccine (3 - Booster for Pfizer series) Overdue since 2021  Imm Admin: PFIZER PURPLE CAP SARS-COV-2 VACCINATION (12+)    2021  Imm Admin: PFIZER PURPLE CAP SARS-COV-2 VACCINATION (12+)              Postponed - IMM DTaP/Tdap/Td Vaccine (1 - Tdap) Postponed until 2023      No completion history exists for this topic.              BONE DENSITY (Every 5 Years) Next due on 2021  DS-BONE DENSITY STUDY (DEXA)              IMM INFLUENZA (Series Information) Completed      2022  Imm Admin: Influenza Vaccine Adult HD    2021  Imm Admin: Influenza Vaccine Adult HD              IMM HEP B VACCINE (Series Information) Aged Out      No completion history exists for this topic.              IMM MENINGOCOCCAL ACWY VACCINE (Series Information) Aged Out      No completion history exists for this topic.              Discontinued - MAMMOGRAM  Discontinued        Frequency changed to Never automatically (Topic No Longer Applies)    2021  MA-DIAGNOSTIC MAMMO LEFT W/TOMOSYNTHESIS W/O CAD    2021  MA-SCREENING MAMMO BILAT W/TOMOSYNTHESIS W/CAD                    Patient Care Team:  MARCY Boyd.P.RAlbertNlAbert as PCP - General (Family Medicine)  Mirtha Field M.D. as PCP - [facility] Paneled  Kajal Holguin PT as Physical Therapist (Physical Therapy)  Ginette Naik PT, MSPT as Physical Therapist (Physical Therapist)    Social History     Tobacco Use    Smoking status: Former     Types: Cigarettes     Start date: 1964     Quit date: 1966     Years since quittin.0    Smokeless tobacco: Never   Vaping Use    Vaping Use: Never used   Substance Use Topics    Alcohol use: No    Drug use: No     Family History   Problem Relation Age of Onset    Alcohol abuse Mother         liver issues    Cancer Father         lung and colon    Stroke Sister     Hypertension Sister      She   "has a past medical history of Allergy, Anesthesia, Arrhythmia, Arthritis, Blood clotting disorder (HCC), Elevated blood sugar, Heart burn, Hypertension, Indigestion, Pain (06/06/2014), PONV (postoperative nausea and vomiting), and Unspecified cataract.    She has no past medical history of Cold or Dental disorder.   Past Surgical History:   Procedure Laterality Date    PB TOTAL KNEE ARTHROPLASTY Left 06/27/2022    Procedure: ARTHROPLASTY, KNEE, TOTAL;  Surgeon: Nj Ortiz M.D.;  Location: SURGERY [facility];  Service: Orthopedics    VT NASAL SCOPY,RMV TISS MAXILL SINUS Left 12/14/2021    Procedure: ENDOSCOPY, PARANASAL SINUS, WITH TOTAL ETHMOIDECTOMY, SPHENOIDOTOMY, MAXILLARY ANTROSTOMY, SPHEN+MAX SIN TISS REM, AND EXPLORATION FRONT SIN - ANTERIOR ETHMOIDECTOMY;  Surgeon: Yobany Lugo M.D.;  Location: SURGERY SAME DAY [facility];  Service: Ent    CARPAL TUNNEL RELEASE  07/08/2014    Performed by Herson Hedrick M.D. at SURGERY [facility] ORS    CARPAL TUNNEL RELEASE  06/16/2014    Performed by Herson Hedrick M.D. at SURGERY [facility] ORS    OTHER  2011    Bilateral IOL's    GYN SURGERY  1999    Hysterectomy    OTHER Left 1998    Vein ligation-  Leftleg    CARPAL TUNNEL RELEASE  1973    APPENDECTOMY  1972    ABDOMINAL HYSTERECTOMY TOTAL      ARTHROPLASTY      EYE SURGERY      OTHER Left Around 2010    Vein ablasion left leg    TUBAL COAGULATION LAPAROSCOPIC BILATERAL      TUBAL LIGATION         Exam:   BP (!) 142/80 (BP Location: Left arm, Patient Position: Sitting, BP Cuff Size: Large adult)   Pulse 94   Temp 36.7 °C (98.1 °F) (Temporal)   Resp 16   Ht 1.753 m (5' 9\")   Wt 102 kg (224 lb 3.2 oz)   SpO2 96%  Body mass index is 33.11 kg/m².    Hearing excellent.    Dentition good  Alert, oriented in no acute distress  Eye contact is good, speech goal directed, affect calm      Assessment and Plan. The following treatment and monitoring plan is recommended:      Problem List Items Addressed This Visit "       Arthritis     -continue meloxicam 15 mg PO daily.            Relevant Medications    meloxicam (MOBIC) 15 MG tablet    Carpal tunnel syndrome     -continue monitoring.         Elevated fasting glucose     -labs ordered         Environmental and seasonal allergies     - continue OTC medications as needed         Osteopenia of multiple sites     -will repeat DEXA in 05/23         PVC's (premature ventricular contractions)     -monitor for changes in symptoms.         Status post total left knee replacement     - continue recovery         White coat syndrome without diagnosis of hypertension     - BP monitoring at home.                Services suggested: No services needed at this time  Health Care Screening recommendations as per orders if indicated.  Referrals offered: PT/OT/Nutrition counseling/Behavioral Health/Smoking cessation as per orders if indicated.    Discussion today about general wellness and lifestyle habits:    Prevent falls and reduce trip hazards; Cautioned about securing or removing rugs.  Have a working fire alarm and carbon monoxide detector;   Engage in regular physical activity and social activities.     Follow-up: Return in about 3 months (around 3/22/2023), or if symptoms worsen or fail to improve.

## 2023-01-09 ENCOUNTER — HOSPITAL ENCOUNTER (OUTPATIENT)
Dept: LAB | Facility: MEDICAL CENTER | Age: 78
End: 2023-01-09
Attending: NURSE PRACTITIONER
Payer: MEDICARE

## 2023-01-09 DIAGNOSIS — R73.01 ELEVATED FASTING GLUCOSE: ICD-10-CM

## 2023-01-09 DIAGNOSIS — Z11.59 NEED FOR HEPATITIS C SCREENING TEST: ICD-10-CM

## 2023-01-09 LAB
ALBUMIN SERPL BCP-MCNC: 4.4 G/DL (ref 3.2–4.9)
ALBUMIN/GLOB SERPL: 1.8 G/DL
ALP SERPL-CCNC: 78 U/L (ref 30–99)
ALT SERPL-CCNC: 20 U/L (ref 2–50)
ANION GAP SERPL CALC-SCNC: 13 MMOL/L (ref 7–16)
AST SERPL-CCNC: 25 U/L (ref 12–45)
BILIRUB SERPL-MCNC: 0.7 MG/DL (ref 0.1–1.5)
BUN SERPL-MCNC: 15 MG/DL (ref 8–22)
CALCIUM ALBUM COR SERPL-MCNC: 9.8 MG/DL (ref 8.5–10.5)
CALCIUM SERPL-MCNC: 10.1 MG/DL (ref 8.5–10.5)
CHLORIDE SERPL-SCNC: 104 MMOL/L (ref 96–112)
CHOLEST SERPL-MCNC: 169 MG/DL (ref 100–199)
CO2 SERPL-SCNC: 23 MMOL/L (ref 20–33)
CREAT SERPL-MCNC: 0.77 MG/DL (ref 0.5–1.4)
EST. AVERAGE GLUCOSE BLD GHB EST-MCNC: 120 MG/DL
FASTING STATUS PATIENT QL REPORTED: NORMAL
GFR SERPLBLD CREATININE-BSD FMLA CKD-EPI: 80 ML/MIN/1.73 M 2
GLOBULIN SER CALC-MCNC: 2.4 G/DL (ref 1.9–3.5)
GLUCOSE SERPL-MCNC: 81 MG/DL (ref 65–99)
HBA1C MFR BLD: 5.8 % (ref 4–5.6)
HCV AB SER QL: NORMAL
HDLC SERPL-MCNC: 53 MG/DL
LDLC SERPL CALC-MCNC: 98 MG/DL
POTASSIUM SERPL-SCNC: 4.6 MMOL/L (ref 3.6–5.5)
PROT SERPL-MCNC: 6.8 G/DL (ref 6–8.2)
SODIUM SERPL-SCNC: 140 MMOL/L (ref 135–145)
TRIGL SERPL-MCNC: 88 MG/DL (ref 0–149)

## 2023-01-09 PROCEDURE — 83036 HEMOGLOBIN GLYCOSYLATED A1C: CPT

## 2023-01-09 PROCEDURE — 80053 COMPREHEN METABOLIC PANEL: CPT

## 2023-01-09 PROCEDURE — 86803 HEPATITIS C AB TEST: CPT

## 2023-01-09 PROCEDURE — 36415 COLL VENOUS BLD VENIPUNCTURE: CPT

## 2023-01-09 PROCEDURE — 80061 LIPID PANEL: CPT

## 2023-01-12 ENCOUNTER — OFFICE VISIT (OUTPATIENT)
Dept: MEDICAL GROUP | Facility: PHYSICIAN GROUP | Age: 78
End: 2023-01-12
Payer: MEDICARE

## 2023-01-12 VITALS
HEIGHT: 69 IN | SYSTOLIC BLOOD PRESSURE: 146 MMHG | HEART RATE: 88 BPM | BODY MASS INDEX: 32.73 KG/M2 | TEMPERATURE: 98.7 F | WEIGHT: 221 LBS | DIASTOLIC BLOOD PRESSURE: 90 MMHG | OXYGEN SATURATION: 96 %

## 2023-01-12 DIAGNOSIS — I83.92 ASYMPTOMATIC VARICOSE VEINS OF LEFT LOWER EXTREMITY: ICD-10-CM

## 2023-01-12 DIAGNOSIS — I10 PRIMARY HYPERTENSION: ICD-10-CM

## 2023-01-12 PROCEDURE — 99213 OFFICE O/P EST LOW 20 MIN: CPT | Performed by: NURSE PRACTITIONER

## 2023-01-12 RX ORDER — LISINOPRIL 10 MG/1
10 TABLET ORAL DAILY
Qty: 30 TABLET | Refills: 0 | Status: SHIPPED | OUTPATIENT
Start: 2023-01-12 | End: 2023-02-07 | Stop reason: SDUPTHER

## 2023-01-12 ASSESSMENT — ENCOUNTER SYMPTOMS
COUGH: 0
PALPITATIONS: 0
CHILLS: 0
WHEEZING: 0
DIZZINESS: 0
SHORTNESS OF BREATH: 0
HEARTBURN: 0
FEVER: 0
DEPRESSION: 0

## 2023-01-12 ASSESSMENT — PATIENT HEALTH QUESTIONNAIRE - PHQ9: CLINICAL INTERPRETATION OF PHQ2 SCORE: 0

## 2023-01-12 ASSESSMENT — FIBROSIS 4 INDEX: FIB4 SCORE: 1.74

## 2023-01-13 NOTE — PROGRESS NOTES
Subjective:     Chief Complaint   Patient presents with    Hypertension Follow-up     BP log shows Consistently higher at night       HPI:   Giselle presents today with     Problem   Primary Hypertension    Chronic in nature.  Patient's blood pressure today is 146/90.  On blood pressure log and morning blood pressures are generally in the 130s over 70s but her evening blood pressures are much higher. states that she stopped salt intake due to the higher blood pressures.. Does take anti-inflammatory in the evening, but BP was taken before anti-inflammatory would be in her system. States drinking a glass of water instead of eating when bored.  DENIES chest pain, palpitations, dizziness, blurry vision.     Asymptomatic Varicose Veins of Left Lower Extremity    Chronic in nature. Has had some swelling in the left.         Current Outpatient Medications Ordered in Epic   Medication Sig Dispense Refill    lisinopril (PRINIVIL) 10 MG Tab Take 1 Tablet by mouth every day. 30 Tablet 0    meloxicam (MOBIC) 15 MG tablet Take 1 Tablet by mouth every day. 90 Tablet 3    Famotidine (PEPCID PO) Take 20 mg by mouth 2 times a day.      docosahexanoic acid (OMEGA 3 FA) 1000 MG Cap       Cyanocobalamin (VITAMIN B-12 PO) Take  by mouth every day.      Coenzyme Q10 (COQ10 PO) Take  by mouth every day.      MAGNESIUM PO Take  by mouth every day.      CALCIUM PO Take  by mouth every day.      Cholecalciferol (VITAMIN D3 PO) Take  by mouth every day.      BIOTIN PO Take  by mouth every day.      S-Adenosylmethionine (SAME) 400 MG Tab Take  by mouth.      Misc Natural Products (OSTEO BI-FLEX ADV DOUBLE ST PO) Take 2 Tablets by mouth every day.      CORAL CALCIUM PO Take  by mouth.      CINNAMON PO Take  by mouth.      Menaquinone-7 (VITAMIN K2 PO) Take  by mouth.      CRANBERRY PO Take  by mouth.      NON SPECIFIED 2 Tablets every day. OTC vitamin for hair      Probiotic Product (PROBIOTIC PO) Take 2 Capsules by mouth every day.      Non  "Formulary Request 2 Capsules 2 (two) times a day. [facility]      montelukast (SINGULAIR) 10 MG Tab TAKE 1 TABLET BY MOUTH ONE TIME DAILY AT BEDTIME      fluticasone (FLONASE) 50 MCG/ACT nasal spray fluticasone propionate 50 mcg/actuation nasal spray,suspension      Calcium Carbonate Antacid 850 MG Chew Tab Take 1 Tab by mouth.      acetaminophen (TYLENOL) 500 MG TABS Take 1,000 mg by mouth every 6 hours as needed.      cetirizine (ZYRTEC) 10 MG TABS Take 10 mg by mouth every day.       No current [facility]-ordered facility-administered medications on file.       Health Maintenance: trying to get records    Review of Systems   Constitutional:  Negative for chills, fever and malaise/fatigue.   Respiratory:  Negative for cough, shortness of breath and wheezing.    Cardiovascular:  Negative for chest pain, palpitations and leg swelling.   Gastrointestinal:  Negative for heartburn.   Neurological:  Negative for dizziness.   Psychiatric/Behavioral:  Negative for depression.        Objective:     Exam:  BP (!) 146/90 (BP Location: Left arm, Patient Position: Sitting, BP Cuff Size: Adult)   Pulse 88   Temp 37.1 °C (98.7 °F) (Temporal)   Ht 1.753 m (5' 9\")   Wt 100 kg (221 lb)   SpO2 96%   BMI 32.64 kg/m²  Body mass index is 32.64 kg/m².    Physical Exam  Constitutional:       Appearance: Normal appearance.   HENT:      Head: Normocephalic and atraumatic.   Eyes:      Pupils: Pupils are equal, round, and reactive to light.   Cardiovascular:      Rate and Rhythm: Normal rate and regular rhythm.      Pulses: Normal pulses.      Heart sounds: Normal heart sounds.   Pulmonary:      Effort: Pulmonary effort is normal.      Breath sounds: Normal breath sounds.   Skin:     General: Skin is warm and dry.      Capillary Refill: Capillary refill takes less than 2 seconds.   Neurological:      General: No focal deficit present.      Mental Status: She is alert and oriented to person, place, and time.     Assessment & Plan:     77 y.o. " female with the following -     Problem List Items Addressed This Visit       Asymptomatic varicose veins of left lower extremity     - Not a concern at this time, has been treated by vascular medicine previously.         Relevant Medications    lisinopril (PRINIVIL) 10 MG Tab    Primary hypertension     - Start lisinopril 10 mg by mouth daily         Relevant Medications    lisinopril (PRINIVIL) 10 MG Tab       Return in about 1 month (around 2/12/2023), or if symptoms worsen or fail to improve.    I have placed the below orders and discussed them with an approved delegating provider. The MA is performing the below orders under the direction of Dr. Lucio.     Please note that this dictation was created using voice recognition software. I have made every reasonable attempt to correct obvious errors, but I expect that there are errors of grammar and possibly content that I did not discover before finalizing the note.

## 2023-02-06 ENCOUNTER — TELEPHONE (OUTPATIENT)
Dept: MEDICAL GROUP | Facility: PHYSICIAN GROUP | Age: 78
End: 2023-02-06
Payer: MEDICARE

## 2023-02-07 ENCOUNTER — OFFICE VISIT (OUTPATIENT)
Dept: MEDICAL GROUP | Facility: PHYSICIAN GROUP | Age: 78
End: 2023-02-07
Payer: MEDICARE

## 2023-02-07 VITALS
TEMPERATURE: 97.2 F | SYSTOLIC BLOOD PRESSURE: 124 MMHG | DIASTOLIC BLOOD PRESSURE: 70 MMHG | OXYGEN SATURATION: 95 % | HEART RATE: 79 BPM | BODY MASS INDEX: 32.76 KG/M2 | HEIGHT: 69 IN | WEIGHT: 221.2 LBS

## 2023-02-07 DIAGNOSIS — Z78.9 TAKES DIETARY SUPPLEMENTS: ICD-10-CM

## 2023-02-07 DIAGNOSIS — I83.92 ASYMPTOMATIC VARICOSE VEINS OF LEFT LOWER EXTREMITY: ICD-10-CM

## 2023-02-07 DIAGNOSIS — I10 PRIMARY HYPERTENSION: ICD-10-CM

## 2023-02-07 PROCEDURE — 99214 OFFICE O/P EST MOD 30 MIN: CPT | Performed by: NURSE PRACTITIONER

## 2023-02-07 RX ORDER — LISINOPRIL 10 MG/1
10 TABLET ORAL DAILY
Qty: 90 TABLET | Refills: 3 | Status: SHIPPED | OUTPATIENT
Start: 2023-02-07 | End: 2023-11-20

## 2023-02-07 ASSESSMENT — ENCOUNTER SYMPTOMS
ABDOMINAL PAIN: 0
NAUSEA: 0
DIARRHEA: 0
FEVER: 0
HEADACHES: 0
TINGLING: 0
PALPITATIONS: 0
VOMITING: 0
SHORTNESS OF BREATH: 0
BLURRED VISION: 0
COUGH: 0
SENSORY CHANGE: 0
DIZZINESS: 0

## 2023-02-07 ASSESSMENT — FIBROSIS 4 INDEX: FIB4 SCORE: 1.74

## 2023-02-07 NOTE — ASSESSMENT & PLAN NOTE
- Continue Lisinopril 10 mg daily   - Obtain proper fitting cuff for more accurate home BP monitoring, maintain BP log and bring to each appointment   - Follow up if dizziness, blurred vision, shortness of breath, palpitations, or consistent irritating cough    - Follow up in 3 months

## 2023-02-07 NOTE — PROGRESS NOTES
Subjective:     Chief Complaint   Patient presents with    Hypertension Follow-up       HPI:   Giselle presents today with     Problem   Takes Dietary Supplements    Reports she takes calcium and magnesium supplementation. Optimal doses of calcium and magnesium supplementation reviewed. Calcium 1200 mg and magnesium citrate 600 mg recommended. Effects and dangers of consuming too much calcium or magnesium discussed. Last serum calcium level 10.1, within normal limits on 1/9/23. States she takes Tums on a regular basis, education given regarding calcium presence in Tums and to calculate amount taking to avoid over consumption of calcium.       Primary Hypertension    Chronic in nature. Stable. /70. Reports she has been taking Lisinopril 10 mg daily for 1 month as prescribed. Maintains home BP log and brought with her to appointment. Home BP values inconsistent, ranging from -152. States concern arm cuff may be too large and she plans to obtain proper fitting cuff for more reliable results. BP values slightly more elevated in evening time related to emotional distress and memories of caring for spouse. Optimal times for obtaining BP result discussed, such as when relaxed and calm. Denies palpitations, blurred vision, dizziness, shortness of breath. Denies adverse effects of medication, no headaches, itching, abdominal pain, N/V, or diarrhea. States she has a chronic dry cough after eating associated with GERD, this cough only occurs after consuming food. Education given regarding monitoring of cough and notifying provider if cough occurs more frequently and becomes irritating as it could be a possible side effect of Lisinopril.      Asymptomatic Varicose Veins of Left Lower Extremity    Chronic in nature. Stable. Reports after ablation varicose veins much better and has no issues.          Current Outpatient Medications Ordered in Epic   Medication Sig Dispense Refill    lisinopril (PRINIVIL) 10 MG Tab  Take 1 Tablet by mouth every day. 90 Tablet 3    meloxicam (MOBIC) 15 MG tablet Take 1 Tablet by mouth every day. 90 Tablet 3    Famotidine (PEPCID PO) Take 20 mg by mouth 2 times a day.      docosahexanoic acid (OMEGA 3 FA) 1000 MG Cap       Cyanocobalamin (VITAMIN B-12 PO) Take  by mouth every day.      Coenzyme Q10 (COQ10 PO) Take  by mouth every day.      MAGNESIUM PO Take  by mouth every day.      CALCIUM PO Take  by mouth every day.      Cholecalciferol (VITAMIN D3 PO) Take  by mouth every day.      BIOTIN PO Take  by mouth every day.      S-Adenosylmethionine (SAME) 400 MG Tab Take  by mouth.      Misc Natural Products (OSTEO BI-FLEX ADV DOUBLE ST PO) Take 2 Tablets by mouth every day.      CORAL CALCIUM PO Take  by mouth.      CINNAMON PO Take  by mouth.      Menaquinone-7 (VITAMIN K2 PO) Take  by mouth.      CRANBERRY PO Take  by mouth.      NON SPECIFIED 2 Tablets every day. OTC vitamin for hair      Probiotic Product (PROBIOTIC PO) Take 2 Capsules by mouth every day.      Non Formulary Request 2 Capsules 2 (two) times a day. [facility]      montelukast (SINGULAIR) 10 MG Tab TAKE 1 TABLET BY MOUTH ONE TIME DAILY AT BEDTIME      fluticasone (FLONASE) 50 MCG/ACT nasal spray fluticasone propionate 50 mcg/actuation nasal spray,suspension      Calcium Carbonate Antacid 850 MG Chew Tab Take 1 Tab by mouth.      acetaminophen (TYLENOL) 500 MG TABS Take 1,000 mg by mouth every 6 hours as needed.      cetirizine (ZYRTEC) 10 MG TABS Take 10 mg by mouth every day.       No current [facility]-ordered facility-administered medications on file.       Health Maintenance: Completed    Review of Systems   Constitutional:  Negative for fever.   Eyes:  Negative for blurred vision.   Respiratory:  Negative for cough and shortness of breath.    Cardiovascular:  Negative for chest pain, palpitations and leg swelling.   Gastrointestinal:  Negative for abdominal pain, diarrhea, nausea and vomiting.   Neurological:  Negative for  "dizziness, tingling, sensory change and headaches.       Objective:     Exam:  /70 (BP Location: Left arm, Patient Position: Sitting, BP Cuff Size: Large adult)   Pulse 79   Temp 36.2 °C (97.2 °F) (Temporal)   Ht 1.753 m (5' 9\")   Wt 100 kg (221 lb 3.2 oz)   SpO2 95%   BMI 32.67 kg/m²  Body mass index is 32.67 kg/m².    Physical Exam  Constitutional:       Appearance: Normal appearance.   Cardiovascular:      Rate and Rhythm: Normal rate and regular rhythm.      Heart sounds: Normal heart sounds.   Pulmonary:      Effort: Pulmonary effort is normal.      Breath sounds: Normal breath sounds.   Neurological:      Mental Status: She is alert.   Psychiatric:         Mood and Affect: Mood normal.         Behavior: Behavior normal.     Assessment & Plan:     77 y.o. female with the following -     Problem List Items Addressed This Visit       Asymptomatic varicose veins of left lower extremity     - Not a concern at this time. Has been treated by vascular medicine previously.          Relevant Medications    lisinopril (PRINIVIL) 10 MG Tab    Primary hypertension     - Continue Lisinopril 10 mg daily   - Obtain proper fitting cuff for more accurate home BP monitoring, maintain BP log and bring to each appointment   - Follow up if dizziness, blurred vision, shortness of breath, palpitations, or consistent irritating cough    - Follow up in 3 months            Relevant Medications    lisinopril (PRINIVIL) 10 MG Tab    Takes dietary supplements     - Recommend 1200 mg calcium supplementation total daily  - Calculate calcium amount consumed via Tums           Return in about 3 months (around 5/7/2023) for HTN.    My total time spent caring for the patient on the day of the encounter was 30 minutes.   This does not include time spent on separately billable procedures/tests.      Please note that this dictation was created using voice recognition software. I have made every reasonable attempt to correct obvious " errors, but I expect that there are errors of grammar and possibly content that I did not discover before finalizing the note.

## 2023-02-07 NOTE — TELEPHONE ENCOUNTER
VOICEMAIL  1. Caller Name: Giselle                       Call Back Number: [phone]     2. Message: Patient left vm stating that  she has 5 days left of lisinopril and needs a refill. Stated she was confused as to why it was denied.     3. Patient approves office to leave a detailed voicemail/MyChart message: N\A      Phone Number Called: [phone]     Call outcome: Spoke to patient regarding message below.    Message: Spoke to patient and stated that her Lisinopril was still pending meaning Mark had not reviewed this yet. Explained that she needs to r/s her appointment from the 16th. Scheduled her for tomorrow.

## 2023-02-07 NOTE — ASSESSMENT & PLAN NOTE
- Recommend 1200 mg calcium supplementation total daily  - Calculate calcium amount consumed via Tums

## 2023-05-09 ENCOUNTER — OFFICE VISIT (OUTPATIENT)
Dept: MEDICAL GROUP | Facility: PHYSICIAN GROUP | Age: 78
End: 2023-05-09
Payer: MEDICARE

## 2023-05-09 VITALS
WEIGHT: 216.6 LBS | TEMPERATURE: 98 F | DIASTOLIC BLOOD PRESSURE: 80 MMHG | HEART RATE: 83 BPM | HEIGHT: 69 IN | OXYGEN SATURATION: 93 % | SYSTOLIC BLOOD PRESSURE: 134 MMHG | BODY MASS INDEX: 32.08 KG/M2

## 2023-05-09 DIAGNOSIS — I10 PRIMARY HYPERTENSION: ICD-10-CM

## 2023-05-09 DIAGNOSIS — Z23 NEED FOR VACCINATION: ICD-10-CM

## 2023-05-09 DIAGNOSIS — K21.9 GASTROESOPHAGEAL REFLUX DISEASE WITHOUT ESOPHAGITIS: ICD-10-CM

## 2023-05-09 DIAGNOSIS — Z12.31 ENCOUNTER FOR SCREENING MAMMOGRAM FOR MALIGNANT NEOPLASM OF BREAST: ICD-10-CM

## 2023-05-09 DIAGNOSIS — E66.9 OBESITY (BMI 30.0-34.9): ICD-10-CM

## 2023-05-09 PROBLEM — E66.811 OBESITY (BMI 30.0-34.9): Status: ACTIVE | Noted: 2023-05-09

## 2023-05-09 PROCEDURE — G0009 ADMIN PNEUMOCOCCAL VACCINE: HCPCS | Performed by: NURSE PRACTITIONER

## 2023-05-09 PROCEDURE — 90472 IMMUNIZATION ADMIN EACH ADD: CPT | Performed by: NURSE PRACTITIONER

## 2023-05-09 PROCEDURE — 90732 PPSV23 VACC 2 YRS+ SUBQ/IM: CPT | Performed by: NURSE PRACTITIONER

## 2023-05-09 PROCEDURE — 90715 TDAP VACCINE 7 YRS/> IM: CPT | Performed by: NURSE PRACTITIONER

## 2023-05-09 PROCEDURE — 99214 OFFICE O/P EST MOD 30 MIN: CPT | Mod: 25 | Performed by: NURSE PRACTITIONER

## 2023-05-09 RX ORDER — FAMOTIDINE 40 MG/1
40 TABLET, FILM COATED ORAL 2 TIMES DAILY
Qty: 180 TABLET | Refills: 0 | Status: SHIPPED | OUTPATIENT
Start: 2023-05-09 | End: 2023-07-24 | Stop reason: SDUPTHER

## 2023-05-09 ASSESSMENT — ENCOUNTER SYMPTOMS
WHEEZING: 0
COUGH: 0
HEADACHES: 0
FEVER: 0
DEPRESSION: 0
SHORTNESS OF BREATH: 0
NAUSEA: 0
CHILLS: 0
PALPITATIONS: 0
DIZZINESS: 0
HEARTBURN: 0

## 2023-05-09 ASSESSMENT — FIBROSIS 4 INDEX: FIB4 SCORE: 1.74

## 2023-05-10 NOTE — PROGRESS NOTES
Subjective:     Chief Complaint   Patient presents with    Follow-Up     3 mo f/v       HPI:   Giselle presents today with     Problem   Obesity (Bmi 30.0-34.9)   Gastroesophageal Reflux Disease Without Esophagitis    Chronic in nature.  Symptoms have been worse recently states that she is taking Pepcid 40 mg twice daily as recommended by her allergy specialist states that this is overall working well though she does still have some breakthrough symptoms.     Primary Hypertension    Chronic in nature. Stable. /80. Reports she has been taking Lisinopril 10 mg daily. Overall BP is within normal limits.          Current Outpatient Medications Ordered in Epic   Medication Sig Dispense Refill    clindamycin (CLEOCIN) 300 MG Cap Take two capsules by mouth 1 hour prior to dental work 2 Capsule 0    famotidine (PEPCID) 40 MG Tab Take 1 Tablet by mouth 2 times a day for 90 days. 180 Tablet 0    lisinopril (PRINIVIL) 10 MG Tab Take 1 Tablet by mouth every day. 90 Tablet 3    meloxicam (MOBIC) 15 MG tablet Take 1 Tablet by mouth every day. 90 Tablet 3    docosahexanoic acid (OMEGA 3 FA) 1000 MG Cap       Cyanocobalamin (VITAMIN B-12 PO) Take  by mouth every day.      Coenzyme Q10 (COQ10 PO) Take  by mouth every day.      MAGNESIUM PO Take  by mouth every day.      CALCIUM PO Take  by mouth every day.      Cholecalciferol (VITAMIN D3 PO) Take  by mouth every day.      BIOTIN PO Take  by mouth every day.      S-Adenosylmethionine (SAME) 400 MG Tab Take  by mouth.      Misc Natural Products (OSTEO BI-FLEX ADV DOUBLE ST PO) Take 2 Tablets by mouth every day.      CORAL CALCIUM PO Take  by mouth.      CINNAMON PO Take  by mouth.      Menaquinone-7 (VITAMIN K2 PO) Take  by mouth.      CRANBERRY PO Take  by mouth.      NON SPECIFIED 2 Tablets every day. OTC vitamin for hair      Probiotic Product (PROBIOTIC PO) Take 2 Capsules by mouth every day.      Non Formulary Request 2 Capsules 2 (two) times a day. [facility]       "montelukast (SINGULAIR) 10 MG Tab TAKE 1 TABLET BY MOUTH ONE TIME DAILY AT BEDTIME      fluticasone (FLONASE) 50 MCG/ACT nasal spray fluticasone propionate 50 mcg/actuation nasal spray,suspension      Calcium Carbonate Antacid 850 MG Chew Tab Take 1 Tab by mouth.      acetaminophen (TYLENOL) 500 MG TABS Take 1,000 mg by mouth every 6 hours as needed.      cetirizine (ZYRTEC) 10 MG TABS Take 10 mg by mouth every day.       No current [facility]-ordered facility-administered medications on file.       Health Maintenance: Completed    Review of Systems   Constitutional:  Negative for chills, fever and malaise/fatigue.   Respiratory:  Negative for cough, shortness of breath and wheezing.    Cardiovascular:  Negative for chest pain, palpitations and leg swelling.   Gastrointestinal:  Negative for heartburn and nausea.   Genitourinary:  Negative for dysuria.   Neurological:  Negative for dizziness and headaches.   Psychiatric/Behavioral:  Negative for depression and suicidal ideas.        Objective:     Exam:  /80 (BP Location: Left arm, Patient Position: Sitting, BP Cuff Size: Adult)   Pulse 83   Temp 36.7 °C (98 °F) (Temporal)   Ht 1.753 m (5' 9\")   Wt 98.2 kg (216 lb 9.6 oz)   SpO2 93%   BMI 31.99 kg/m²  Body mass index is 31.99 kg/m².    Physical Exam  Constitutional:       Appearance: Normal appearance.   Cardiovascular:      Rate and Rhythm: Normal rate and regular rhythm.      Pulses: Normal pulses.      Heart sounds: Normal heart sounds.   Pulmonary:      Effort: Pulmonary effort is normal.      Breath sounds: Normal breath sounds.   Skin:     General: Skin is warm and dry.      Capillary Refill: Capillary refill takes less than 2 seconds.   Neurological:      General: No focal deficit present.      Mental Status: She is alert and oriented to person, place, and time.     Assessment & Plan:     77 y.o. female with the following -     Problem List Items Addressed This Visit       Gastroesophageal reflux " disease without esophagitis     - Continue Pepcid 40 mg twice daily and Aloe Vera         Relevant Medications    famotidine (PEPCID) 40 MG Tab    Obesity (BMI 30.0-34.9)    Relevant Orders    Patient identified as having weight management issue.  Appropriate orders and counseling given.    Primary hypertension     - Continue lisinopril 10 mg daily.  Blood pressure log indicates blood pressure well controlled.          Other Visit Diagnoses       Encounter for screening mammogram for malignant neoplasm of breast        Relevant Orders    MA-SCREENING MAMMO BILAT W/TOMOSYNTHESIS W/CAD    Need for vaccination        Relevant Orders    Pneumovax Vaccine (PPSV23) (Completed)    Tdap Vaccine =>8YO IM (Completed)            Return in about 6 months (around 11/9/2023).    I have placed the below orders and discussed them with an approved delegating provider. The MA is performing the below orders under the direction of Dr. Holt.     Please note that this dictation was created using voice recognition software. I have made every reasonable attempt to correct obvious errors, but I expect that there are errors of grammar and possibly content that I did not discover before finalizing the note.

## 2023-05-15 ENCOUNTER — HOSPITAL ENCOUNTER (OUTPATIENT)
Dept: RADIOLOGY | Facility: MEDICAL CENTER | Age: 78
End: 2023-05-15
Attending: NURSE PRACTITIONER
Payer: MEDICARE

## 2023-05-15 DIAGNOSIS — Z12.31 ENCOUNTER FOR SCREENING MAMMOGRAM FOR MALIGNANT NEOPLASM OF BREAST: ICD-10-CM

## 2023-05-15 PROCEDURE — 77063 BREAST TOMOSYNTHESIS BI: CPT

## 2023-06-02 ENCOUNTER — PATIENT MESSAGE (OUTPATIENT)
Dept: HEALTH INFORMATION MANAGEMENT | Facility: OTHER | Age: 78
End: 2023-06-02

## 2023-06-02 ENCOUNTER — DOCUMENTATION (OUTPATIENT)
Dept: HEALTH INFORMATION MANAGEMENT | Facility: OTHER | Age: 78
End: 2023-06-02
Payer: MEDICARE

## 2023-07-05 ENCOUNTER — TELEPHONE (OUTPATIENT)
Dept: PHYSICAL THERAPY | Facility: REHABILITATION | Age: 78
End: 2023-07-05
Payer: MEDICARE

## 2023-07-05 NOTE — OP THERAPY DISCHARGE SUMMARY
Outpatient Physical Therapy  DISCHARGE SUMMARY NOTE      [facility] [address]  [address]  [place] [address]  Phone:  [phone]  Fax:  [phone]    Date of Visit: 07/05/2023    Patient: Giselle Krishna  YOB: 1945  MRN: [id number]     Referring Provider:  Nj Ortiz M.D.  [address] [place] [address]      Referring Diagnosis Unilateral primary osteoarthritis, left knee [M17.12]           Your patient is being discharged from Physical Therapy with the following comments:   Goals met    Comments:  Pt attended 14 PT visits 7/5/22 to 8/18/22 with good progression of L knee ROM and strength, improved gait and symptoms, following L TKA.      Kajal Holguin, PT    Date: 7/5/2023

## 2023-07-24 DIAGNOSIS — K21.9 GASTROESOPHAGEAL REFLUX DISEASE WITHOUT ESOPHAGITIS: ICD-10-CM

## 2023-07-24 RX ORDER — FAMOTIDINE 40 MG/1
40 TABLET, FILM COATED ORAL 2 TIMES DAILY
Qty: 180 TABLET | Refills: 0 | Status: SHIPPED | OUTPATIENT
Start: 2023-07-24 | End: 2023-10-09

## 2023-10-11 DIAGNOSIS — K21.9 GASTROESOPHAGEAL REFLUX DISEASE WITHOUT ESOPHAGITIS: ICD-10-CM

## 2023-10-12 RX ORDER — FAMOTIDINE 40 MG/1
40 TABLET, FILM COATED ORAL 2 TIMES DAILY
Qty: 180 TABLET | Refills: 0 | Status: SHIPPED | OUTPATIENT
Start: 2023-10-12

## 2023-11-13 ENCOUNTER — OFFICE VISIT (OUTPATIENT)
Dept: MEDICAL GROUP | Facility: PHYSICIAN GROUP | Age: 78
End: 2023-11-13
Payer: MEDICARE

## 2023-11-13 VITALS
HEART RATE: 85 BPM | WEIGHT: 208.2 LBS | DIASTOLIC BLOOD PRESSURE: 64 MMHG | HEIGHT: 69 IN | OXYGEN SATURATION: 97 % | BODY MASS INDEX: 30.84 KG/M2 | SYSTOLIC BLOOD PRESSURE: 130 MMHG | TEMPERATURE: 97.6 F

## 2023-11-13 DIAGNOSIS — K21.9 GASTROESOPHAGEAL REFLUX DISEASE WITHOUT ESOPHAGITIS: ICD-10-CM

## 2023-11-13 DIAGNOSIS — Z23 NEED FOR VACCINATION: ICD-10-CM

## 2023-11-13 DIAGNOSIS — I10 PRIMARY HYPERTENSION: ICD-10-CM

## 2023-11-13 DIAGNOSIS — I10 WHITE COAT SYNDROME WITH DIAGNOSIS OF HYPERTENSION: ICD-10-CM

## 2023-11-13 DIAGNOSIS — B37.31 YEAST VAGINITIS: ICD-10-CM

## 2023-11-13 DIAGNOSIS — F32.0 CURRENT MILD EPISODE OF MAJOR DEPRESSIVE DISORDER WITHOUT PRIOR EPISODE (HCC): ICD-10-CM

## 2023-11-13 PROCEDURE — 90662 IIV NO PRSV INCREASED AG IM: CPT | Performed by: NURSE PRACTITIONER

## 2023-11-13 PROCEDURE — 99214 OFFICE O/P EST MOD 30 MIN: CPT | Mod: 25 | Performed by: NURSE PRACTITIONER

## 2023-11-13 PROCEDURE — 3075F SYST BP GE 130 - 139MM HG: CPT | Performed by: NURSE PRACTITIONER

## 2023-11-13 PROCEDURE — 3078F DIAST BP <80 MM HG: CPT | Performed by: NURSE PRACTITIONER

## 2023-11-13 PROCEDURE — G0008 ADMIN INFLUENZA VIRUS VAC: HCPCS | Performed by: NURSE PRACTITIONER

## 2023-11-13 RX ORDER — FLUCONAZOLE 150 MG/1
150 TABLET ORAL DAILY
Qty: 3 TABLET | Refills: 0 | Status: SHIPPED | OUTPATIENT
Start: 2023-11-13

## 2023-11-13 ASSESSMENT — PATIENT HEALTH QUESTIONNAIRE - PHQ9
8. MOVING OR SPEAKING SO SLOWLY THAT OTHER PEOPLE COULD HAVE NOTICED. OR THE OPPOSITE, BEING SO FIGETY OR RESTLESS THAT YOU HAVE BEEN MOVING AROUND A LOT MORE THAN USUAL: NOT AT ALL
4. FEELING TIRED OR HAVING LITTLE ENERGY: NOT AT ALL
9. THOUGHTS THAT YOU WOULD BE BETTER OFF DEAD, OR OF HURTING YOURSELF: NOT AT ALL
7. TROUBLE CONCENTRATING ON THINGS, SUCH AS READING THE NEWSPAPER OR WATCHING TELEVISION: NOT AT ALL
3. TROUBLE FALLING OR STAYING ASLEEP OR SLEEPING TOO MUCH: NOT AT ALL
6. FEELING BAD ABOUT YOURSELF - OR THAT YOU ARE A FAILURE OR HAVE LET YOURSELF OR YOUR FAMILY DOWN: NOT AL ALL
SUM OF ALL RESPONSES TO PHQ QUESTIONS 1-9: 2
5. POOR APPETITE OR OVEREATING: NOT AT ALL
2. FEELING DOWN, DEPRESSED, IRRITABLE, OR HOPELESS: SEVERAL DAYS
SUM OF ALL RESPONSES TO PHQ9 QUESTIONS 1 AND 2: 2
1. LITTLE INTEREST OR PLEASURE IN DOING THINGS: SEVERAL DAYS

## 2023-11-13 ASSESSMENT — ENCOUNTER SYMPTOMS
DEPRESSION: 0
FALLS: 0
FEVER: 0
HEADACHES: 0
COUGH: 0
PALPITATIONS: 0
MYALGIAS: 0
SHORTNESS OF BREATH: 0
DIZZINESS: 0
WHEEZING: 0
CHILLS: 0
ABDOMINAL PAIN: 0

## 2023-11-13 ASSESSMENT — FIBROSIS 4 INDEX: FIB4 SCORE: 1.76

## 2023-11-13 NOTE — LETTER
November 13, 2023        Giselle Krishna  [address]    [address]  [facility] [address]        To Whom it May Concern:    I recommend that Giselle have an emotional support animal as this will improve her mental health symptoms and reduce her anxiety significantly.     If you have any questions or concerns, please don't hesitate to call.        Sincerely,        MARCY Boyd.P.R.MARY LOU.    Electronically Signed

## 2023-11-13 NOTE — PROGRESS NOTES
Subjective:     Chief Complaint   Patient presents with    Follow-Up    Paperwork     For  dog        HPI:   Giselle presents today with     Problem   Yeast Vaginitis    This is a new issue over the past month. States that she is having itching and redness. Has noticed some white chunky discharge. States that she hasn't found anything that makes it worse or better. Has not noticed an odor.      Current Mild Episode of Major Depressive Disorder Without Prior Episode (Hcc)    Chronic in nature. Continued symptoms with grieving. States that in the past having a  has been significantly helpful in improving mood. Recommended by counselor.     Primary Hypertension    Chronic in nature. Stable. /78. Repeat is. Reports she has been taking Lisinopril 10 mg daily. States overall in normal limits at home. States lost 10 lbs related to changes in diet.      White Coat Syndrome With Diagnosis of Hypertension    Chronic in nature. Stable.   States she is doing well. She has not been taking it at home.  States that she has not any chest pain, palpitations, dizziness, blurry vision.  Blood pressure on repeat today is 130/64 on repeat.         Carpal Tunnel Syndrome    Chronic in nature.  Stable.  Bilateral. No recent increase in symptoms. States doing well. Continues PT exercises.  FREDERICK data reconciliation     Brachial Neuritis    FREDERICK data reconciliation         Current Outpatient Medications Ordered in Epic   Medication Sig Dispense Refill    fluconazole (DIFLUCAN) 150 MG tablet Take 1 Tablet by mouth every day. 3 Tablet 0    famotidine (PEPCID) 40 MG Tab TAKE 1 TABLET BY MOUTH TWICE DAILY 180 Tablet 0    meloxicam (MOBIC) 15 MG tablet TAKE 1 TABLET BY MOUTH EVERY DAY 90 Tablet 0    clindamycin (CLEOCIN) 300 MG Cap 2 CAPSULES BY MOUTH 1 HR PRIOR TO PROCEDURE 2 Capsule 2    clindamycin (CLEOCIN) 300 MG Cap Take two capsules by mouth 1 hour prior to dental work 2 Capsule 0    lisinopril (PRINIVIL) 10 MG  Tab Take 1 Tablet by mouth every day. 90 Tablet 3    docosahexanoic acid (OMEGA 3 FA) 1000 MG Cap       Cyanocobalamin (VITAMIN B-12 PO) Take  by mouth every day.      Coenzyme Q10 (COQ10 PO) Take  by mouth every day.      MAGNESIUM PO Take  by mouth every day.      CALCIUM PO Take  by mouth every day.      Cholecalciferol (VITAMIN D3 PO) Take  by mouth every day.      BIOTIN PO Take  by mouth every day.      S-Adenosylmethionine (SAME) 400 MG Tab Take  by mouth.      Misc Natural Products (OSTEO BI-FLEX ADV DOUBLE ST PO) Take 2 Tablets by mouth every day.      CORAL CALCIUM PO Take  by mouth.      CINNAMON PO Take  by mouth.      Menaquinone-7 (VITAMIN K2 PO) Take  by mouth.      CRANBERRY PO Take  by mouth.      NON SPECIFIED 2 Tablets every day. OTC vitamin for hair      Probiotic Product (PROBIOTIC PO) Take 2 Capsules by mouth every day.      Non Formulary Request 2 Capsules 2 (two) times a day. [facility]      montelukast (SINGULAIR) 10 MG Tab TAKE 1 TABLET BY MOUTH ONE TIME DAILY AT BEDTIME      fluticasone (FLONASE) 50 MCG/ACT nasal spray fluticasone propionate 50 mcg/actuation nasal spray,suspension      Calcium Carbonate Antacid 850 MG Chew Tab Take 1 Tab by mouth.      acetaminophen (TYLENOL) 500 MG TABS Take 1,000 mg by mouth every 6 hours as needed.      cetirizine (ZYRTEC) 10 MG TABS Take 10 mg by mouth every day.       No current [facility]-ordered facility-administered medications on file.       Health Maintenance: Completed    Review of Systems   Constitutional:  Negative for chills, fever and malaise/fatigue.   Respiratory:  Negative for cough, shortness of breath and wheezing.    Cardiovascular:  Negative for chest pain, palpitations and leg swelling.   Gastrointestinal:  Negative for abdominal pain.   Genitourinary:  Negative for dysuria and hematuria.   Musculoskeletal:  Negative for falls, joint pain and myalgias.   Neurological:  Negative for dizziness and headaches.   Psychiatric/Behavioral:   "Negative for depression and suicidal ideas.          Objective:     Exam:  /64 (BP Location: Left arm, Patient Position: Sitting)   Pulse 85   Temp 36.4 °C (97.6 °F) (Temporal)   Ht 1.753 m (5' 9\")   Wt 94.4 kg (208 lb 3.2 oz)   SpO2 97%   BMI 30.75 kg/m²  Body mass index is 30.75 kg/m².    Physical Exam  Constitutional:       Appearance: Normal appearance.   HENT:      Head: Normocephalic and atraumatic.   Cardiovascular:      Rate and Rhythm: Normal rate and regular rhythm.   Pulmonary:      Effort: Pulmonary effort is normal.      Breath sounds: Normal breath sounds.   Abdominal:      General: Abdomen is flat.   Skin:     General: Skin is warm and dry.      Capillary Refill: Capillary refill takes less than 2 seconds.   Neurological:      General: No focal deficit present.      Mental Status: She is alert and oriented to person, place, and time.       Assessment & Plan:     78 y.o. female with the following -     Problem List Items Addressed This Visit       Current mild episode of major depressive disorder without prior episode (HCC)     -Emotional support animal letter provided  -Continue counseling         Gastroesophageal reflux disease without esophagitis    Primary hypertension     -continue lisinopril 10 mg PO daily.         Relevant Orders    Comp Metabolic Panel    Lipid Profile    HEMOGLOBIN A1C    White coat syndrome with diagnosis of hypertension     -Continue lisinopril 10 mg p.o. daily.         Yeast vaginitis     -150 mg by mouth daily x3 days.         Relevant Medications    fluconazole (DIFLUCAN) 150 MG tablet     Other Visit Diagnoses       Need for vaccination        Relevant Orders    Influenza Vaccine, High Dose (65+ Only) (Completed)            Return in about 6 months (around 5/13/2024), or if symptoms worsen or fail to improve, for HTN.    I have placed the below orders and discussed them with an approved delegating provider. The MA is performing the below orders under the " direction of Dr. Lucio.     Please note that this dictation was created using voice recognition software. I have made every reasonable attempt to correct obvious errors, but I expect that there are errors of grammar and possibly content that I did not discover before finalizing the note.

## 2023-11-18 DIAGNOSIS — I10 PRIMARY HYPERTENSION: ICD-10-CM

## 2023-11-19 DIAGNOSIS — I10 PRIMARY HYPERTENSION: ICD-10-CM

## 2023-11-20 RX ORDER — LISINOPRIL 10 MG/1
10 TABLET ORAL DAILY
Qty: 100 TABLET | Refills: 0 | Status: SHIPPED | OUTPATIENT
Start: 2023-11-20

## 2023-11-20 RX ORDER — LISINOPRIL 10 MG/1
10 TABLET ORAL DAILY
Qty: 90 TABLET | Refills: 3 | Status: SHIPPED | OUTPATIENT
Start: 2023-11-20

## 2024-01-01 DIAGNOSIS — M19.90 ARTHRITIS: ICD-10-CM

## 2024-01-02 RX ORDER — MELOXICAM 15 MG/1
15 TABLET ORAL DAILY
Qty: 90 TABLET | Refills: 0 | Status: SHIPPED | OUTPATIENT
Start: 2024-01-02

## 2024-03-31 DIAGNOSIS — M19.90 ARTHRITIS: ICD-10-CM

## 2024-04-01 RX ORDER — MELOXICAM 15 MG/1
15 TABLET ORAL DAILY
Qty: 90 TABLET | Refills: 0 | Status: SHIPPED | OUTPATIENT
Start: 2024-04-01

## 2024-04-01 NOTE — TELEPHONE ENCOUNTER
Received request via: Pharmacy    Was the patient seen in the last year in this department? Yes    Does the patient have an active prescription (recently filled or refills available) for medication(s) requested? No    Pharmacy Name: [facility]    Does the patient have [facility] and need 100 day supply (blood pressure, diabetes and cholesterol meds only)? Patient does not have SCP

## 2024-04-08 DIAGNOSIS — K21.9 GASTROESOPHAGEAL REFLUX DISEASE WITHOUT ESOPHAGITIS: ICD-10-CM

## 2024-04-09 RX ORDER — FAMOTIDINE 40 MG/1
40 TABLET, FILM COATED ORAL 2 TIMES DAILY
Qty: 180 TABLET | Refills: 0 | Status: SHIPPED | OUTPATIENT
Start: 2024-04-09

## 2024-04-09 NOTE — TELEPHONE ENCOUNTER
Received request via: Pharmacy    Was the patient seen in the last year in this department? Yes    Does the patient have an active prescription (recently filled or refills available) for medication(s) requested? No    Pharmacy Name:  [id number] - [place], [place] - [address] [facility] [address]     Does the patient have [facility] Plus and need 100 day supply (blood pressure, diabetes and cholesterol meds only)? Medication is not for cholesterol, blood pressure or diabetes

## 2024-05-01 ENCOUNTER — HOSPITAL ENCOUNTER (OUTPATIENT)
Dept: LAB | Facility: MEDICAL CENTER | Age: 79
End: 2024-05-01
Attending: NURSE PRACTITIONER
Payer: MEDICARE

## 2024-05-01 DIAGNOSIS — I10 PRIMARY HYPERTENSION: ICD-10-CM

## 2024-05-01 LAB
ALBUMIN SERPL BCP-MCNC: 4.4 G/DL (ref 3.2–4.9)
ALBUMIN/GLOB SERPL: 2 G/DL
ALP SERPL-CCNC: 80 U/L (ref 30–99)
ALT SERPL-CCNC: 18 U/L (ref 2–50)
ANION GAP SERPL CALC-SCNC: 12 MMOL/L (ref 7–16)
AST SERPL-CCNC: 22 U/L (ref 12–45)
BILIRUB SERPL-MCNC: 0.5 MG/DL (ref 0.1–1.5)
BUN SERPL-MCNC: 17 MG/DL (ref 8–22)
CALCIUM ALBUM COR SERPL-MCNC: 9.7 MG/DL (ref 8.5–10.5)
CALCIUM SERPL-MCNC: 10 MG/DL (ref 8.5–10.5)
CHLORIDE SERPL-SCNC: 103 MMOL/L (ref 96–112)
CHOLEST SERPL-MCNC: 161 MG/DL (ref 100–199)
CO2 SERPL-SCNC: 22 MMOL/L (ref 20–33)
CREAT SERPL-MCNC: 0.8 MG/DL (ref 0.5–1.4)
EST. AVERAGE GLUCOSE BLD GHB EST-MCNC: 120 MG/DL
FASTING STATUS PATIENT QL REPORTED: NORMAL
GFR SERPLBLD CREATININE-BSD FMLA CKD-EPI: 75 ML/MIN/1.73 M 2
GLOBULIN SER CALC-MCNC: 2.2 G/DL (ref 1.9–3.5)
GLUCOSE SERPL-MCNC: 102 MG/DL (ref 65–99)
HBA1C MFR BLD: 5.8 % (ref 4–5.6)
HDLC SERPL-MCNC: 50 MG/DL
LDLC SERPL CALC-MCNC: 92 MG/DL
POTASSIUM SERPL-SCNC: 4.8 MMOL/L (ref 3.6–5.5)
PROT SERPL-MCNC: 6.6 G/DL (ref 6–8.2)
SODIUM SERPL-SCNC: 137 MMOL/L (ref 135–145)
TRIGL SERPL-MCNC: 93 MG/DL (ref 0–149)

## 2024-05-13 ENCOUNTER — OFFICE VISIT (OUTPATIENT)
Dept: MEDICAL GROUP | Facility: PHYSICIAN GROUP | Age: 79
End: 2024-05-13
Payer: MEDICARE

## 2024-05-13 VITALS
HEIGHT: 69 IN | SYSTOLIC BLOOD PRESSURE: 160 MMHG | WEIGHT: 215.4 LBS | OXYGEN SATURATION: 97 % | TEMPERATURE: 97.4 F | DIASTOLIC BLOOD PRESSURE: 88 MMHG | HEART RATE: 83 BPM | BODY MASS INDEX: 31.9 KG/M2

## 2024-05-13 DIAGNOSIS — R73.03 PREDIABETES: ICD-10-CM

## 2024-05-13 DIAGNOSIS — K21.9 GASTROESOPHAGEAL REFLUX DISEASE WITHOUT ESOPHAGITIS: ICD-10-CM

## 2024-05-13 DIAGNOSIS — Z96.652 PRESENCE OF LEFT ARTIFICIAL KNEE JOINT: ICD-10-CM

## 2024-05-13 DIAGNOSIS — I10 PRIMARY HYPERTENSION: ICD-10-CM

## 2024-05-13 DIAGNOSIS — E66.9 OBESITY (BMI 30.0-34.9): ICD-10-CM

## 2024-05-13 DIAGNOSIS — F32.0 CURRENT MILD EPISODE OF MAJOR DEPRESSIVE DISORDER WITHOUT PRIOR EPISODE (HCC): ICD-10-CM

## 2024-05-13 PROBLEM — B37.31 YEAST VAGINITIS: Status: RESOLVED | Noted: 2023-11-13 | Resolved: 2024-05-13

## 2024-05-13 PROCEDURE — 99214 OFFICE O/P EST MOD 30 MIN: CPT | Performed by: NURSE PRACTITIONER

## 2024-05-13 PROCEDURE — 3077F SYST BP >= 140 MM HG: CPT | Performed by: NURSE PRACTITIONER

## 2024-05-13 PROCEDURE — 3079F DIAST BP 80-89 MM HG: CPT | Performed by: NURSE PRACTITIONER

## 2024-05-13 RX ORDER — CLINDAMYCIN HYDROCHLORIDE 300 MG/1
CAPSULE ORAL
Qty: 10 CAPSULE | Refills: 0 | Status: SHIPPED | OUTPATIENT
Start: 2024-05-13

## 2024-05-13 ASSESSMENT — PATIENT HEALTH QUESTIONNAIRE - PHQ9: CLINICAL INTERPRETATION OF PHQ2 SCORE: 0

## 2024-05-13 ASSESSMENT — FIBROSIS 4 INDEX: FIB4 SCORE: 1.64

## 2024-05-13 NOTE — PROGRESS NOTES
Verbal consent was acquired by the patient to use [employer] ambient listening note generation during this visit yes    Subjective:     HPI:   Giselle is a 78 y.o.female established patient who presents today for:    History of Present Illness  The patient presents for evaluation of multiple medical concerns.    The patient reports a progressive worsening of her acid reflux symptoms, which have been uncontrollable and are occurring more frequently. She has been managing her symptoms with aloe vera and Pepcid, which have been effective in mitigating the burning sensation. She experiences mild abdominal pain, which she monitors closely, but denies any sharp or doubling pain. She describes the pain as akin to nausea, but does not believe it is an ulcer. Her symptoms do not present upon waking, but are exacerbated when she has not consumed food for an extended period. Her symptoms are exacerbated by cream in her coffee, sugar, and spicy foods. She has never been diagnosed with H. pylori. Despite eliminating sugar from her diet, her symptoms persist. She has attempted to manage her symptoms with probiotics, but to no avail. She sleeps on a wedge and experiences loose stools when consuming aloe vera.    The patient's blood pressure is slightly elevated today, which she attributes to anxiety about the appointment. She has been attempting to manage this through breathing exercises. She was previously on a diet, which resulted in a weight reduction to 208 pounds, but has since increased to 215 pounds. She is considering restarting the diet, which she believes helps manage her acid reflux and blood pressure.    The patient uses clindamycin during her dental visits, prescribed by Dr. Ortiz, who performed her knee surgery. She is scheduled for a dental cleaning next week and requires 2 more clindamycin tablets.    The patient continues to take Singulair twice daily. She attempts to reduce the dosage to once daily due to dry  eyes. If she attempts to increase the dosage to once daily, she experiences a sinus infection, leading her to discontinue its use.    Supplemental Information  She takes meloxicam for her back and knee pain. Her yeast vaginitis is not a current issue. She uses wipes intermittently, which seems to take care of it. Her depression is stable. She has talked to a therapist for 18 months.     has a past medical history of Allergy, Anesthesia, Arrhythmia, Arthritis, Blood clotting disorder (HCC), Elevated blood sugar, Heart burn, Hypertension, Indigestion, Pain (2014), PONV (postoperative nausea and vomiting), and Unspecified cataract.    She has no past medical history of Cold or Dental disorder.   has a past surgical history that includes carpal tunnel release (2014); carpal tunnel release (2014); tubal ligation; carpal tunnel release (); appendectomy (); gyn surgery (); other (); other (Left, ); other (Left, Around ); pr nasal scopy,rmv tiss maxill sinus (Left, 2021); pr total knee arthroplasty (Left, 2022); abdominal hysterectomy total; arthroplasty; eye surgery; and tubal coagulation laparoscopic bilateral.    Family History   Problem Relation Age of Onset    Alcohol abuse Mother         liver issues    Cancer Father         lung and colon    Stroke Sister     Hypertension Sister     Breast Cancer Maternal Aunt 78       Social History     Socioeconomic History    Marital status:      Spouse name: Not on file    Number of children: Not on file    Years of education: Not on file    Highest education level: Some college, no degree   Occupational History    Not on file   Tobacco Use    Smoking status: Former     Current packs/day: 0.00     Types: Cigarettes     Start date: 1964     Quit date: 1966     Years since quittin.4    Smokeless tobacco: Never   Vaping Use    Vaping Use: Never used   Substance and Sexual Activity    Alcohol use: No    Drug  use: No    Sexual activity: Not Currently     Partners: Male     Birth control/protection: Pill, Diaphragm, I.U.D.   Other Topics Concern    Not on file   Social History Narrative    Not on file     Social Determinants of Health     Financial Resource Strain: Medium Risk (12/2/2022)    Overall Financial Resource Strain (CARDIA)     Difficulty of Paying Living Expenses: Somewhat hard   Food Insecurity: No Food Insecurity (12/2/2022)    Hunger Vital Sign     Worried About Running Out of Food in the Last Year: Never true     Ran Out of Food in the Last Year: Never true   Transportation Needs: No Transportation Needs (12/2/2022)    PRAPARE - Transportation     Lack of Transportation (Medical): No     Lack of Transportation (Non-Medical): No   Physical Activity: Inactive (12/2/2022)    Exercise Vital Sign     Days of Exercise per Week: 0 days     Minutes of Exercise per Session: 0 min   Stress: Stress Concern Present (12/2/2022)    [employer] [place] [employer] - Occupational Stress Questionnaire     Feeling of Stress : Rather much   Social Connections: Socially Isolated (12/2/2022)    Social Connection and Isolation Panel [NHANES]     Frequency of Communication with Friends and Family: More than three times a week     Frequency of Social Gatherings with Friends and Family: Once a week     Attends Judaism Services: Never     Active Member of Clubs or Organizations: No     Attends Club or Organization Meetings: Never     Marital Status:    Intimate Partner Violence: Not on file   Housing Stability: Low Risk  (12/2/2022)    Housing Stability Vital Sign     Unable to Pay for Housing in the Last Year: No     Number of Places Lived in the Last Year: 1     Unstable Housing in the Last Year: No       Patient Active Problem List    Diagnosis Date Noted    Prediabetes 05/13/2024    Current mild episode of major depressive disorder without prior episode (HCC) 11/13/2023    Obesity (BMI 30.0-34.9) 05/09/2023     Gastroesophageal reflux disease without esophagitis 05/09/2023    Takes dietary supplements 02/07/2023    Primary hypertension 01/12/2023    Asymptomatic varicose veins of left lower extremity 01/12/2023    Arthritis 12/05/2022    PVC's (premature ventricular contractions) 12/05/2022    White coat syndrome with diagnosis of hypertension 12/05/2022    Status post total left knee replacement 06/27/2022    Osteopenia of multiple sites 12/01/2021    Environmental and seasonal allergies 12/01/2021    Elevated fasting glucose 12/01/2021    Carpal tunnel syndrome 11/15/2013    Brachial neuritis 11/15/2013         Current Outpatient Medications Ordered in Epic   Medication Sig Dispense Refill    clindamycin (CLEOCIN) 300 MG Cap Take two capsules by mouth 1 hour prior to dental work 10 Capsule 0    famotidine (PEPCID) 40 MG Tab Take 1 Tablet by mouth 2 times a day. 180 Tablet 0    meloxicam (MOBIC) 15 MG tablet TAKE 1 TABLET BY MOUTH EVERY DAY 90 Tablet 0    lisinopril (PRINIVIL) 10 MG Tab TAKE 1 TABLET BY MOUTH EVERY DAY 90 Tablet 3    fluconazole (DIFLUCAN) 150 MG tablet Take 1 Tablet by mouth every day. 3 Tablet 0    docosahexanoic acid (OMEGA 3 FA) 1000 MG Cap       Cyanocobalamin (VITAMIN B-12 PO) Take  by mouth every day.      Coenzyme Q10 (COQ10 PO) Take  by mouth every day.      MAGNESIUM PO Take  by mouth every day.      CALCIUM PO Take  by mouth every day.      Cholecalciferol (VITAMIN D3 PO) Take  by mouth every day.      BIOTIN PO Take  by mouth every day.      S-Adenosylmethionine (SAME) 400 MG Tab Take  by mouth.      Misc Natural Products (OSTEO BI-FLEX ADV DOUBLE ST PO) Take 2 Tablets by mouth every day.      CORAL CALCIUM PO Take  by mouth.      CINNAMON PO Take  by mouth.      Menaquinone-7 (VITAMIN K2 PO) Take  by mouth.      CRANBERRY PO Take  by mouth.      Probiotic Product (PROBIOTIC PO) Take 2 Capsules by mouth every day.      Non Formulary Request 2 Capsules 2 (two) times a day. [facility]       "montelukast (SINGULAIR) 10 MG Tab TAKE 1 TABLET BY MOUTH ONE TIME DAILY AT BEDTIME      fluticasone (FLONASE) 50 MCG/ACT nasal spray fluticasone propionate 50 mcg/actuation nasal spray,suspension      Calcium Carbonate Antacid 850 MG Chew Tab Take 1 Tab by mouth.      acetaminophen (TYLENOL) 500 MG TABS Take 1,000 mg by mouth every 6 hours as needed.      cetirizine (ZYRTEC) 10 MG TABS Take 10 mg by mouth every day.       No current [facility]-ordered facility-administered medications on file.     Allergies   Allergen Reactions    Codeine Nausea    Penicillins Hives    Sulfa Drugs Rash and Itching    Morphine Nausea     confusion    Trimethoprim Rash and Itching     Septra       Health Maintenance: follow-up for AWV    Objective:     Exam:  BP (!) 160/88 (BP Location: Left arm, Patient Position: Sitting, BP Cuff Size: Adult)   Pulse 83   Temp 36.3 °C (97.4 °F) (Temporal)   Ht 1.753 m (5' 9\")   Wt 97.7 kg (215 lb 6.4 oz)   SpO2 97%   BMI 31.81 kg/m²  Body mass index is 31.81 kg/m².    Physical Exam  Constitutional:       Appearance: Normal appearance.   HENT:      Mouth/Throat:        Comments: Small area of redness swelling, ulceration in the center of palate patient will follow-up with her dentist regarding this issue  Cardiovascular:      Rate and Rhythm: Normal rate and regular rhythm.      Pulses: Normal pulses.   Pulmonary:      Effort: Pulmonary effort is normal.   Abdominal:      General: Abdomen is flat.   Skin:     General: Skin is warm and dry.      Capillary Refill: Capillary refill takes less than 2 seconds.   Neurological:      General: No focal deficit present.      Mental Status: She is alert and oriented to person, place, and time.       Results  Laboratory Studies  A1c is 5.8. Cholesterol labs look great.    Assessment & Plan:     1. Gastroesophageal reflux disease without esophagitis  H.PYLORI STOOL ANTIGEN      2. Obesity (BMI 30.0-34.9)  Patient identified as having weight management issue.  " Appropriate orders and counseling given.      3. Presence of left artificial knee joint  clindamycin (CLEOCIN) 300 MG Cap      4. Prediabetes        5. Primary hypertension  HEMOGLOBIN A1C    CBC WITH DIFFERENTIAL    Comp Metabolic Panel      6. Current mild episode of major depressive disorder without prior episode (HCC)            Assessment & Plan  1. Gastroesophageal reflux disease (GERD).  The patient's symptoms do not appear to be indicative of an ulcer, but rather indicative of repetitive acid reflux. A test for H. pylori will be conducted. Should the H. pylori test yield a positive result, an antibiotic treatment will be initiated. If the result is negative, a 2-week regimen of a proton pump inhibitor will be considered. If her symptoms persist beyond this period, a referral to a gastroenterologist for an endoscopy may be considered.    2. Hypertension.  The patient's blood pressure is slightly elevated today. The patient has been advised to discontinue SAMe due to its potential to elevate her blood pressure. Recheck of BP is recommended.    3.  Dental prophylaxis.  A prescription for clindamycin has been refilled.    4.  Prediabetes  A1c is stable at 5.8 patient will continue working on healthy diet and exercise    Follow-up  The patient is scheduled for a follow-up visit in 6 months for an annual wellness visit.    HCC Gap Form    Diagnosis to address: F32.0 - Current mild episode of major depressive disorder without prior episode (HCC)  Assessment and plan: Chronic, stable. Continue with current defined treatment plan: states symptoms improved. She has a therapist. Graduated from therapy recently. Follow-up at least annually.  Last edited 05/13/24 13:18 PDT by Mark Reddy, A.P.R.N.         I have placed the below orders and discussed them with an approved delegating provider. The MA is performing the below orders under the direction of Dr. Lucio.      Return in about 6 months (around  11/13/2024) for AWV.    Please note that this dictation was created using voice recognition software. I have made every reasonable attempt to correct obvious errors, but I expect that there are errors of grammar and possibly content that I did not discover before finalizing the note.

## 2024-05-14 ENCOUNTER — HOSPITAL ENCOUNTER (OUTPATIENT)
Facility: MEDICAL CENTER | Age: 79
End: 2024-05-14
Attending: NURSE PRACTITIONER
Payer: MEDICARE

## 2024-05-14 DIAGNOSIS — K21.9 GASTROESOPHAGEAL REFLUX DISEASE WITHOUT ESOPHAGITIS: ICD-10-CM

## 2024-05-14 LAB — H PYLORI AG STL QL IA: NOT DETECTED

## 2024-05-16 DIAGNOSIS — K21.9 GASTROESOPHAGEAL REFLUX DISEASE, UNSPECIFIED WHETHER ESOPHAGITIS PRESENT: ICD-10-CM

## 2024-05-16 RX ORDER — OMEPRAZOLE 20 MG/1
20 CAPSULE, DELAYED RELEASE ORAL DAILY
Qty: 90 CAPSULE | Refills: 0 | Status: SHIPPED | OUTPATIENT
Start: 2024-05-16

## 2024-06-29 DIAGNOSIS — M19.90 ARTHRITIS: ICD-10-CM

## 2024-06-29 DIAGNOSIS — K21.9 GASTROESOPHAGEAL REFLUX DISEASE WITHOUT ESOPHAGITIS: ICD-10-CM

## 2024-07-01 RX ORDER — FAMOTIDINE 40 MG/1
40 TABLET, FILM COATED ORAL 2 TIMES DAILY
Qty: 180 TABLET | Refills: 0 | Status: SHIPPED | OUTPATIENT
Start: 2024-07-01

## 2024-07-01 RX ORDER — MELOXICAM 15 MG/1
15 TABLET ORAL DAILY
Qty: 90 TABLET | Refills: 0 | Status: SHIPPED | OUTPATIENT
Start: 2024-07-01

## 2024-07-29 ENCOUNTER — DOCUMENTATION (OUTPATIENT)
Dept: HEALTH INFORMATION MANAGEMENT | Facility: OTHER | Age: 79
End: 2024-07-29
Payer: MEDICARE

## 2024-09-27 DIAGNOSIS — K21.9 GASTROESOPHAGEAL REFLUX DISEASE WITHOUT ESOPHAGITIS: ICD-10-CM

## 2024-09-27 DIAGNOSIS — M19.90 ARTHRITIS: ICD-10-CM

## 2024-09-27 NOTE — TELEPHONE ENCOUNTER
Received request via: Pharmacy    Was the patient seen in the last year in this department? Yes    Does the patient have an active prescription (recently filled or refills available) for medication(s) requested? No    Pharmacy Name: [facility]    Does the patient have snf Plus and need 100-day supply? (This applies to ALL medications) Yes, quantity updated to 100 days

## 2024-09-30 RX ORDER — FAMOTIDINE 40 MG/1
40 TABLET, FILM COATED ORAL 2 TIMES DAILY
Qty: 180 TABLET | Refills: 0 | Status: SHIPPED | OUTPATIENT
Start: 2024-09-30

## 2024-09-30 RX ORDER — MELOXICAM 15 MG/1
15 TABLET ORAL DAILY
Qty: 90 TABLET | Refills: 0 | Status: SHIPPED | OUTPATIENT
Start: 2024-09-30

## 2024-11-18 ENCOUNTER — HOSPITAL ENCOUNTER (OUTPATIENT)
Dept: LAB | Facility: MEDICAL CENTER | Age: 79
End: 2024-11-18
Attending: NURSE PRACTITIONER
Payer: MEDICARE

## 2024-11-18 DIAGNOSIS — I10 PRIMARY HYPERTENSION: ICD-10-CM

## 2024-11-18 LAB
ALBUMIN SERPL BCP-MCNC: 4 G/DL (ref 3.2–4.9)
ALBUMIN/GLOB SERPL: 1.7 G/DL
ALP SERPL-CCNC: 64 U/L (ref 30–99)
ALT SERPL-CCNC: 19 U/L (ref 2–50)
ANION GAP SERPL CALC-SCNC: 11 MMOL/L (ref 7–16)
AST SERPL-CCNC: 26 U/L (ref 12–45)
BASOPHILS # BLD AUTO: 0.6 % (ref 0–1.8)
BASOPHILS # BLD: 0.04 K/UL (ref 0–0.12)
BILIRUB SERPL-MCNC: 0.5 MG/DL (ref 0.1–1.5)
BUN SERPL-MCNC: 14 MG/DL (ref 8–22)
CALCIUM ALBUM COR SERPL-MCNC: 9.6 MG/DL (ref 8.5–10.5)
CALCIUM SERPL-MCNC: 9.6 MG/DL (ref 8.5–10.5)
CHLORIDE SERPL-SCNC: 105 MMOL/L (ref 96–112)
CO2 SERPL-SCNC: 22 MMOL/L (ref 20–33)
CREAT SERPL-MCNC: 0.8 MG/DL (ref 0.5–1.4)
EOSINOPHIL # BLD AUTO: 0.25 K/UL (ref 0–0.51)
EOSINOPHIL NFR BLD: 3.5 % (ref 0–6.9)
ERYTHROCYTE [DISTWIDTH] IN BLOOD BY AUTOMATED COUNT: 43.2 FL (ref 35.9–50)
EST. AVERAGE GLUCOSE BLD GHB EST-MCNC: 123 MG/DL
GFR SERPLBLD CREATININE-BSD FMLA CKD-EPI: 75 ML/MIN/1.73 M 2
GLOBULIN SER CALC-MCNC: 2.4 G/DL (ref 1.9–3.5)
GLUCOSE SERPL-MCNC: 97 MG/DL (ref 65–99)
HBA1C MFR BLD: 5.9 % (ref 4–5.6)
HCT VFR BLD AUTO: 41.8 % (ref 37–47)
HGB BLD-MCNC: 13.6 G/DL (ref 12–16)
IMM GRANULOCYTES # BLD AUTO: 0.02 K/UL (ref 0–0.11)
IMM GRANULOCYTES NFR BLD AUTO: 0.3 % (ref 0–0.9)
LYMPHOCYTES # BLD AUTO: 2.23 K/UL (ref 1–4.8)
LYMPHOCYTES NFR BLD: 30.8 % (ref 22–41)
MCH RBC QN AUTO: 29.6 PG (ref 27–33)
MCHC RBC AUTO-ENTMCNC: 32.5 G/DL (ref 32.2–35.5)
MCV RBC AUTO: 91.1 FL (ref 81.4–97.8)
MONOCYTES # BLD AUTO: 0.67 K/UL (ref 0–0.85)
MONOCYTES NFR BLD AUTO: 9.3 % (ref 0–13.4)
NEUTROPHILS # BLD AUTO: 4.03 K/UL (ref 1.82–7.42)
NEUTROPHILS NFR BLD: 55.5 % (ref 44–72)
NRBC # BLD AUTO: 0 K/UL
NRBC BLD-RTO: 0 /100 WBC (ref 0–0.2)
PLATELET # BLD AUTO: 239 K/UL (ref 164–446)
PMV BLD AUTO: 12.5 FL (ref 9–12.9)
POTASSIUM SERPL-SCNC: 5 MMOL/L (ref 3.6–5.5)
PROT SERPL-MCNC: 6.4 G/DL (ref 6–8.2)
RBC # BLD AUTO: 4.59 M/UL (ref 4.2–5.4)
SODIUM SERPL-SCNC: 138 MMOL/L (ref 135–145)
WBC # BLD AUTO: 7.2 K/UL (ref 4.8–10.8)

## 2024-11-18 PROCEDURE — 85025 COMPLETE CBC W/AUTO DIFF WBC: CPT

## 2024-11-18 PROCEDURE — 83036 HEMOGLOBIN GLYCOSYLATED A1C: CPT

## 2024-11-18 PROCEDURE — 80053 COMPREHEN METABOLIC PANEL: CPT

## 2024-11-18 PROCEDURE — 36415 COLL VENOUS BLD VENIPUNCTURE: CPT

## 2024-11-18 SDOH — ECONOMIC STABILITY: INCOME INSECURITY: HOW HARD IS IT FOR YOU TO PAY FOR THE VERY BASICS LIKE FOOD, HOUSING, MEDICAL CARE, AND HEATING?: NOT VERY HARD

## 2024-11-18 SDOH — ECONOMIC STABILITY: FOOD INSECURITY: WITHIN THE PAST 12 MONTHS, YOU WORRIED THAT YOUR FOOD WOULD RUN OUT BEFORE YOU GOT MONEY TO BUY MORE.: NEVER TRUE

## 2024-11-18 SDOH — HEALTH STABILITY: PHYSICAL HEALTH: ON AVERAGE, HOW MANY MINUTES DO YOU ENGAGE IN EXERCISE AT THIS LEVEL?: 10 MIN

## 2024-11-18 SDOH — ECONOMIC STABILITY: INCOME INSECURITY: IN THE LAST 12 MONTHS, WAS THERE A TIME WHEN YOU WERE NOT ABLE TO PAY THE MORTGAGE OR RENT ON TIME?: NO

## 2024-11-18 SDOH — ECONOMIC STABILITY: FOOD INSECURITY: WITHIN THE PAST 12 MONTHS, THE FOOD YOU BOUGHT JUST DIDN'T LAST AND YOU DIDN'T HAVE MONEY TO GET MORE.: NEVER TRUE

## 2024-11-18 SDOH — HEALTH STABILITY: PHYSICAL HEALTH: ON AVERAGE, HOW MANY DAYS PER WEEK DO YOU ENGAGE IN MODERATE TO STRENUOUS EXERCISE (LIKE A BRISK WALK)?: 1 DAY

## 2024-11-18 SDOH — HEALTH STABILITY: MENTAL HEALTH
STRESS IS WHEN SOMEONE FEELS TENSE, NERVOUS, ANXIOUS, OR CAN'T SLEEP AT NIGHT BECAUSE THEIR MIND IS TROUBLED. HOW STRESSED ARE YOU?: TO SOME EXTENT

## 2024-11-18 ASSESSMENT — SOCIAL DETERMINANTS OF HEALTH (SDOH)
HOW OFTEN DO YOU HAVE A DRINK CONTAINING ALCOHOL: NEVER
IN A TYPICAL WEEK, HOW MANY TIMES DO YOU TALK ON THE PHONE WITH FAMILY, FRIENDS, OR NEIGHBORS?: MORE THAN THREE TIMES A WEEK
HOW OFTEN DO YOU HAVE SIX OR MORE DRINKS ON ONE OCCASION: NEVER
HOW OFTEN DO YOU GET TOGETHER WITH FRIENDS OR RELATIVES?: ONCE A WEEK
HOW OFTEN DO YOU ATTENT MEETINGS OF THE CLUB OR ORGANIZATION YOU BELONG TO?: NEVER
HOW MANY DRINKS CONTAINING ALCOHOL DO YOU HAVE ON A TYPICAL DAY WHEN YOU ARE DRINKING: PATIENT DOES NOT DRINK
DO YOU BELONG TO ANY CLUBS OR ORGANIZATIONS SUCH AS CHURCH GROUPS UNIONS, FRATERNAL OR ATHLETIC GROUPS, OR SCHOOL GROUPS?: NO
HOW OFTEN DO YOU ATTEND CHURCH OR RELIGIOUS SERVICES?: NEVER
HOW OFTEN DO YOU GET TOGETHER WITH FRIENDS OR RELATIVES?: ONCE A WEEK
HOW OFTEN DO YOU ATTENT MEETINGS OF THE CLUB OR ORGANIZATION YOU BELONG TO?: NEVER
WITHIN THE PAST 12 MONTHS, YOU WORRIED THAT YOUR FOOD WOULD RUN OUT BEFORE YOU GOT THE MONEY TO BUY MORE: NEVER TRUE
HOW OFTEN DO YOU ATTEND CHURCH OR RELIGIOUS SERVICES?: NEVER
IN THE PAST 12 MONTHS, HAS THE ELECTRIC, GAS, OIL, OR WATER COMPANY THREATENED TO SHUT OFF SERVICE IN YOUR HOME?: NO
DO YOU BELONG TO ANY CLUBS OR ORGANIZATIONS SUCH AS CHURCH GROUPS UNIONS, FRATERNAL OR ATHLETIC GROUPS, OR SCHOOL GROUPS?: NO
HOW HARD IS IT FOR YOU TO PAY FOR THE VERY BASICS LIKE FOOD, HOUSING, MEDICAL CARE, AND HEATING?: NOT VERY HARD
IN A TYPICAL WEEK, HOW MANY TIMES DO YOU TALK ON THE PHONE WITH FAMILY, FRIENDS, OR NEIGHBORS?: MORE THAN THREE TIMES A WEEK

## 2024-11-18 ASSESSMENT — LIFESTYLE VARIABLES
HOW OFTEN DO YOU HAVE SIX OR MORE DRINKS ON ONE OCCASION: NEVER
HOW MANY STANDARD DRINKS CONTAINING ALCOHOL DO YOU HAVE ON A TYPICAL DAY: PATIENT DOES NOT DRINK
AUDIT-C TOTAL SCORE: 0
SKIP TO QUESTIONS 9-10: 1
HOW OFTEN DO YOU HAVE A DRINK CONTAINING ALCOHOL: NEVER

## 2024-11-21 ENCOUNTER — OFFICE VISIT (OUTPATIENT)
Dept: MEDICAL GROUP | Facility: PHYSICIAN GROUP | Age: 79
End: 2024-11-21
Payer: MEDICARE

## 2024-11-21 VITALS
SYSTOLIC BLOOD PRESSURE: 138 MMHG | TEMPERATURE: 98.3 F | WEIGHT: 208.6 LBS | HEIGHT: 69 IN | OXYGEN SATURATION: 98 % | DIASTOLIC BLOOD PRESSURE: 82 MMHG | BODY MASS INDEX: 30.9 KG/M2 | HEART RATE: 86 BPM

## 2024-11-21 DIAGNOSIS — K21.9 GASTROESOPHAGEAL REFLUX DISEASE WITHOUT ESOPHAGITIS: ICD-10-CM

## 2024-11-21 DIAGNOSIS — M54.16 LUMBAR RADICULOPATHY: ICD-10-CM

## 2024-11-21 DIAGNOSIS — R73.03 PREDIABETES: ICD-10-CM

## 2024-11-21 DIAGNOSIS — Z23 NEED FOR VACCINATION: ICD-10-CM

## 2024-11-21 DIAGNOSIS — M19.90 ARTHRITIS: ICD-10-CM

## 2024-11-21 DIAGNOSIS — Z00.00 MEDICARE ANNUAL WELLNESS VISIT, SUBSEQUENT: Primary | ICD-10-CM

## 2024-11-21 DIAGNOSIS — I10 PRIMARY HYPERTENSION: ICD-10-CM

## 2024-11-21 DIAGNOSIS — I10 WHITE COAT SYNDROME WITH DIAGNOSIS OF HYPERTENSION: ICD-10-CM

## 2024-11-21 PROBLEM — M54.50 LUMBAR PAIN: Status: ACTIVE | Noted: 2024-11-21

## 2024-11-21 PROBLEM — F32.0 CURRENT MILD EPISODE OF MAJOR DEPRESSIVE DISORDER WITHOUT PRIOR EPISODE (HCC): Status: RESOLVED | Noted: 2023-11-13 | Resolved: 2024-11-21

## 2024-11-21 PROBLEM — R73.01 ELEVATED FASTING GLUCOSE: Status: RESOLVED | Noted: 2021-12-01 | Resolved: 2024-11-21

## 2024-11-21 PROCEDURE — 90662 IIV NO PRSV INCREASED AG IM: CPT | Performed by: NURSE PRACTITIONER

## 2024-11-21 PROCEDURE — G0439 PPPS, SUBSEQ VISIT: HCPCS | Mod: 25 | Performed by: NURSE PRACTITIONER

## 2024-11-21 PROCEDURE — 3075F SYST BP GE 130 - 139MM HG: CPT | Performed by: NURSE PRACTITIONER

## 2024-11-21 PROCEDURE — 3079F DIAST BP 80-89 MM HG: CPT | Performed by: NURSE PRACTITIONER

## 2024-11-21 PROCEDURE — G0008 ADMIN INFLUENZA VIRUS VAC: HCPCS | Performed by: NURSE PRACTITIONER

## 2024-11-21 ASSESSMENT — PATIENT HEALTH QUESTIONNAIRE - PHQ9
3. TROUBLE FALLING OR STAYING ASLEEP OR SLEEPING TOO MUCH: NOT AT ALL
1. LITTLE INTEREST OR PLEASURE IN DOING THINGS: NOT AT ALL
2. FEELING DOWN, DEPRESSED, IRRITABLE, OR HOPELESS: NOT AT ALL
CLINICAL INTERPRETATION OF PHQ2 SCORE: 0
4. FEELING TIRED OR HAVING LITTLE ENERGY: NOT AT ALL
5. POOR APPETITE OR OVEREATING: NOT AT ALL
SUM OF ALL RESPONSES TO PHQ QUESTIONS 1-9: 0
9. THOUGHTS THAT YOU WOULD BE BETTER OFF DEAD, OR OF HURTING YOURSELF: NOT AT ALL
8. MOVING OR SPEAKING SO SLOWLY THAT OTHER PEOPLE COULD HAVE NOTICED. OR THE OPPOSITE, BEING SO FIGETY OR RESTLESS THAT YOU HAVE BEEN MOVING AROUND A LOT MORE THAN USUAL: NOT AT ALL
6. FEELING BAD ABOUT YOURSELF - OR THAT YOU ARE A FAILURE OR HAVE LET YOURSELF OR YOUR FAMILY DOWN: NOT AL ALL
SUM OF ALL RESPONSES TO PHQ9 QUESTIONS 1 AND 2: 0
7. TROUBLE CONCENTRATING ON THINGS, SUCH AS READING THE NEWSPAPER OR WATCHING TELEVISION: NOT AT ALL

## 2024-11-21 ASSESSMENT — ENCOUNTER SYMPTOMS: GENERAL WELL-BEING: GOOD

## 2024-11-21 ASSESSMENT — FIBROSIS 4 INDEX: FIB4 SCORE: 1.95

## 2024-11-21 ASSESSMENT — ACTIVITIES OF DAILY LIVING (ADL): BATHING_REQUIRES_ASSISTANCE: 0

## 2024-11-21 NOTE — PROGRESS NOTES
Chief Complaint   Patient presents with    Medicare Annual Wellness       HPI:  Giselle Ramon is a 79 y.o. here for Medicare Annual Wellness Visit     History of Present Illness  The patient presents for an annual wellness visit.    She reports a gradual worsening of her arthritis, particularly in her back. She experiences numbness in her leg after prolonged standing, which subsides upon sitting. This issue has been present for approximately 5 years. She also experiences occasional numbness in her other leg. She has a history of degenerative spine disease, inherited from her mother, and has previously consulted with Dr. Hedrick who advised against surgery. Her mobility is limited due to her condition, requiring frequent rest during walks. Despite attempts to strengthen her core, she has not seen improvement. She tries to maintain some level of physical activity by walking around her apartment complex when weather permits. She does not experience any tenderness but occasionally has a burning sensation that can disrupt her sleep. She occasionally experiences urgency to use the bathroom, which she believes may be related to her back condition.    She reports excessive mucus production in her throat, which she attributes to post-nasal drip. She is currently receiving allergy injections.    She has lost nearly 10 pounds and does not monitor her blood pressure at home. She has no concerns regarding her screenings and does not believe she requires a DEXA scan or mammogram.    She is currently taking Pepcid and allopurinol, which she finds helpful.    FAMILY HISTORY  Her mother's sister had breast cancer.        Patient Active Problem List    Diagnosis Date Noted    Lumbar radiculopathy 11/21/2024    Prediabetes 05/13/2024    Obesity (BMI 30.0-34.9) 05/09/2023    Gastroesophageal reflux disease without esophagitis 05/09/2023    Takes dietary supplements 02/07/2023    Primary hypertension 01/12/2023    Asymptomatic  varicose veins of left lower extremity 01/12/2023    Arthritis 12/05/2022    PVC's (premature ventricular contractions) 12/05/2022    White coat syndrome with diagnosis of hypertension 12/05/2022    Status post total left knee replacement 06/27/2022    Osteopenia of multiple sites 12/01/2021    Environmental and seasonal allergies 12/01/2021    Carpal tunnel syndrome 11/15/2013    Brachial neuritis 11/15/2013       Current Outpatient Medications   Medication Sig Dispense Refill    meloxicam (MOBIC) 15 MG tablet TAKE 1 TABLET BY MOUTH EVERY DAY 90 Tablet 0    famotidine (PEPCID) 40 MG Tab TAKE 1 TABLET BY MOUTH TWICE DAILY 180 Tablet 0    meloxicam (MOBIC) 15 MG tablet TAKE 1 TABLET BY MOUTH EVERY DAY 90 Tablet 0    clindamycin (CLEOCIN) 300 MG Cap Take two capsules by mouth 1 hour prior to dental work 10 Capsule 0    lisinopril (PRINIVIL) 10 MG Tab TAKE 1 TABLET BY MOUTH EVERY DAY 90 Tablet 3    fluconazole (DIFLUCAN) 150 MG tablet Take 1 Tablet by mouth every day. 3 Tablet 0    docosahexanoic acid (OMEGA 3 FA) 1000 MG Cap       Cyanocobalamin (VITAMIN B-12 PO) Take  by mouth every day.      Coenzyme Q10 (COQ10 PO) Take  by mouth every day.      MAGNESIUM PO Take  by mouth every day.      CALCIUM PO Take  by mouth every day.      Cholecalciferol (VITAMIN D3 PO) Take  by mouth every day.      BIOTIN PO Take  by mouth every day.      [facility] Products (OSTEO BI-FLEX ADV DOUBLE ST PO) Take 2 Tablets by mouth every day.      CORAL CALCIUM PO Take  by mouth.      CINNAMON PO Take  by mouth.      Menaquinone-7 (VITAMIN K2 PO) Take  by mouth.      CRANBERRY PO Take  by mouth.      Non Formulary Request 2 Capsules 2 (two) times a day. [facility]      montelukast (SINGULAIR) 10 MG Tab TAKE 1 TABLET BY MOUTH ONE TIME DAILY AT BEDTIME      fluticasone (FLONASE) 50 MCG/ACT nasal spray fluticasone propionate 50 mcg/actuation nasal spray,suspension      Calcium Carbonate Antacid 850 MG Chew Tab Take 1 Tab by mouth.       acetaminophen (TYLENOL) 500 MG TABS Take 1,000 mg by mouth every 6 hours as needed.      cetirizine (ZYRTEC) 10 MG TABS Take 10 mg by mouth every day.       No current facility-administered medications for this visit.          Current supplements as per medication list.     Allergies: Codeine, Penicillins, Sulfa drugs, Morphine, and Trimethoprim    Current social contact/activities: friends and family     She  reports that she quit smoking about 58 years ago. Her smoking use included cigarettes. She started smoking about 60 years ago. She has never used smokeless tobacco. She reports that she does not drink alcohol and does not use drugs.  Counseling given: Not Answered      ROS:    Gait: Uses a cane, as needed for long distances   Ostomy: No  Other tubes: No  Amputations: No  Chronic oxygen use: No  Last eye exam: about 1 month ago   Wears hearing aids: No   : Denies any urinary leakage during the last 6 months    Screening:    Depression Screening  Little interest or pleasure in doing things?  0 - not at all  Feeling down, depressed , or hopeless? 0 - not at all  Trouble falling or staying asleep, or sleeping too much?     Feeling tired or having little energy?     Poor appetite or overeating?     Feeling bad about yourself - or that you are a failure or have let yourself or your family down?    Trouble concentrating on things, such as reading the newspaper or watching television?    Moving or speaking so slowly that other people could have noticed.  Or the opposite - being so fidgety or restless that you have been moving around a lot more than usual?     Thoughts that you would be better off dead, or of hurting yourself?     Patient Health Questionnaire Score:      If depressive symptoms identified deferred to follow up visit unless specifically addressed in assessment and plan.    Interpretation of PHQ-9 Total Score   Score Severity   1-4 No Depression   5-9 Mild Depression   10-14 Moderate Depression   15-19  Moderately Severe Depression   20-27 Severe Depression    Screening for Cognitive Impairment  Do you or any of your friends or family members have any concern about your memory? No  Three Minute Recall (Leader, Season, Table) 3/3    Alonso clock face with all 12 numbers and set the hands to show 10 minutes after 11.  Yes    Cognitive concerns identified deferred for follow up unless specifically addressed in assessment and plan.    Fall Risk Assessment  Has the patient had two or more falls in the last year or any fall with injury in the last year?  No    Safety Assessment  Do you always wear your seatbelt?  Yes  Any changes to home needed to function safely? No  Difficulty hearing.  No  Patient counseled about all safety risks that were identified.    Functional Assessment ADLs  Are there any barriers preventing you from cooking for yourself or meeting nutritional needs?  No.    Are there any barriers preventing you from driving safely or obtaining transportation?  No.    Are there any barriers preventing you from using a telephone or calling for help?  No    Are there any barriers preventing you from shopping?  No.    Are there any barriers preventing you from taking care of your own finances?  No    Are there any barriers preventing you from managing your medications?  No    Are there any barriers preventing you from showering, bathing or dressing yourself? No    Are there any barriers preventing you from doing housework or laundry? No    Are there any barriers preventing you from using the toilet?No    Are you currently engaging in any exercise or physical activity?  Yes.      Self-Assessment of Health  What is your perception of your health? Good    Do you sleep more than six hours a night? No    In the past 7 days, how much did pain keep you from doing your normal work? Some    Do you spend quality time with family or friends (virtually or in person)? Yes    Do you usually eat a heart healthy diet that tiffany  of a variety of fruits, vegetables, whole grains and fiber? Yes    Do you eat foods high in fat and/or Fast Food more than three times per week? No    How concerned are you that your medical conditions are not being well managed? Not at all    Are you worried that in the next 2 months, you may not have stable housing that you own, rent, or stay in as part of a household? No        Advance Care Planning  Do you have an Advance Directive, Living Will, Durable Power of , or POLST? Yes  Advance Directive       is on file      Health Maintenance Summary            Overdue - Annual Wellness Visit (Yearly) Overdue since 12/22/2023 12/22/2022  Level of Service: MA ANNUAL WELLNESS VISIT-INCLUDES PPPS SUBSEQUE*    12/22/2022  Visit Dx: Medicare annual wellness visit, subsequent              Postponed - Zoster (Shingles) Vaccines (1 of 2) Postponed until 4/21/2025      No completion history exists for this topic.              Postponed - COVID-19 Vaccine (3 - 2024-25 season) Postponed until 11/21/2025 04/25/2021  Imm Admin: PFIZER PURPLE CAP SARS-COV-2 VACCINATION (12+)    04/04/2021  Imm Admin: PFIZER PURPLE CAP SARS-COV-2 VACCINATION (12+)              Bone Density Scan (Every 5 Years) Next due on 5/26/2026 05/26/2021  DS-BONE DENSITY STUDY (DEXA)              IMM DTaP/Tdap/Td Vaccine (2 - Td or Tdap) Next due on 5/9/2033 05/09/2023  Imm Admin: Tdap Vaccine              Hepatitis C Screening  Completed      01/09/2023  Hepatitis C Antibody component of HEP C VIRUS ANTIBODY              Pneumococcal Vaccine: 65+ Years (Series Information) Completed      05/09/2023  Imm Admin: Pneumococcal polysaccharide vaccine (PPSV-23)    05/06/2019  Imm Admin: Pneumococcal Conjugate Vaccine (Prevnar/PCV-13)              Influenza Vaccine (Series Information) Completed      11/21/2024  Imm Admin: Influenza high-dose trivalent (PF)    11/13/2023  Imm Admin: Influenza Vaccine Adult HD    12/05/2022  Imm Admin:  Influenza Vaccine Adult HD    2021  Imm Admin: Influenza Vaccine Adult HD              Hepatitis A Vaccine (Hep A) (Series Information) Aged Out      No completion history exists for this topic.              Hepatitis B Vaccine (Hep B) (Series Information) Aged Out      No completion history exists for this topic.              HPV Vaccines (Series Information) Aged Out      No completion history exists for this topic.              Polio Vaccine (Inactivated Polio) (Series Information) Aged Out      No completion history exists for this topic.              Meningococcal Immunization (Series Information) Aged Out      No completion history exists for this topic.              Discontinued - Mammogram  Discontinued        Frequency changed to Never automatically (Topic No Longer Applies)    05/15/2023  MA-SCREENING MAMMO BILAT W/TOMOSYNTHESIS W/CAD    2021  MA-DIAGNOSTIC MAMMO LEFT W/TOMOSYNTHESIS W/O CAD    2021  MA-SCREENING MAMMO BILAT W/TOMOSYNTHESIS W/CAD                    Patient Care Team:  CORBIN Boyd as PCP - General (Family Medicine)  Mirtha Field M.D. as PCP - [facility] Paneled      Social History     Tobacco Use    Smoking status: Former     Current packs/day: 0.00     Types: Cigarettes     Start date: 1964     Quit date: 1966     Years since quittin.9    Smokeless tobacco: Never   Vaping Use    Vaping status: Never Used   Substance Use Topics    Alcohol use: No    Drug use: No     Family History   Problem Relation Age of Onset    Alcohol abuse Mother         liver issues    Cancer Father         lung and colon    Stroke Sister     Hypertension Sister     Breast Cancer Maternal Aunt 78     She  has a past medical history of Allergy, Anesthesia, Arrhythmia, Arthritis, Blood clotting disorder (HCC), Elevated blood sugar, Heart burn, Hypertension, Indigestion, Pain (2014), PONV (postoperative nausea and vomiting), and Unspecified cataract.    She has no  "past medical history of Cold or Dental disorder.   Past Surgical History:   Procedure Laterality Date    PB TOTAL KNEE ARTHROPLASTY Left 06/27/2022    Procedure: ARTHROPLASTY, KNEE, TOTAL;  Surgeon: Nj Ortiz M.D.;  Location: SURGERY [facility];  Service: Orthopedics    DE NASAL SCOPY,RMV TISS MAXILL SINUS Left 12/14/2021    Procedure: ENDOSCOPY, PARANASAL SINUS, WITH TOTAL ETHMOIDECTOMY, SPHENOIDOTOMY, MAXILLARY ANTROSTOMY, SPHEN+MAX SIN TISS REM, AND EXPLORATION FRONT SIN - ANTERIOR ETHMOIDECTOMY;  Surgeon: Yobany Lugo M.D.;  Location: SURGERY SAME DAY [facility];  Service: Ent    CARPAL TUNNEL RELEASE  07/08/2014    Performed by Herson Hedrick M.D. at SURGERY [facility] ORS    CARPAL TUNNEL RELEASE  06/16/2014    Performed by Herson Hedrick M.D. at SURGERY [facility]    OTHER  2011    Bilateral IOL's    GYN SURGERY  1999    Hysterectomy    OTHER Left 1998    Vein ligation-  Leftleg    CARPAL TUNNEL RELEASE  1973    APPENDECTOMY  1972    ABDOMINAL HYSTERECTOMY TOTAL      ARTHROPLASTY      EYE SURGERY      OTHER Left Around 2010    Vein ablasion left leg    TUBAL COAGULATION LAPAROSCOPIC BILATERAL      TUBAL LIGATION         Exam:   /82 (BP Location: Right arm, Patient Position: Sitting, BP Cuff Size: Large adult)   Pulse 86   Temp 36.8 °C (98.3 °F) (Temporal)   Ht 1.753 m (5' 9\")   Wt 94.6 kg (208 lb 9.6 oz)   SpO2 98%  Body mass index is 30.8 kg/m².    Hearing good.    Dentition good  Alert, oriented in no acute distress.  Eye contact is good, speech goal directed, affect calm    Assessment and Plan. The following treatment and monitoring plan is recommended:    Assessment & Plan  1. Annual wellness visit.  Noted a slight elevation in her blood pressure today. Her last DEXA scan was conducted in 2021, revealing a minor decrease in bone density. However, her hip fracture risk and major osteoporotic fracture risk have remained stable. Administered the influenza vaccine today. " Recommended a repeat DEXA scan in 2026. Advised her to monitor her blood pressure over the next week or two and provide a log of the readings.     2.  Lumbar radiculopathy.  She reports numbness in her left leg when standing for long periods, which resolves upon sitting. Symptoms are now occasionally present in the right leg. She is not considering surgery based on previous advice from Dr. Hedrick. Advised to consider updated imaging if symptoms become more consistent or if there are concerns about vertebral stability. She will monitor for any significant changes, such as persistent numbness or symptoms occurring while sitting.    3. Gastroesophageal Reflux Disease (GERD).  She reports that Pepcid is not adequately controlling her symptoms, which include burning and mucus in the throat. A referral to Gastroenterology has been made for further evaluation. She is advised to continue monitoring her symptoms and consider dietary modifications to help manage her condition.    4. Hypertension.  Her blood pressure was elevated today, improved to 138/82 on repeat. She is currently on Lisinopril 10 mg. Advised to perform breathing exercises and monitor her blood pressure at home, providing a log of readings over the next week or two. If home readings remain high, an increase in Lisinopril to 20 mg may be considered.    5. Health Maintenance.  Administered the influenza vaccine today. Recommended a repeat DEXA scan in 2026. Advised that mammograms are optional after age 70 unless there are specific symptoms or family history concerns.    Follow-up  Return in 6 months for follow-up.        Services suggested: No services needed at this time  Health Care Screening: Age-appropriate preventive services recommended by USPTF and ACIP covered by Medicare were discussed today. Services ordered if indicated and agreed upon by the patient.  Referrals offered: Community-based lifestyle interventions to reduce health risks and promote  self-management and wellness, fall prevention, nutrition, physical activity, tobacco-use cessation, weight loss, and mental health services as per orders if indicated.    Discussion today about general wellness and lifestyle habits:    Prevent falls and reduce trip hazards; Cautioned about securing or removing rugs.  Have a working fire alarm and carbon monoxide detector;   Engage in regular physical activity and social activities     Follow-up: Return in about 6 months (around 5/21/2025).

## 2024-11-22 ENCOUNTER — TELEPHONE (OUTPATIENT)
Dept: MEDICAL GROUP | Facility: PHYSICIAN GROUP | Age: 79
End: 2024-11-22
Payer: MEDICARE

## 2024-11-22 NOTE — TELEPHONE ENCOUNTER
Patient would like Mark to put in her lab orders so she can get them done prior to her 6 month follow up appointment that she has scheduled in May. Please advise.

## 2024-11-25 DIAGNOSIS — I10 PRIMARY HYPERTENSION: ICD-10-CM

## 2024-11-25 DIAGNOSIS — R73.03 PREDIABETES: ICD-10-CM

## 2024-11-28 DIAGNOSIS — I10 PRIMARY HYPERTENSION: ICD-10-CM

## 2024-12-02 RX ORDER — LISINOPRIL 10 MG/1
10 TABLET ORAL DAILY
Qty: 100 TABLET | Refills: 3 | Status: SHIPPED | OUTPATIENT
Start: 2024-12-02

## 2024-12-02 NOTE — TELEPHONE ENCOUNTER
Received request via: Pharmacy    Was the patient seen in the last year in this department? Yes    Does the patient have an active prescription (recently filled or refills available) for medication(s) requested? No    Pharmacy Name:    Does the patient have [facility] Plus and need 100-day supply? (This applies to ALL medications) Yes, quantity updated to 100 days

## 2024-12-25 DIAGNOSIS — M19.90 ARTHRITIS: ICD-10-CM

## 2024-12-25 DIAGNOSIS — K21.9 GASTROESOPHAGEAL REFLUX DISEASE WITHOUT ESOPHAGITIS: ICD-10-CM

## 2024-12-26 RX ORDER — FAMOTIDINE 40 MG/1
40 TABLET, FILM COATED ORAL 2 TIMES DAILY
Qty: 180 TABLET | Refills: 0 | Status: SHIPPED | OUTPATIENT
Start: 2024-12-26

## 2024-12-26 RX ORDER — MELOXICAM 15 MG/1
15 TABLET ORAL DAILY
Qty: 90 TABLET | Refills: 0 | Status: SHIPPED | OUTPATIENT
Start: 2024-12-26

## 2024-12-27 NOTE — TELEPHONE ENCOUNTER
Received request via: Pharmacy    Was the patient seen in the last year in this department? Yes    Does the patient have an active prescription (recently filled or refills available) for medication(s) requested? No    Pharmacy Name:     Does the patient have snf Plus and need 100-day supply? (This applies to ALL medications)

## 2025-01-30 ENCOUNTER — OFFICE VISIT (OUTPATIENT)
Dept: MEDICAL GROUP | Facility: PHYSICIAN GROUP | Age: 80
End: 2025-01-30
Payer: MEDICARE

## 2025-01-30 VITALS
HEART RATE: 80 BPM | WEIGHT: 208 LBS | OXYGEN SATURATION: 97 % | RESPIRATION RATE: 16 BRPM | HEIGHT: 69 IN | DIASTOLIC BLOOD PRESSURE: 80 MMHG | BODY MASS INDEX: 30.81 KG/M2 | SYSTOLIC BLOOD PRESSURE: 138 MMHG | TEMPERATURE: 97.8 F

## 2025-01-30 DIAGNOSIS — I10 PRIMARY HYPERTENSION: ICD-10-CM

## 2025-01-30 DIAGNOSIS — Z79.2 NEED FOR ANTIBIOTIC PROPHYLAXIS FOR DENTAL PROCEDURE: ICD-10-CM

## 2025-01-30 DIAGNOSIS — M54.2 CERVICALGIA: ICD-10-CM

## 2025-01-30 DIAGNOSIS — M19.90 ARTHRITIS: ICD-10-CM

## 2025-01-30 DIAGNOSIS — M18.11 ARTHRITIS OF CARPOMETACARPAL (CMC) JOINT OF RIGHT THUMB: ICD-10-CM

## 2025-01-30 DIAGNOSIS — E66.811 OBESITY (BMI 30.0-34.9): ICD-10-CM

## 2025-01-30 DIAGNOSIS — M62.838 MUSCLE SPASM: ICD-10-CM

## 2025-01-30 RX ORDER — CLINDAMYCIN HYDROCHLORIDE 300 MG/1
CAPSULE ORAL
Qty: 2 CAPSULE | Refills: 2 | Status: SHIPPED | OUTPATIENT
Start: 2025-01-30

## 2025-01-30 RX ORDER — CYCLOBENZAPRINE HCL 5 MG
5 TABLET ORAL 3 TIMES DAILY PRN
Qty: 30 TABLET | Refills: 0 | Status: SHIPPED | OUTPATIENT
Start: 2025-01-30

## 2025-01-30 ASSESSMENT — PATIENT HEALTH QUESTIONNAIRE - PHQ9: CLINICAL INTERPRETATION OF PHQ2 SCORE: 0

## 2025-01-30 ASSESSMENT — FIBROSIS 4 INDEX: FIB4 SCORE: 1.95

## 2025-01-31 NOTE — PROGRESS NOTES
Verbal consent was acquired by the patient to use [employer] ambient listening note generation during this visit yes    Subjective:     HPI:   History of Present Illness  The patient presents for evaluation of hand pain, neck pain, and medication management.    Hand Pain  She reports a sudden onset of her thumb deviating towards her palm, particularly when at rest. This condition is more pronounced on the right side, which is her dominant hand. She also experiences pain in her left hand, although it lacks the noticeable bump present on the right. Her  strength is significantly compromised. She has been advised to consult a rheumatologist. She has been informed that she has arthritis in all her joints. She recalls an incident 4 years ago where a knot developed on the top of her foot, progressively worsening until it impeded her ability to walk. An emergency room visit revealed severe arthritis, which was managed with an injection. She has been on meloxicam 15 mg for the past 2 weeks, initially started at 7.5 mg but increased due to insufficient relief. She has been advised to take half a tablet of meloxicam but prefers to avoid it until her next appointment. She has been using Tums for flare-ups but has noticed a decrease in their use since starting the new medication. She describes her pain as achy, limiting her mobility. She is considering the use of a muscle relaxant in conjunction with meloxicam. She attributes to frequent phone use. She sought help from a hand therapist 1 to 2 years ago, who provided her with exercises and a brace, which initially improved her condition. However, she discontinued these interventions following a move.  - Onset: Sudden onset of thumb deviation towards the palm.  - Location: Right hand (dominant) and left hand.  - Duration: Ongoing for an unspecified duration.  - Character: Achy pain, thumb deviation, compromised  strength.  - Alleviating/Aggravating Factors: Meloxicam,  "Tums, exercises, and brace from hand therapist.  - Severity: Significant compromise in  strength, limiting mobility.    Neck Pain  She reports experiencing muscle spasms in her neck over the past 6 to 8 months, which resolve upon relaxation. However, she experienced a persistent spasm last night, resulting in soreness and limited range of motion. She suspects the issue originates from her cervical spine. She reports no numbness or tingling in her fingers or arms. She manages her back pain with Tylenol, which provides some relief.  - Onset: Muscle spasms over the past 6 to 8 months.  - Location: Neck, possibly originating from the cervical spine.  - Duration: Persistent spasm last night.  - Character: Muscle spasms, soreness, limited range of motion.  - Alleviating/Aggravating Factors: Relaxation, Tylenol.  - Severity: Soreness and limited range of motion.    Medication Management  She has been on meloxicam 15 mg for the past 2 weeks, initially started at 7.5 mg but increased due to insufficient relief. She has been advised to take half a tablet of meloxicam but prefers to avoid it until her next appointment. She has been using Tums for flare-ups but has noticed a decrease in their use since starting the new medication. She describes her pain as achy, limiting her mobility. She is considering the use of a muscle relaxant in conjunction with meloxicam. She is currently taking Pepcid and allopurinol.  - Onset: Meloxicam use for the past 2 weeks.  - Character: Achy pain, considering muscle relaxant.  - Alleviating/Aggravating Factors: Meloxicam, Tums, Pepcid, allopurinol.  - Severity: Pain limiting mobility.    MEDICATIONS  - Meloxicam  - Tylenol  - Pepcid  - Allopurinol  - Clindamycin    Health Maintenance: Completed    Objective:     Exam:  /80   Pulse 80   Temp 36.6 °C (97.8 °F)   Resp 16   Ht 1.753 m (5' 9\")   Wt 94.3 kg (208 lb)   SpO2 97%   BMI 30.72 kg/m²  Body mass index is 30.72 " kg/m².    Physical Exam  Constitutional:       Appearance: Normal appearance.   HENT:      Head: Normocephalic and atraumatic.   Cardiovascular:      Rate and Rhythm: Normal rate and regular rhythm.      Pulses: Normal pulses.      Heart sounds: Normal heart sounds.   Pulmonary:      Effort: Pulmonary effort is normal.      Breath sounds: Normal breath sounds.   Skin:     General: Skin is warm and dry.      Capillary Refill: Capillary refill takes less than 2 seconds.   Neurological:      General: No focal deficit present.      Mental Status: She is alert and oriented to person, place, and time.           Results      Assessment & Plan:     1. Arthritis of carpometacarpal (CMC) joint of right thumb  DX-JOINT SURVEY-HANDS SINGLE VIEW    Referral to Physical Therapy      2. Primary hypertension        3. Obesity (BMI 30.0-34.9)  Patient identified as having weight management issue.  Appropriate orders and counseling given.      4. Arthritis        5. Muscle spasm        6. Cervicalgia  cyclobenzaprine (FLEXERIL) 5 mg tablet      7. Need for antibiotic prophylaxis for dental procedure  clindamycin (CLEOCIN) 300 MG Cap          Assessment & Plan  1. Hand pain: Osteoarthritis.  - Continue meloxicam 15 mg as needed, monitor for gastrointestinal side effects.  - Limit Tylenol intake to less than 3000 mg per day.  - Avoid naproxen, ibuprofen, and aspirin.  - Referral to Pam Shoening at [facility] for occupational therapy.  - Conduct a hand survey to assess for any erosive changes in the joints.  - Consider a muscle relaxant in conjunction with meloxicam.    2. Neck pain: Muscle spasms.  - Continue meloxicam 15 mg as needed, monitor for gastrointestinal side effects.  - Limit Tylenol intake to less than 3000 mg per day.  - Avoid naproxen, ibuprofen, and aspirin.  -Prescribed Flexeril 5 mg by mouth as needed for muscle spasms.    3. Medication management.  - Prescription for clindamycin 600 mg, to be taken 1 hour  before dental procedures, sent to [facility].    Follow-up  - Schedule x-ray for hand survey.  - Monitor for gastrointestinal side effects from meloxicam.  - Consider referral to a rheumatologist if erosive changes are observed in hand joints.      Return if symptoms worsen or fail to improve.    I have placed the below orders and discussed them with an approved delegating provider. The MA is performing the below orders under the direction of Dr. Edmond.      Please note that this dictation was created using voice recognition software. I have made every reasonable attempt to correct obvious errors, but I expect that there are errors of grammar and possibly content that I did not discover before finalizing the note.

## 2025-02-25 ENCOUNTER — APPOINTMENT (OUTPATIENT)
Dept: RADIOLOGY | Facility: MEDICAL CENTER | Age: 80
End: 2025-02-25
Attending: NURSE PRACTITIONER
Payer: MEDICARE

## 2025-03-08 ENCOUNTER — HOSPITAL ENCOUNTER (OUTPATIENT)
Dept: RADIOLOGY | Facility: MEDICAL CENTER | Age: 80
End: 2025-03-08
Attending: NURSE PRACTITIONER
Payer: MEDICARE

## 2025-03-08 DIAGNOSIS — M18.11 ARTHRITIS OF CARPOMETACARPAL (CMC) JOINT OF RIGHT THUMB: ICD-10-CM

## 2025-03-08 PROCEDURE — 77077 JOINT SURVEY SINGLE VIEW: CPT

## 2025-03-10 ENCOUNTER — APPOINTMENT (OUTPATIENT)
Dept: MEDICAL GROUP | Facility: PHYSICIAN GROUP | Age: 80
End: 2025-03-10
Payer: MEDICARE

## 2025-03-10 ENCOUNTER — RESULTS FOLLOW-UP (OUTPATIENT)
Dept: MEDICAL GROUP | Facility: PHYSICIAN GROUP | Age: 80
End: 2025-03-10

## 2025-03-10 VITALS
BODY MASS INDEX: 32.28 KG/M2 | WEIGHT: 213 LBS | HEIGHT: 68 IN | SYSTOLIC BLOOD PRESSURE: 140 MMHG | OXYGEN SATURATION: 99 % | DIASTOLIC BLOOD PRESSURE: 80 MMHG | HEART RATE: 82 BPM | TEMPERATURE: 98 F

## 2025-03-10 DIAGNOSIS — D22.9 MULTIPLE ATYPICAL SKIN MOLES: ICD-10-CM

## 2025-03-10 DIAGNOSIS — M62.838 MUSCLE SPASMS OF NECK: ICD-10-CM

## 2025-03-10 DIAGNOSIS — M54.2 CERVICALGIA: ICD-10-CM

## 2025-03-10 DIAGNOSIS — I10 PRIMARY HYPERTENSION: ICD-10-CM

## 2025-03-10 DIAGNOSIS — M18.11 ARTHRITIS OF CARPOMETACARPAL (CMC) JOINT OF RIGHT THUMB: ICD-10-CM

## 2025-03-10 PROCEDURE — 3079F DIAST BP 80-89 MM HG: CPT | Performed by: NURSE PRACTITIONER

## 2025-03-10 PROCEDURE — 3077F SYST BP >= 140 MM HG: CPT | Performed by: NURSE PRACTITIONER

## 2025-03-10 PROCEDURE — 99214 OFFICE O/P EST MOD 30 MIN: CPT | Performed by: NURSE PRACTITIONER

## 2025-03-10 ASSESSMENT — FIBROSIS 4 INDEX: FIB4 SCORE: 1.95

## 2025-03-10 NOTE — PROGRESS NOTES
Verbal consent was acquired by the patient to use [employer] ambient listening note generation during this visit yes    Subjective:     HPI:   History of Present Illness  The patient presents for evaluation of a skin lesion, neck pain, GERD, hypertension, and foot symptoms.    Skin Lesion  She reports the presence of a rough, irregularly bordered skin lesion that has been persistent for an extended period.  - Onset: Persistent for an extended period.  - Character: Rough, irregularly bordered.    Neck Pain  She experiences intermittent neck pain, which she attributes to muscle involvement. The pain is positional, with certain head positions triggering muscle spasms and subsequent popping sounds. She suspects this may be related to arthritis and is considering physical therapy as a potential treatment option.  - Onset: Intermittent.  - Location: Neck.  - Character: Positional pain with muscle spasms and popping sounds.  - Alleviating/Aggravating Factors: Certain head positions trigger spasms; considering physical therapy.  - Severity: Suspected arthritis.    Hypertension  Her blood pressure readings have been consistently elevated, typically in the range of 150s. She has not been monitoring her blood pressure at home but reports no associated symptoms such as headaches.  - Onset: Consistently elevated.  - Character: Blood pressure readings in the 150s.  - Severity: No associated symptoms such as headaches.    GERD  She continues to manage her GERD symptoms with aloe and has an upcoming appointment with [facility] on Wednesday. She has been advised to try Reflux Gourmet, a thick syrup taken after meals and before bedtime, which she finds effective. However, she has not been able to discontinue her other medications. She expresses concern about the potential for cancerous cells.  - Onset: Ongoing management.  - Character: GERD symptoms.  - Alleviating/Aggravating Factors: Aloe, Reflux Gourmet syrup.  - Timing: After meals and  "before bedtime.  - Severity: Concern about potential cancerous cells.    Foot Symptoms  She also reports occasional foot swelling, which she believes is related to her back issues. She experiences a burning sensation in her feet after prolonged standing, which she attributes to arthritis. She has noticed an increase in these symptoms recently. She also reports numbness in her feet when sitting or lying in certain positions, accompanied by itching and tingling sensations. She believes these symptoms are nerve-related and can be alleviated by adjusting her back position.  - Onset: Recently increased symptoms.  - Location: Feet.  - Character: Swelling, burning sensation, numbness, itching, and tingling.  - Alleviating/Aggravating Factors: Prolonged standing, certain sitting or lying positions; alleviated by adjusting back position.  - Severity: Believes symptoms are nerve-related.    MEDICATIONS  - Meloxicam  - Fluticasone nasal spray  - Calm magnesium  - Cinnamon  - Oral calcium  - Cranberry  - D3  - Zinc    Health Maintenance: Completed    Objective:     Exam:  BP (!) 140/80 (BP Location: Left arm, Patient Position: Sitting, BP Cuff Size: Adult)   Pulse 82   Temp 36.7 °C (98 °F) (Temporal)   Ht 1.727 m (5' 8\")   Wt 96.6 kg (213 lb)   SpO2 99%   BMI 32.39 kg/m²  Body mass index is 32.39 kg/m².    Physical Exam  Constitutional:       Appearance: Normal appearance.   Cardiovascular:      Rate and Rhythm: Normal rate.      Pulses: Normal pulses.   Pulmonary:      Effort: Pulmonary effort is normal.   Skin:     General: Skin is warm and dry.      Findings: Rash present. Rash is macular and scaling.             Comments: See photo in media.  2 irregular moles with multiple colors noted on left upper chest   Neurological:      General: No focal deficit present.      Mental Status: She is alert and oriented to person, place, and time.           Results  Imaging  X-ray of joints showed mild to severe " changes.    Assessment & Plan:     1. Multiple atypical skin moles  Referral to Dermatology      2. Arthritis of carpometacarpal (CMC) joint of right thumb        3. Cervicalgia        4. Muscle spasms of neck        5. Primary hypertension            Assessment & Plan  1. Dermatological lesion: The lesion exhibits irregular borders and a rough texture.  - Referral to dermatology for further evaluation and potential biopsy.    2. Cervicalgia: The pain appears to be positional, potentially indicative of muscle tension or spasms.  - Referral to physical therapy if decided.    3. Hypertension: Her blood pressure readings have been consistently elevated, typically in the range of 150s.  - Monitor blood pressure at home and report any significant changes.  -Continue lisinopril 10 mg by mouth daily    4. Gastroesophageal reflux disease (GERD): She continues to manage her GERD symptoms with aloe, as well as Pepcid  -Patient will schedule endoscopy    5. Paresthesia in the lower extremities: The symptoms may be attributed to nerve compression, likely originating from the spine.    6. Health maintenance: Annual physical examination rescheduled for 11/24/2025.  - Undergo laboratory tests prior to the appointment.  - Mammogram and DEXA scan ordered for next spring.    Follow-up  - Follow up on 11/24/2025.      Return in about 8 months (around 11/21/2025) for AWV.    I have placed the below orders and discussed them with an approved delegating provider. The MA is performing the below orders under the direction of Dr. Lucio.      Please note that this dictation was created using voice recognition software. I have made every reasonable attempt to correct obvious errors, but I expect that there are errors of grammar and possibly content that I did not discover before finalizing the note.

## 2025-03-19 ENCOUNTER — OFFICE VISIT (OUTPATIENT)
Dept: DERMATOLOGY | Facility: IMAGING CENTER | Age: 80
End: 2025-03-19
Payer: MEDICARE

## 2025-03-19 DIAGNOSIS — L81.4 LENTIGINES: ICD-10-CM

## 2025-03-19 DIAGNOSIS — L82.1 SK (SEBORRHEIC KERATOSIS): ICD-10-CM

## 2025-03-19 DIAGNOSIS — D18.01 CHERRY ANGIOMA: ICD-10-CM

## 2025-03-19 PROCEDURE — 99203 OFFICE O/P NEW LOW 30 MIN: CPT | Performed by: NURSE PRACTITIONER

## 2025-03-19 NOTE — PROGRESS NOTES
DERMATOLOGY NOTE  NEW VISIT       Chief complaint: Establish Care and Skin Lesion     Patient here today for a few spots on concern, She reports she has one main spot on her chest and a few around the spot that she would like looked at. Patient reports the main spot has grown and its boarders have changed.     HPI/location: chest   Time present: x a few months   Painful lesion: No  Itching lesion: No  Enlarging lesion: Yes    History of skin cancer: No  History of precancers/actinic keratoses: No  History of biopsies:No  History of blistering/severe sunburns:Yes, Details: as a young adult  Family history of skin cancer:Yes, Details: father may have but patient is unsure   Family history of atypical moles:No      Allergies   Allergen Reactions    Codeine Nausea    Penicillins Hives    Sulfa Drugs Rash and Itching    Sulfamethoxazole W-Trimethoprim Rash    Morphine Nausea     confusion    Trimethoprim Rash and Itching     Septra        MEDICATIONS:  Medications relevant to specialty reviewed.     REVIEW OF SYSTEMS:   Positive for skin (see HPI)  Negative for fevers and chills       EXAM:  There were no vitals taken for this visit.  Constitutional: Well-developed, well-nourished, and in no distress.     A focused skin exam was performed including the affected areas of the chest. Notable findings on exam today listed below and/or in assessment/plan.     -sun exposed skin of trunk/chest with scattered clinically benign light brown reticulated macules all of which were morphologically similar and none of which were suspicious for skin cancer today on exam  few scattered 1-3mm bright red macules and thin papules on the trunk/chest  Few tan light brown stuck-on waxy papules scattered on the trunk/chest    IMPRESSION / PLAN:    1. Lentigines  - Benign-appearing nature of lesions discussed during exam.   Stressed importance of sun protection and discussed ABCDE's of melanoma--handouts given  - Advised to continue to monitor  for any return to clinic for new or concerning changes.      2. SK (seborrheic keratosis)  - Benign-appearing nature of lesions discussed during exam.   - Advised to continue to monitor for any return to clinic for new or concerning changes.      3. Cherry angioma  - Benign-appearing nature of lesions discussed during exam.   - Advised to continue to monitor for any return to clinic for new or concerning changes.          Please note that this dictation was created using voice recognition software. I have made every reasonable attempt to correct obvious errors, but I expect that there are errors of grammar and possibly content that I did not discover before finalizing the note.      Return to clinic in: Return for PRN. and as needed for any new or changing skin lesions.

## 2025-03-20 ENCOUNTER — PATIENT MESSAGE (OUTPATIENT)
Dept: HEALTH INFORMATION MANAGEMENT | Facility: OTHER | Age: 80
End: 2025-03-20

## 2025-03-23 DIAGNOSIS — M19.90 ARTHRITIS: ICD-10-CM

## 2025-03-23 DIAGNOSIS — K21.9 GASTROESOPHAGEAL REFLUX DISEASE WITHOUT ESOPHAGITIS: ICD-10-CM

## 2025-03-24 RX ORDER — FAMOTIDINE 40 MG/1
40 TABLET, FILM COATED ORAL 2 TIMES DAILY
Qty: 180 TABLET | Refills: 0 | Status: SHIPPED | OUTPATIENT
Start: 2025-03-24

## 2025-03-24 RX ORDER — MELOXICAM 15 MG/1
15 TABLET ORAL DAILY
Qty: 90 TABLET | Refills: 0 | Status: SHIPPED | OUTPATIENT
Start: 2025-03-24

## 2025-03-24 NOTE — TELEPHONE ENCOUNTER
Received request via: Pharmacy    Was the patient seen in the last year in this department? Yes    Does the patient have an active prescription (recently filled or refills available) for medication(s) requested? No    [facility]    Does the patient have care home Plus and need 100-day supply? (This applies to ALL medications) Yes, quantity updated to 100 days

## 2025-04-02 ENCOUNTER — TELEPHONE (OUTPATIENT)
Dept: MEDICAL GROUP | Facility: PHYSICIAN GROUP | Age: 80
End: 2025-04-02
Payer: MEDICARE

## 2025-04-02 DIAGNOSIS — M18.11 ARTHRITIS OF CARPOMETACARPAL (CMC) JOINT OF RIGHT THUMB: ICD-10-CM

## 2025-04-02 NOTE — TELEPHONE ENCOUNTER
[facility] for [facility]

## 2025-04-15 ENCOUNTER — APPOINTMENT (OUTPATIENT)
Dept: MEDICAL GROUP | Facility: PHYSICIAN GROUP | Age: 80
End: 2025-04-15
Payer: MEDICARE

## 2025-04-22 ENCOUNTER — OFFICE VISIT (OUTPATIENT)
Dept: MEDICAL GROUP | Facility: PHYSICIAN GROUP | Age: 80
End: 2025-04-22
Payer: MEDICARE

## 2025-04-22 VITALS
TEMPERATURE: 97.6 F | HEART RATE: 80 BPM | WEIGHT: 212 LBS | HEIGHT: 69 IN | SYSTOLIC BLOOD PRESSURE: 146 MMHG | DIASTOLIC BLOOD PRESSURE: 88 MMHG | BODY MASS INDEX: 31.4 KG/M2 | OXYGEN SATURATION: 96 %

## 2025-04-22 DIAGNOSIS — H93.12 TINNITUS AURIUM, LEFT: ICD-10-CM

## 2025-04-22 DIAGNOSIS — I10 WHITE COAT SYNDROME WITH DIAGNOSIS OF HYPERTENSION: ICD-10-CM

## 2025-04-22 DIAGNOSIS — I10 PRIMARY HYPERTENSION: ICD-10-CM

## 2025-04-22 DIAGNOSIS — Z91.09 ENVIRONMENTAL ALLERGIES: ICD-10-CM

## 2025-04-22 DIAGNOSIS — K21.9 GASTROESOPHAGEAL REFLUX DISEASE WITHOUT ESOPHAGITIS: ICD-10-CM

## 2025-04-22 DIAGNOSIS — F41.9 ANXIETY: ICD-10-CM

## 2025-04-22 PROCEDURE — 3077F SYST BP >= 140 MM HG: CPT | Performed by: NURSE PRACTITIONER

## 2025-04-22 PROCEDURE — 3079F DIAST BP 80-89 MM HG: CPT | Performed by: NURSE PRACTITIONER

## 2025-04-22 PROCEDURE — 99214 OFFICE O/P EST MOD 30 MIN: CPT | Performed by: NURSE PRACTITIONER

## 2025-04-22 RX ORDER — AZELASTINE HYDROCHLORIDE, FLUTICASONE PROPIONATE 137; 50 UG/1; UG/1
1 SPRAY, METERED NASAL DAILY
Qty: 23 G | Refills: 0 | Status: SHIPPED | OUTPATIENT
Start: 2025-04-22

## 2025-04-22 ASSESSMENT — FIBROSIS 4 INDEX: FIB4 SCORE: 1.95

## 2025-04-22 NOTE — LETTER
April 22, 2025        Giselle Ramon  [address]  [place] [address]        To Whom it May Concern:    I am writing this letter on behalf of my patient, Giselle Ramon, who has been under my professional care since 2022. I am a Family Nurse Practitioner, licensed in the [facility], qualified to assess and treat mental and emotional disabilities.    Based on my professional evaluation, Giselle has a mental health condition that substantially limits one or more major life activities. As part of their treatment plan, I have determined that the presence of an emotional support animal is necessary for her mental and emotional well-being. The companionship and support provided by the animal alleviate symptoms of grief and anxiety.    This letter is written in accordance with the Fair Housing Act (FHA), which allows for reasonable accommodation for individuals with disabilities who require an emotional support animal.    Please do not hesitate to contact me should you require any additional information.    If you have any questions or concerns, please don't hesitate to call.        Sincerely,        MARCY [email or web address].[email or web address].    Electronically Signed

## 2025-04-22 NOTE — PROGRESS NOTES
Verbal consent was acquired by the patient to use [employer] ambient listening note generation during this visit yes    Subjective:     HPI:   History of Present Illness  The patient presents for evaluation of anxiety, tinnitus, and acid reflux.    Anxiety  She reports experiencing anxiety and stress due to social isolation. She has decided to acquire a small Goldendoodle puppy as a , which she believes will provide emotional support and alleviate her symptoms. She is seeking an updated letter from the physician to present to her apartment management, indicating the therapeutic benefit of the  dog.  - Onset: Due to social isolation.  - Alleviating Factors: Acquiring a small Goldendoodle puppy for emotional support.    Tinnitus  She has been experiencing tinnitus, predominantly in her left ear, which is also the ear with documented hearing impairment. This condition has been present intermittently for an extended period but has recently become more pronounced, particularly over the past month. She recalls an incident of an ear infection following a flight to [place] several years ago, which resulted in prolonged ear blockage. She has noticed that when she is watching TV and laying on her right side, she needs to turn up the volume significantly. There is a significant difference in hearing between her ears. If one of her grandchildren is on her left side, they move to her right side so they do not have to repeat themselves. She is unsure if the sound is actually louder or if she is just more aware of it. When she is not thinking about it, she usually does not notice it, and it does not keep her awake or prevent her from falling asleep. She has been experiencing this off and on for a long time, but it seems louder now. She thinks she noticed it was louder about a month ago, but she is not really sure. She also reports experiencing congestion and postnasal drip. She has been receiving allergy  injections and uses Flonase twice daily and NasalCrom once nightly. She also takes montelukast. She recalls a previous prescription for Dymista, which was effective but discontinued due to insurance coverage issues. She reports experiencing dizziness upon rapid head movement. She initiated allergy injections approximately 6 to 7 years ago due to recurrent ear infections and excessive antibiotic use.  - Onset: Intermittent for an extended period, more pronounced over the past month.  - Location: Predominantly in the left ear.  - Duration: Intermittent, more pronounced recently.  - Character: Tinnitus, significant difference in hearing between ears, congestion, postnasal drip, dizziness upon rapid head movement.  - Alleviating Factors: Allergy injections, Flonase, NasalCrom, montelukast.  - Aggravating Factors: Rapid head movement.  - Timing: Off and on for a long time, seems louder now.    Acid Reflux  She had scheduled an endoscopy for tomorrow but decided to cancel it. She realized that she has been eating foods that she should avoid and compensating by taking more medication. She spent about 3 weeks in [place] where she was not following her diet properly. Upon returning home, she started drinking kristy and turmeric tea in the morning because she enjoys it. She also stopped taking antacids in the morning. She has been doing her back exercises daily, which she had previously stopped, and walking every day. She acknowledges the need to lose at least 20 more pounds. She reviewed all the dietary recommendations provided to her, including foods to eat and avoid, and pH considerations. She believes that if she can consistently follow these guidelines, she may see improvement. If her symptoms do not improve within 6 months, she plans to reschedule the endoscopy. She tried omeprazole for 2 weeks but did not find it helpful.  - Onset: Not specified.  - Alleviating Factors: Following dietary recommendations, drinking  "kristy and turmeric tea, doing back exercises, walking every day.  - Aggravating Factors: Eating foods she should avoid, not following diet properly.  - Timing: Symptoms to be monitored for 6 months before rescheduling endoscopy.  - Severity: Omeprazole not helpful.    Health Maintenance: Completed    Objective:     Exam:  BP (!) 146/88 (BP Location: Left arm, Patient Position: Sitting, BP Cuff Size: Adult)   Pulse 80   Temp 36.4 °C (97.6 °F) (Temporal)   Ht 1.753 m (5' 9\")   Wt 96.2 kg (212 lb)   SpO2 96%   BMI 31.31 kg/m²  Body mass index is 31.31 kg/m².    Constitutional: Alert, no distress, well-groomed.  Skin: Warm, dry, good turgor, no rashes in visible areas.  Eye: Equal, round and reactive, conjunctiva clear, lids normal.  ENMT: Lips without lesions, good dentition, moist mucous membranes.  Neck: Trachea midline, no masses, no thyromegaly.  Respiratory: Unlabored respiratory effort, no cough.  MSK: Normal gait, moves all extremities.  Neuro: Grossly non-focal.   Psych: Alert and oriented x3, normal affect and mood.      A chaperone was offered to the patient during today's exam.: Patient declined.    Results      Assessment & Plan:     1. Anxiety        2. Environmental allergies  Azelastine-Fluticasone 137-50 MCG/ACT Suspension      3. Tinnitus aurium, left        4. Gastroesophageal reflux disease without esophagitis        5. Primary hypertension        6. White coat syndrome with diagnosis of hypertension            Assessment & Plan  1. Anxiety: Chronic.  - Reports experiencing anxiety and stress from being alone.  - Believes a  dog will help alleviate symptoms.  - A letter will be provided today to support the need for a  dog.  - Family has encouraged getting a dog for companionship.    2. Tinnitus.  - Reports ringing in the left ear, which has become more noticeable in the past month.  - Experiences congestion and eustachian tube dysfunction, contributing to the tinnitus.  - " Uses Flonase in the morning, NasalCrom at night, and montelukast.  - Prescription for Dymista sent to pharmacy; if not covered, azelastine will be prescribed separately or purchased over the counter as Astepro.    3. Gastroesophageal reflux disease (GERD).  - Canceled upcoming endoscopy and plans to manage symptoms through dietary changes and weight loss.  - Noticed improvement with kristy and turmeric tea, reducing the need for antacids.  -Continue Pepcid 40 mg by mouth up to twice daily if needed  - Will monitor symptoms over the summer and reschedule endoscopy if necessary.  - Referral for endoscopy will be considered if symptoms worsen or do not improve within 6 months.    Follow-up  - Monitor nasal spray approval situation and adjust treatment as necessary.      Return in about 6 months (around 10/22/2025), or if symptoms worsen or fail to improve.    I have placed the below orders and discussed them with an approved delegating provider. The MA is performing the below orders under the direction of Dr. Edmond.      Please note that this dictation was created using voice recognition software. I have made every reasonable attempt to correct obvious errors, but I expect that there are errors of grammar and possibly content that I did not discover before finalizing the note.

## 2025-04-22 NOTE — Clinical Note
[address]  [facility]  [address]  [address]  [place] [place] [address]     April 22, 2025    Patient: Giselle Ramon   YOB: 1945   Date of Visit: 4/22/2025       To Whom It May Concern:    Giselle Ramon was seen and treated in our department on 4/22/2025.     Sincerely,     Mark Reddy, [email or web address].

## 2025-06-12 ENCOUNTER — TELEPHONE (OUTPATIENT)
Dept: HEALTH INFORMATION MANAGEMENT | Facility: OTHER | Age: 80
End: 2025-06-12
Payer: MEDICARE

## 2025-06-12 DIAGNOSIS — I10 PRIMARY HYPERTENSION: ICD-10-CM

## 2025-06-12 DIAGNOSIS — K21.9 GASTROESOPHAGEAL REFLUX DISEASE WITHOUT ESOPHAGITIS: ICD-10-CM

## 2025-06-12 DIAGNOSIS — Z91.09 ENVIRONMENTAL ALLERGIES: ICD-10-CM

## 2025-06-12 DIAGNOSIS — M19.90 ARTHRITIS: ICD-10-CM

## 2025-06-12 DIAGNOSIS — M54.2 CERVICALGIA: ICD-10-CM

## 2025-06-12 DIAGNOSIS — Z79.2 NEED FOR ANTIBIOTIC PROPHYLAXIS FOR DENTAL PROCEDURE: ICD-10-CM

## 2025-06-12 RX ORDER — AZELASTINE HYDROCHLORIDE, FLUTICASONE PROPIONATE 137; 50 UG/1; UG/1
1 SPRAY, METERED NASAL DAILY
Qty: 23 G | Refills: 0 | Status: SHIPPED | OUTPATIENT
Start: 2025-06-12

## 2025-06-12 RX ORDER — CYCLOBENZAPRINE HCL 5 MG
5 TABLET ORAL 3 TIMES DAILY PRN
Qty: 30 TABLET | Refills: 0 | OUTPATIENT
Start: 2025-06-12

## 2025-06-12 RX ORDER — MONTELUKAST SODIUM 10 MG/1
TABLET ORAL
Qty: 30 TABLET | OUTPATIENT
Start: 2025-06-12

## 2025-06-12 RX ORDER — LISINOPRIL 10 MG/1
10 TABLET ORAL DAILY
Qty: 100 TABLET | Refills: 0 | Status: SHIPPED | OUTPATIENT
Start: 2025-06-12

## 2025-06-12 RX ORDER — FAMOTIDINE 40 MG/1
40 TABLET, FILM COATED ORAL 2 TIMES DAILY
Qty: 180 TABLET | Refills: 0 | Status: SHIPPED | OUTPATIENT
Start: 2025-06-12

## 2025-06-12 RX ORDER — MELOXICAM 15 MG/1
15 TABLET ORAL DAILY
Qty: 90 TABLET | Refills: 0 | Status: SHIPPED | OUTPATIENT
Start: 2025-06-12

## 2025-06-12 RX ORDER — CLINDAMYCIN HYDROCHLORIDE 300 MG/1
CAPSULE ORAL
Qty: 2 CAPSULE | Refills: 0 | Status: SHIPPED | OUTPATIENT
Start: 2025-06-12

## 2025-06-12 NOTE — TELEPHONE ENCOUNTER
Received request via: Pharmacy    Was the patient seen in the last year in this department? Yes    Does the patient have an active prescription (recently filled or refills available) for medication(s) requested? No    [facility]    Does the patient have long term Plus and need 100-day supply? (This applies to ALL medications) Yes, quantity updated to 100 days

## 2025-06-12 NOTE — TELEPHONE ENCOUNTER
Received request via: Pharmacy    Was the patient seen in the last year in this department? Yes    Does the patient have an active prescription (recently filled or refills available) for medication(s) requested? No    [facility]    Does the patient have prison Plus and need 100-day supply? (This applies to ALL medications) Yes, quantity updated to 100 days

## 2025-06-18 ENCOUNTER — PHARMACY VISIT (OUTPATIENT)
Dept: PHARMACY | Facility: MEDICAL CENTER | Age: 80
End: 2025-06-18
Payer: COMMERCIAL

## 2025-06-18 PROCEDURE — RXMED WILLOW AMBULATORY MEDICATION CHARGE: Performed by: NURSE PRACTITIONER

## 2025-07-10 PROCEDURE — RXMED WILLOW AMBULATORY MEDICATION CHARGE: Performed by: INTERNAL MEDICINE

## 2025-07-11 ENCOUNTER — PHARMACY VISIT (OUTPATIENT)
Dept: PHARMACY | Facility: MEDICAL CENTER | Age: 80
End: 2025-07-11
Payer: COMMERCIAL

## (undated) DEVICE — HUMID-VENT HEAT AND MOISTURE EXCHANGE- (50/BX)

## (undated) DEVICE — SODIUM CHL IRRIGATION 0.9% 1000ML (12EA/CA)

## (undated) DEVICE — SUCTION INSTRUMENT YANKAUER BULBOUS TIP W/O VENT (50EA/CA)

## (undated) DEVICE — BLADE CURVED SINUS  (5EA/BX)

## (undated) DEVICE — SENSOR SPO2 NEO LNCS ADHESIVE (20/BX) SEE USER NOTES

## (undated) DEVICE — NEPTUNE 4 PORT MANIFOLD - (20/PK)

## (undated) DEVICE — SLEEVE VASO CALF MED - (10PR/CA)

## (undated) DEVICE — STOCKINETTE IMPERVIOUS 12X48 - STERILELF (10/CA)"

## (undated) DEVICE — SET EXTENSION WITH 2 PORTS (48EA/CA) ***PART #2C8610 IS A SUBSTITUTE*****

## (undated) DEVICE — BANDAGE ELASTIC STERILE VELCRO 6 X 5 YDS (25EA/CA)

## (undated) DEVICE — PACK ENT OR - (2EA/CA)

## (undated) DEVICE — STAPLER SKIN DISP - (6/BX 10BX/CA) VISISTAT

## (undated) DEVICE — SODIUM CHL. IRRIGATION 0.9% 3000ML (4EA/CA 65CA/PF)

## (undated) DEVICE — BLADE SAGITTAL 34MM

## (undated) DEVICE — TOWEL STOP TIMEOUT SAFETY FLAG (40EA/CA)

## (undated) DEVICE — BAG SPONGE COUNT 10.25 X 32 - BLUE (250/CA)

## (undated) DEVICE — MIXER BONE CEMENT REVOLUTION - W/FEMORAL PRESSURIZER (6/CA)

## (undated) DEVICE — GLOVE SZ 7.5 BIOGEL PI MICRO - PF LF (50PR/BX)

## (undated) DEVICE — PROTECTOR ULNA NERVE - (36PR/CA)

## (undated) DEVICE — ELECTRODE 850 FOAM ADHESIVE - HYDROGEL RADIOTRNSPRNT (50/PK)

## (undated) DEVICE — GLOVE, LITE (PAIR)

## (undated) DEVICE — GLOVE BIOGEL PI INDICATOR SZ 7.5 SURGICAL PF LF -(50/BX 4BX/CA)

## (undated) DEVICE — WATER IRRIGATION STERILE 1000ML (12EA/CA)

## (undated) DEVICE — BLADE SAGITTAL SYSTEM 18MM

## (undated) DEVICE — CANISTER SUCTION 3000ML MECHANICAL FILTER AUTO SHUTOFF MEDI-VAC NONSTERILE LF DISP  (40EA/CA)

## (undated) DEVICE — KIT ANESTHESIA W/CIRCUIT & 3/LT BAG W/FILTER (20EA/CA)

## (undated) DEVICE — SOD. CHL. INJ. 0.9% 250 ML - (36/CA 50CA/PF)

## (undated) DEVICE — HEAD HOLDER JUNIOR/ADULT

## (undated) DEVICE — ELECTRODE DUAL RETURN W/ CORD - (50/PK)

## (undated) DEVICE — TRACKER ENT PATIENT

## (undated) DEVICE — GLOVE BIOGEL SZ 8 SURGICAL PF LTX - (50PR/BX 4BX/CA)

## (undated) DEVICE — TUBE CONNECTING SUCTION - CLEAR PLASTIC STERILE 72 IN (50EA/CA)

## (undated) DEVICE — TRACKER ENT INSTRUMENT

## (undated) DEVICE — Device

## (undated) DEVICE — TUBE E-T HI-LO CUFF 7.5MM (10EA/PK)

## (undated) DEVICE — CHLORAPREP 26 ML APPLICATOR - ORANGE TINT(25/CA)

## (undated) DEVICE — SENSOR OXIMETER ADULT SPO2 RD SET (20EA/BX)

## (undated) DEVICE — SUTURE GENERAL

## (undated) DEVICE — TUBING CLEARLINK DUO-VENT - C-FLO (48EA/CA)

## (undated) DEVICE — LENS/HOOD FOR SPACESUIT - (32/PK) PEEL AWAY FACE

## (undated) DEVICE — GOWN WARMING STANDARD FLEX - (30/CA)

## (undated) DEVICE — TIP INTPLS HFLO ML ORFC BTRY - (12/CS)  FOR SURGILAV

## (undated) DEVICE — APPLICATOR COTTONTIP 3 IN - STERILE (10EA/PK 100PK/CA)

## (undated) DEVICE — LACTATED RINGERS INJ 1000 ML - (14EA/CA 60CA/PF)

## (undated) DEVICE — PACK TOTAL KNEE  (1/CA)

## (undated) DEVICE — PATTIES SURG X-RAYCOTTONOID - 1/2 X 3 IN (200/CA)

## (undated) DEVICE — GLOVE BIOGEL SZ 7.5 SURGICAL PF LTX - (50PR/BX 4BX/CA)

## (undated) DEVICE — SUTURE 1 VICRYL PLUS CTX - 8 X 18 INCH (12/BX)

## (undated) DEVICE — HANDPIECE 10FT INTPLS SCT PLS IRRIGATION HAND CONTROL SET (6/PK)

## (undated) DEVICE — SUTURE 2-0 VICRYL PLUS CT-1 - 8 X 18 INCH(12/BX)

## (undated) DEVICE — SUTURE 3-0 MONOCRYL PLUS PS-1 - 27 INCH (36/BX)

## (undated) DEVICE — PAD UNIVERSAL MULTI USE (1/EA)

## (undated) DEVICE — CANISTER SUCTION RIGID RED 1500CC (40EA/CA)

## (undated) DEVICE — KIT  I.V. START (100EA/CA)

## (undated) DEVICE — SET LEADWIRE 5 LEAD BEDSIDE DISPOSABLE ECG (1SET OF 5/EA)

## (undated) DEVICE — CATHETER IV 20 GA X 1-1/4 ---SURG.& SDS ONLY--- (50EA/BX)

## (undated) DEVICE — MASK ANESTHESIA ADULT  - (100/CA)

## (undated) DEVICE — GOWN SURGEONS LARGE - (32/CA)

## (undated) DEVICE — MAT PATIENT POSITIONING PREVALON (10EA/CA)

## (undated) DEVICE — BLADE STRAIGHT SINUS (5EA/PK)